# Patient Record
Sex: FEMALE | Race: WHITE | NOT HISPANIC OR LATINO | Employment: OTHER | URBAN - NONMETROPOLITAN AREA
[De-identification: names, ages, dates, MRNs, and addresses within clinical notes are randomized per-mention and may not be internally consistent; named-entity substitution may affect disease eponyms.]

---

## 2020-04-30 ENCOUNTER — OFFICE VISIT CONVERTED (OUTPATIENT)
Dept: FAMILY MEDICINE CLINIC | Age: 75
End: 2020-04-30
Attending: FAMILY MEDICINE

## 2020-06-22 ENCOUNTER — HOSPITAL ENCOUNTER (OUTPATIENT)
Dept: OTHER | Facility: HOSPITAL | Age: 75
Discharge: HOME | End: 2020-06-22
Attending: FAMILY MEDICINE

## 2020-08-27 ENCOUNTER — OFFICE VISIT CONVERTED (OUTPATIENT)
Dept: FAMILY MEDICINE CLINIC | Age: 75
End: 2020-08-27
Attending: FAMILY MEDICINE

## 2020-10-23 ENCOUNTER — HOSPITAL ENCOUNTER (OUTPATIENT)
Dept: OTHER | Facility: HOSPITAL | Age: 75
Discharge: HOME | End: 2020-10-23
Attending: FAMILY MEDICINE

## 2020-10-23 LAB
ALBUMIN SERPL-MCNC: 4.4 G/DL (ref 3.5–5)
ALBUMIN/GLOB SERPL: 1.5 {RATIO} (ref 1.4–2.6)
ALP SERPL-CCNC: 72 U/L (ref 43–160)
ALT SERPL-CCNC: 13 U/L (ref 10–40)
ANION GAP SERPL CALC-SCNC: 17 MMOL/L (ref 8–19)
AST SERPL-CCNC: 20 U/L (ref 15–50)
BASOPHILS # BLD AUTO: 0.05 10*3/UL (ref 0–0.2)
BASOPHILS NFR BLD AUTO: 0.7 % (ref 0–3)
BILIRUB SERPL-MCNC: 0.48 MG/DL (ref 0.2–1.3)
BUN SERPL-MCNC: 20 MG/DL (ref 5–25)
BUN/CREAT SERPL: 18 {RATIO} (ref 6–20)
CALCIUM SERPL-MCNC: 10.4 MG/DL (ref 8.7–10.4)
CHLORIDE SERPL-SCNC: 101 MMOL/L (ref 99–111)
CHOLEST SERPL-MCNC: 122 MG/DL (ref 107–200)
CHOLEST/HDLC SERPL: 2.6 {RATIO} (ref 3–6)
CONV ABS IMM GRAN: 0.05 10*3/UL (ref 0–0.2)
CONV CO2: 27 MMOL/L (ref 22–32)
CONV IMMATURE GRAN: 0.7 % (ref 0–1.8)
CONV TOTAL PROTEIN: 7.3 G/DL (ref 6.3–8.2)
CREAT UR-MCNC: 1.09 MG/DL (ref 0.5–0.9)
DEPRECATED RDW RBC AUTO: 42.9 FL (ref 36.4–46.3)
EOSINOPHIL # BLD AUTO: 0.13 10*3/UL (ref 0–0.7)
EOSINOPHIL # BLD AUTO: 1.7 % (ref 0–7)
ERYTHROCYTE [DISTWIDTH] IN BLOOD BY AUTOMATED COUNT: 12.9 % (ref 11.7–14.4)
EST. AVERAGE GLUCOSE BLD GHB EST-MCNC: 134 MG/DL
GFR SERPLBLD BASED ON 1.73 SQ M-ARVRAT: 50 ML/MIN/{1.73_M2}
GLOBULIN UR ELPH-MCNC: 2.9 G/DL (ref 2–3.5)
GLUCOSE SERPL-MCNC: 118 MG/DL (ref 65–99)
HBA1C MFR BLD: 6.3 % (ref 3.5–5.7)
HCT VFR BLD AUTO: 44 % (ref 37–47)
HDLC SERPL-MCNC: 47 MG/DL (ref 40–60)
HGB BLD-MCNC: 13.5 G/DL (ref 12–16)
LDLC SERPL CALC-MCNC: 42 MG/DL (ref 70–100)
LYMPHOCYTES # BLD AUTO: 1.63 10*3/UL (ref 1–5)
LYMPHOCYTES NFR BLD AUTO: 21.3 % (ref 20–45)
MCH RBC QN AUTO: 28 PG (ref 27–31)
MCHC RBC AUTO-ENTMCNC: 30.7 G/DL (ref 33–37)
MCV RBC AUTO: 91.1 FL (ref 81–99)
MONOCYTES # BLD AUTO: 0.63 10*3/UL (ref 0.2–1.2)
MONOCYTES NFR BLD AUTO: 8.2 % (ref 3–10)
NEUTROPHILS # BLD AUTO: 5.18 10*3/UL (ref 2–8)
NEUTROPHILS NFR BLD AUTO: 67.4 % (ref 30–85)
NRBC CBCN: 0 % (ref 0–0.7)
OSMOLALITY SERPL CALC.SUM OF ELEC: 294 MOSM/KG (ref 273–304)
PLATELET # BLD AUTO: 286 10*3/UL (ref 130–400)
PMV BLD AUTO: 10.1 FL (ref 9.4–12.3)
POTASSIUM SERPL-SCNC: 4.6 MMOL/L (ref 3.5–5.3)
RBC # BLD AUTO: 4.83 10*6/UL (ref 4.2–5.4)
SODIUM SERPL-SCNC: 140 MMOL/L (ref 135–147)
TRIGL SERPL-MCNC: 164 MG/DL (ref 40–150)
TSH SERPL-ACNC: 3.4 M[IU]/L (ref 0.27–4.2)
VLDLC SERPL-MCNC: 33 MG/DL (ref 5–37)
WBC # BLD AUTO: 7.67 10*3/UL (ref 4.8–10.8)

## 2021-01-27 ENCOUNTER — HOSPITAL ENCOUNTER (OUTPATIENT)
Dept: OTHER | Facility: HOSPITAL | Age: 76
Discharge: HOME | End: 2021-01-27
Attending: FAMILY MEDICINE

## 2021-01-27 LAB
APPEARANCE UR: ABNORMAL
BACTERIA UR CULT: NORMAL
BACTERIA UR QL AUTO: ABNORMAL
BILIRUB UR QL: NEGATIVE
CASTS URNS QL MICRO: ABNORMAL /[LPF]
COLOR UR: YELLOW
CONV LEUKOCYTE ESTERASE: ABNORMAL
CONV UROBILINOGEN IN URINE BY AUTOMATED TEST STRIP: 0.2 {EHRLICHU}/DL (ref 0.1–1)
EPI CELLS #/AREA URNS HPF: ABNORMAL /[HPF]
GLUCOSE 24H UR-MCNC: NEGATIVE MG/DL
HGB UR QL STRIP: ABNORMAL
KETONES UR QL STRIP: NEGATIVE MG/DL
MUCOUS THREADS URNS QL MICRO: ABNORMAL
NITRITE UR-MCNC: POSITIVE MG/ML
PH UR STRIP.AUTO: 5.5 [PH] (ref 5–8)
PROT UR-MCNC: NEGATIVE MG/DL
RBC # BLD AUTO: ABNORMAL /[HPF]
SP GR UR STRIP: >=1.03 (ref 1–1.03)
SPECIMEN SOURCE: ABNORMAL
UNIDENT CRYS URNS QL MICRO: ABNORMAL /[HPF]
WBC #/AREA URNS HPF: ABNORMAL /[HPF]

## 2021-01-29 LAB
AMPICILLIN SUSC ISLT: >=32
AMPICILLIN+SULBAC SUSC ISLT: >=32
BACTERIA UR CULT: ABNORMAL
CEFAZOLIN SUSC ISLT: <=4
CEFEPIME SUSC ISLT: <=0.12
CEFTAZIDIME SUSC ISLT: <=1
CEFTRIAXONE SUSC ISLT: <=0.25
CIPROFLOXACIN SUSC ISLT: <=0.25
ERTAPENEM SUSC ISLT: <=0.12
GENTAMICIN SUSC ISLT: <=1
LEVOFLOXACIN SUSC ISLT: <=0.12
NITROFURANTOIN SUSC ISLT: <=16
PIP+TAZO SUSC ISLT: <=4
TMP SMX SUSC ISLT: >=320
TOBRAMYCIN SUSC ISLT: <=1

## 2021-02-02 ENCOUNTER — HOSPITAL ENCOUNTER (OUTPATIENT)
Dept: OTHER | Facility: HOSPITAL | Age: 76
Discharge: HOME | End: 2021-02-02
Attending: FAMILY MEDICINE

## 2021-02-02 LAB
APPEARANCE UR: CLEAR
BACTERIA UR QL AUTO: ABNORMAL
BILIRUB UR QL: NEGATIVE
CASTS URNS QL MICRO: ABNORMAL /[LPF]
COLOR UR: ABNORMAL
CONV LEUKOCYTE ESTERASE: ABNORMAL
CONV UROBILINOGEN IN URINE BY AUTOMATED TEST STRIP: 0.2 {EHRLICHU}/DL (ref 0.1–1)
EPI CELLS #/AREA URNS HPF: ABNORMAL /[HPF]
GLUCOSE 24H UR-MCNC: NEGATIVE MG/DL
HGB UR QL STRIP: ABNORMAL
KETONES UR QL STRIP: NEGATIVE MG/DL
MUCOUS THREADS URNS QL MICRO: ABNORMAL
NITRITE UR-MCNC: NEGATIVE MG/ML
PH UR STRIP.AUTO: 5.5 [PH] (ref 5–8)
PROT UR-MCNC: NEGATIVE MG/DL
RBC # BLD AUTO: ABNORMAL /[HPF]
SP GR UR STRIP: 1.02 (ref 1–1.03)
SPECIMEN SOURCE: ABNORMAL
UNIDENT CRYS URNS QL MICRO: ABNORMAL /[HPF]
WBC #/AREA URNS HPF: ABNORMAL /[HPF]

## 2021-02-05 LAB
BACTERIA UR CULT: ABNORMAL
CEFEPIME SUSC ISLT: 2
CEFTAZIDIME SUSC ISLT: 2
CIPROFLOXACIN SUSC ISLT: <=0.25
GENTAMICIN SUSC ISLT: <=1
LEVOFLOXACIN SUSC ISLT: 0.5
PIP+TAZO SUSC ISLT: <=4
TOBRAMYCIN SUSC ISLT: <=1

## 2021-02-25 ENCOUNTER — HOSPITAL ENCOUNTER (OUTPATIENT)
Dept: OTHER | Facility: HOSPITAL | Age: 76
Discharge: HOME | End: 2021-02-25
Attending: FAMILY MEDICINE

## 2021-02-25 LAB
APPEARANCE UR: CLEAR
BACTERIA UR QL AUTO: ABNORMAL
BILIRUB UR QL: ABNORMAL
CASTS URNS QL MICRO: ABNORMAL /[LPF]
COLOR UR: YELLOW
CONV LEUKOCYTE ESTERASE: ABNORMAL
CONV UROBILINOGEN IN URINE BY AUTOMATED TEST STRIP: 1 {EHRLICHU}/DL (ref 0.1–1)
EPI CELLS #/AREA URNS HPF: ABNORMAL /[HPF]
GLUCOSE 24H UR-MCNC: NEGATIVE MG/DL
HGB UR QL STRIP: ABNORMAL
KETONES UR QL STRIP: ABNORMAL MG/DL
MUCOUS THREADS URNS QL MICRO: ABNORMAL
NITRITE UR-MCNC: NEGATIVE MG/ML
PH UR STRIP.AUTO: 5.5 [PH] (ref 5–8)
PROT UR-MCNC: ABNORMAL MG/DL
RBC # BLD AUTO: ABNORMAL /[HPF]
SP GR UR STRIP: 1.02 (ref 1–1.03)
SPECIMEN SOURCE: ABNORMAL
UNIDENT CRYS URNS QL MICRO: ABNORMAL /[HPF]
WBC #/AREA URNS HPF: ABNORMAL /[HPF]

## 2021-04-15 ENCOUNTER — OFFICE VISIT CONVERTED (OUTPATIENT)
Dept: FAMILY MEDICINE CLINIC | Age: 76
End: 2021-04-15
Attending: FAMILY MEDICINE

## 2021-04-16 ENCOUNTER — HOSPITAL ENCOUNTER (OUTPATIENT)
Dept: OTHER | Facility: HOSPITAL | Age: 76
Discharge: HOME | End: 2021-04-16
Attending: FAMILY MEDICINE

## 2021-04-16 LAB
ALBUMIN SERPL-MCNC: 4.3 G/DL (ref 3.5–5)
ALBUMIN/GLOB SERPL: 1.5 {RATIO} (ref 1.4–2.6)
ALP SERPL-CCNC: 68 U/L (ref 43–160)
ALT SERPL-CCNC: 13 U/L (ref 10–40)
ANION GAP SERPL CALC-SCNC: 15 MMOL/L (ref 8–19)
AST SERPL-CCNC: 23 U/L (ref 15–50)
BILIRUB SERPL-MCNC: 0.37 MG/DL (ref 0.2–1.3)
BUN SERPL-MCNC: 11 MG/DL (ref 5–25)
BUN/CREAT SERPL: 12 {RATIO} (ref 6–20)
CALCIUM SERPL-MCNC: 9.4 MG/DL (ref 8.7–10.4)
CHLORIDE SERPL-SCNC: 103 MMOL/L (ref 99–111)
CHOLEST SERPL-MCNC: 94 MG/DL (ref 107–200)
CHOLEST/HDLC SERPL: 2.1 {RATIO} (ref 3–6)
CONV CO2: 27 MMOL/L (ref 22–32)
CONV TOTAL PROTEIN: 7.2 G/DL (ref 6.3–8.2)
CREAT UR-MCNC: 0.94 MG/DL (ref 0.5–0.9)
ERYTHROCYTE [DISTWIDTH] IN BLOOD BY AUTOMATED COUNT: 13.1 % (ref 11.5–14.5)
EST. AVERAGE GLUCOSE BLD GHB EST-MCNC: 126 MG/DL
GFR SERPLBLD BASED ON 1.73 SQ M-ARVRAT: 59 ML/MIN/{1.73_M2}
GLOBULIN UR ELPH-MCNC: 2.9 G/DL (ref 2–3.5)
GLUCOSE SERPL-MCNC: 108 MG/DL (ref 65–99)
HBA1C MFR BLD: 12.3 G/DL (ref 12–16)
HBA1C MFR BLD: 6 % (ref 3.5–5.7)
HCT VFR BLD AUTO: 39.2 % (ref 37–47)
HDLC SERPL-MCNC: 44 MG/DL (ref 40–60)
LDLC SERPL CALC-MCNC: 21 MG/DL (ref 70–100)
MCH RBC QN AUTO: 28.2 PG (ref 27–31)
MCHC RBC AUTO-ENTMCNC: 31.4 G/DL (ref 33–37)
MCV RBC AUTO: 89.9 FL (ref 81–99)
OSMOLALITY SERPL CALC.SUM OF ELEC: 290 MOSM/KG (ref 273–304)
PLATELET # BLD AUTO: 302 10*3/UL (ref 130–400)
PMV BLD AUTO: 10.2 FL (ref 7.4–10.4)
POTASSIUM SERPL-SCNC: 4.5 MMOL/L (ref 3.5–5.3)
RBC # BLD AUTO: 4.36 10*6/UL (ref 4.2–5.4)
SODIUM SERPL-SCNC: 140 MMOL/L (ref 135–147)
TRIGL SERPL-MCNC: 145 MG/DL (ref 40–150)
TSH SERPL-ACNC: 2.16 M[IU]/L (ref 0.27–4.2)
VLDLC SERPL-MCNC: 29 MG/DL (ref 5–37)
WBC # BLD AUTO: 6.12 10*3/UL (ref 4.8–10.8)

## 2021-05-14 ENCOUNTER — HOSPITAL ENCOUNTER (OUTPATIENT)
Dept: OTHER | Facility: HOSPITAL | Age: 76
Discharge: HOME | End: 2021-05-14
Attending: FAMILY MEDICINE

## 2021-05-14 ENCOUNTER — OFFICE VISIT CONVERTED (OUTPATIENT)
Dept: FAMILY MEDICINE CLINIC | Age: 76
End: 2021-05-14
Attending: FAMILY MEDICINE

## 2021-05-14 LAB
APPEARANCE UR: CLEAR
BACTERIA UR QL AUTO: ABNORMAL
BILIRUB UR QL: NEGATIVE
CASTS URNS QL MICRO: ABNORMAL /[LPF]
COLOR UR: YELLOW
CONV LEUKOCYTE ESTERASE: NEGATIVE
CONV UROBILINOGEN IN URINE BY AUTOMATED TEST STRIP: 0.2 {EHRLICHU}/DL (ref 0.1–1)
EPI CELLS #/AREA URNS HPF: ABNORMAL /[HPF]
FOLATE SERPL-MCNC: 15.5 NG/ML (ref 4.8–20)
GLUCOSE 24H UR-MCNC: NEGATIVE MG/DL
HGB UR QL STRIP: ABNORMAL
KETONES UR QL STRIP: NEGATIVE MG/DL
MUCOUS THREADS URNS QL MICRO: ABNORMAL
NITRITE UR-MCNC: NEGATIVE MG/ML
PH UR STRIP.AUTO: 5.5 [PH] (ref 5–8)
PROT UR-MCNC: NEGATIVE MG/DL
RBC # BLD AUTO: ABNORMAL /[HPF]
SP GR UR STRIP: 1.01 (ref 1–1.03)
SPECIMEN SOURCE: ABNORMAL
UNIDENT CRYS URNS QL MICRO: ABNORMAL /[HPF]
VIT B12 SERPL-MCNC: 495 PG/ML (ref 211–911)
WBC #/AREA URNS HPF: ABNORMAL /[HPF]

## 2021-05-15 LAB
25(OH)D3 SERPL-MCNC: 79.7 NG/ML (ref 30–100)
IRON SATN MFR SERPL: 11 % (ref 20–55)
IRON SERPL-MCNC: 59 UG/DL (ref 60–170)
TIBC SERPL-MCNC: 532 UG/DL (ref 245–450)
TRANSFERRIN SERPL-MCNC: 372 MG/DL (ref 250–380)

## 2021-05-18 NOTE — PROGRESS NOTES
Dacia Napier  1945     Office/Outpatient Visit    Visit Date: Fri, May 14, 2021 01:08 pm    Provider: Mata Love MD (Assistant: Teena Perdue MA)    Location: [facility]        Electronically signed by Mata Love MD on  05/16/2021 07:10:01 PM                             Subjective:        CC: Ms. Napier is a 75 year old female.  This is a follow-up visit.          HPI:           Patient to be evaluated for type 2 diabetes mellitus without complications.  Specifically, this is type 2, non-insulin requiring diabetes.  Compliance with treatment has been good.  Current meds include an oral hypoglycemic.  Most recent lab results include Hemoglobin A1c:  6.3 (%) (10/23/2020).            With regard to the hyperlipidemia, unspecified, current treatment includes a lipid lowering agent.  Compliance with treatment has been good.  Most recent lab tests include Total Cholesterol:  94 (mg/dL) (04/16/2021), HDL:  44 (mg/dL) (04/16/2021), Triglycerides:  145 (mg/dL) (04/16/2021), LDL:  21 (mg/dL) (04/16/2021).            Dx with gastro-esophageal reflux disease without esophagitis; the location of the discomfort is primarily epigastric.  She describes the pain as burning.  Symptoms are improved with a proton-pump inhibitor.  Past medical history is pertinent for hiatal hernia.      ROS:     CONSTITUTIONAL:  Negative for chills and fever.      E/N/T:  Negative for ear pain, nasal congestion and sore throat.      CARDIOVASCULAR:  Negative for chest pain and palpitations.      RESPIRATORY:  Negative for recent cough and dyspnea.      GASTROINTESTINAL:  Positive for abdominal pain ( suprapubic; INTERMITTENT, CRAMPY ).   Negative for nausea or vomiting.      GENITOURINARY:  Positive for dysuria.   Negative for hematuria.      MUSCULOSKELETAL:  Negative for arthralgias and myalgias.      INTEGUMENTARY/BREAST:  Negative for breast mass and breast tenderness.      NEUROLOGICAL:  Negative  for headaches and paresthesias.          Past Medical History / Family History / Social History:         Last Reviewed on 5/14/2021 01:45 PM by Mata Love    Past Medical History:             PAST MEDICAL HISTORY             CURRENT MEDICAL PROVIDERS:    Cardiologist    Gastroenterologist         PREVENTIVE HEALTH MAINTENANCE             COLORECTAL CANCER SCREENING: Up to date (colonoscopy q10y; sigmoidoscopy q5y; Cologuard q3y) was last done 2018; colonoscopy with the following abnormalities noted-- Polyp(s); REPORT NOT IN CHART     MAMMOGRAM: Done within last 2 years and results in are chart was last done 7/6/20 with normal results     PAP SMEAR: No longer indicated due to age and history     Prolia Injection : last injection 11/4/20         Surgical History:         Positive for    Cataract Removal: bilateral;,    Hernia Repair: umbilical;,    Hysterectomy: COMPLETE BUT OVARIES ARE INTACT;;; and    Tonsillectomy/Adenoidectomy;         Family History:         Positive for Coronary Artery Disease and Myocardial Infarction;     Positive for Breast Cancer;     Positive for Cerebrovascular Accident;         Social History:     Occupation:    Retired     Marital Status:      Children: 2 children         Tobacco/Alcohol/Supplements:     Last Reviewed on 5/14/2021 01:45 PM by Mata Love    Tobacco: She has never smoked.          Substance Abuse History:     Last Reviewed on 5/14/2021 01:45 PM by Mata Love    None         Mental Health History:     Last Reviewed on 8/27/2020 06:18 PM by Anthony Feng    NEGATIVE         Communicable Diseases (eg STDs):     Last Reviewed on 8/27/2020 06:18 PM by Anthony Feng        Current Problems:     Last Reviewed on 5/14/2021 01:45 PM by Mata Love    Type 2 diabetes mellitus without complications    Hyperlipidemia, unspecified    Cardiac arrhythmia, unspecified    Encounter for screening mammogram for malignant neoplasm of  breast    Age-related osteoporosis without current pathological fracture    Hiatus hernia    Renal agenesis, 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        Current Medications:     Last Reviewed on 5/14/2021 01:45 PM by Mata Love    calcium 500 mg   [bid]    aspirin 81 mg oral tablet, delayed release (enteric coated) [take 1 tablet (81 mg) by oral route once daily]    Fish Oil  [bid]    vitamin d3  [once a day]    coq10  [once daily]    vitamin b  [one daily]    multivitamin  [one daily]    omeprazole 10 mg oral capsule,delayed release (enteric coated) [take 1 daily]    ezetimibe 10 mg oral tablet [TAKE 1 TABLET(10 MG) BY MOUTH EVERY DAY]    metFORMIN 500 mg oral tablet [TAKE 1 TABLET BY MOUTH WITH MORNING MEAL AND 1 TABLET BY MOUTH WITH EVENING MEAL]    rosuvastatin 10 mg oral tablet [TAKE 1 TABLET(10 MG) BY MOUTH EVERY DAY]    Prolia 60 mg/mL subcutaneous Syringe [inject 1 milliliter (60 mg) by subcutaneous route every 6 months in the upper arm, upper thigh or abdomen]        Objective:        Vitals:         Current: 5/14/2021 1:13:33 PM    Ht:  5 ft, 2 in;  Wt: 131.6 lbs;  BMI: 24.1T: 97.3 F (temporal);  BP: 108/66 mm Hg (left arm, sitting);  P: 95 bpm (left arm (BP Cuff), sitting);  sCr: 0.94 mg/dL;  GFR: 47.82        Exams:     PHYSICAL EXAM:     GENERAL: vital signs recorded - well developed, well nourished;  no apparent distress;     NECK: thyroid is non-palpable;     RESPIRATORY: normal respiratory rate and pattern with no distress; normal breath sounds  with no rales, rhonchi, wheezes or rubs;     CARDIOVASCULAR: normal rate; rhythm is regular;  no systolic murmur; no edema;     GASTROINTESTINAL: nontender;     LYMPHATIC: no enlargement of cervical or facial nodes; no supraclavicular nodes;     MUSCULOSKELETAL: No CVA tenderness     NEUROLOGIC: GROSSLY INTACT     PSYCHIATRIC:  appropriate affect and demeanor; normal speech pattern; grossly normal memory;         Assessment:         E11.9   Type 2 diabetes mellitus without complications       E78.5   Hyperlipidemia, unspecified       K21.9   Gastro-esophageal reflux disease without esophagitis       I49.9   Cardiac arrhythmia, unspecified       R30.0   Dysuria       E55.9   Vitamin D deficiency, unspecified       E53.9   Vitamin B deficiency, unspecified           ORDERS:         Lab Orders:       31565  FOL - [facility] Folate; folic acid serum  (Send-Out)            19549  IRONP - [facility] Iron and TIBC  (Send-Out)            77156  VB12 - [facility] Vitamin B12  (Send-Out)            32394  BDUAM - [facility] Urinalysis, automated, with micro  (Send-Out)            37859  VITD - [facility] Vitamin D, 25 Hydroxy  (Send-Out)              Procedures Ordered:       REFER  Referral to Specialist or Other Facility  (Send-Out)                      Plan:         Type 2 diabetes mellitus without complications        MEDICATIONS: (no change to current medication regimen)     FOLLOW-UP: Schedule a follow-up visit in 3 months.      GOOD CONTROL ON CURRENT MEDS         Hyperlipidemia, unspecified        GOOD CONTROL AT THIS TIME         Gastro-esophageal reflux disease without esophagitis        MEDICATIONS: (no change to current medication regimen)     REFERRALS:  Referral initiated to a gastroenterologist.      GOOD CONTROL OF GERD SYMPTOMS BUT WILL REFER TO GASTRO FOR THE ABDOMINAL CRAMPING.          Cardiac arrhythmia, unspecified        REFERRALS:  Referral initiated to a cardiologist.            Orders:       REFER  Referral to Specialist or Other  Facility  (Send-Out)              Dysuria        WILL CHECK A U/A BUT MAY BE ATROPHIC VAGINITIS         Vitamin D deficiency, unspecified        LABS AS ABOVE         Vitamin B deficiency, unspecified    LABORATORY:  Labs ordered to be performed today include Anemia profile Folate Serum iron Vitamin B12, urinalysis with micro, and Vitamin D.            Orders:       86053  FOL - [facility] Folate; folic acid serum  (Send-Out)            73010  IRONP - [facility] Iron and TIBC  (Send-Out)            57346  VB12 - [facility] Vitamin B12  (Send-Out)            19977  BDUAM - [facility] Urinalysis, automated, with micro  (Send-Out)            83165  VITD - [facility] Vitamin D, 25 Hydroxy  (Send-Out)                  Patient Recommendations:        For  Type 2 diabetes mellitus without complications:    Schedule a follow-up visit in 3 months.          For  Dysuria:    I also recommend WILL CHECK A U/A BUT MAY BE ATROPHIC VAGINITIS.          For  Vitamin D deficiency, unspecified:    I also recommend LABS AS ABOVE.              Charge Capture:         Primary Diagnosis:     E11.9  Type 2 diabetes mellitus without complications           Orders:      43578  Office/outpatient visit; established patient, level 4  (In-House)              E78.5  Hyperlipidemia, unspecified     K21.9  Gastro-esophageal reflux disease without esophagitis     I49.9  Cardiac arrhythmia, unspecified     R30.0  Dysuria     E55.9  Vitamin D deficiency, unspecified     E53.9  Vitamin B deficiency, unspecified         ADDENDUMS:      ____________________________________    Addendum: 06/01/2021 03:13 PM - Two, Team         Visit Note Faxed to:        [facility]; Number [phone]

## 2021-05-18 NOTE — PROGRESS NOTES
Dacia Napier  1945     Office/Outpatient Visit    Visit Date: Thu, Aug 27, 2020 01:59 pm    Provider: Anthony Feng MD (Assistant: Veronica Julien MA)    Location: [facility]        Electronically signed by Anthony Feng MD on  08/27/2020 06:18:45 PM                             Subjective:        CC: Ms. Napier is a 74 year old female.  This is a follow-up visit.  [phone] [facility] Today's encounter is being done with a telehealth visit. She has consented verbally with two witnesses for todays treatment. Todays visit is being conducted by audio and video. Individuals present during the telemedicine consultation include patient and Dr. Feng         HPI:           Patient to be evaluated for hyperlipidemia, unspecified.  Current treatment includes Crestor.  Compliance with treatment has been good; she takes her medication as directed and follows up as directed.  She denies experiencing any hypercholesterolemia related symptoms.  She does not know results of any recent lab monitoring.        Dacia Muller has a history of intermittent episodes of tachycardia.  These episodes occur randomly and will be associated with heart rates as high as 130.  His episodes last for minutes to up to an hour and usually go away with vagal maneuvers or carotid massage.  Prior to moving here, she saw cardiologist twice a year and they have been discussing cardiac ablation but Dacia Muller was not sure about this.  She says she will eventually like to see a cardiologist here in [place] but would like to wait until the end of the pandemic.  She denies chest pain, shortness of breath or edema.      Finally, Dacia Muller is a history of prediabetes.  She takes metformin 500 mg twice daily and attempts to monitor her diet.  She denies fatigue, polyuria, polyphagia or weight loss.    ROS:     CONSTITUTIONAL:  Negative for chills, fatigue, fever, and weight change.      EYES:  Negative for blurred vision.       CARDIOVASCULAR:  Negative for chest pain, dizziness, palpitations and edema.      RESPIRATORY:  Negative for dyspnea and cough.      GASTROINTESTINAL:  Negative for abdominal pain, diarrhea, nausea and vomiting.      NEUROLOGICAL:  Negative for headaches, paresthesias and weakness.      ENDOCRINE:  Negative for hair loss, heat/cold intolerance, polydipsia, and polyphagia.      PSYCHIATRIC:  Negative for anxiety, depression, and sleep disturbances.          Past Medical History / Family History / Social History:         Last Reviewed on 8/27/2020 06:18 PM by Anthony Feng    Past Medical History:             PAST MEDICAL HISTORY         Positive for    Arrhythmia (unknown type) and    Hyperlipidemia;         PREVENTIVE HEALTH MAINTENANCE             COLORECTAL CANCER SCREENING: colonoscopy with normal results; The next colonoscopy is due  2018     MAMMOGRAM: Done within last 2 years and results in are chart was last done 7/6/20 with normal results The next one is due  7/6/21-22     PAP SMEAR: No longer indicated due to age and history         Surgical History:         Positive for    Cataract Removal,    Hernia Repair,    Hysterectomy and    Tonsillectomy/Adenoidectomy;         Family History:         Positive for Coronary Artery Disease and Myocardial Infarction;     Positive for Breast Cancer;     Positive for Cerebrovascular Accident;         Social History:     Occupation:    Retired     Marital Status:      Children: 2 children         Tobacco/Alcohol/Supplements:     Last Reviewed on 8/27/2020 06:18 PM by Anthony Feng    Tobacco: She has never smoked.          Substance Abuse History:     Last Reviewed on 8/27/2020 06:18 PM by Anthony Feng    None         Mental Health History:     Last Reviewed on 8/27/2020 06:18 PM by Anthony Fegn    NEGATIVE         Communicable Diseases (eg STDs):     Last Reviewed on 8/27/2020 06:18 PM by Anthony Feng        Current Problems:     Last Reviewed on 8/27/2020 06:18  PM by Anthony Feng    Hyperlipidemia, unspecified    Cardiac arrhythmia, unspecified    Prediabetes    Encounter for screening mammogram for malignant neoplasm of breast        Immunizations:     None        Allergies:     Last Reviewed on 8/27/2020 06:18 PM by Anthony Feng    No Known Allergies.        Current Medications:     Last Reviewed on 8/27/2020 06:18 PM by Anthony Feng    calcium 500 mg   [bid]    aspirin 81 mg oral tablet, delayed release (enteric coated) [take 1 tablet (81 mg) by oral route once daily]    Fish Oil  [bid]    vitamin d3  [once a day]    coq10  [once daily]    vitamin b  [one daily]    multivitamin  [one daily]    Crestor 5 mg oral tablet [take 1 tablet (5 mg) by oral route once daily]    metFORMIN 500 mg oral tablet [take 1 tablet (500 mg) by oral route 2 times per day with morning andevening meals]    omeprazole 10 mg oral capsule,delayed release (enteric coated) [take 1 daily]        Objective:        Exams:     PHYSICAL EXAM:     GENERAL: vital signs recorded - well developed, well nourished;  no apparent distress;     EYES: conjunctiva and cornea are normal;     RESPIRATORY: normal respiratory rate and pattern with no distress;     BREAST/INTEGUMENT: No significant rashes, lesions or suspicious moles within limits of examination/encounter;     NEUROLOGIC: Grossly intact; mental status: alert and oriented x 3;     PSYCHIATRIC: appropriate affect and demeanor; normal speech pattern; Normal behavior;         Assessment:         E78.5   Hyperlipidemia, unspecified       I49.9   Cardiac arrhythmia, unspecified       R73.03   Prediabetes           ORDERS:         Lab Orders:       [id number]  [facility] Lipid Panel  (Send-Out)            83479  BDC [facility] CBC with 3 part diff  (Send-Out)            06705  TSH - [facility] TSH  (Send-Out)            17155  COMP [facility] Comp. Metabolic Panel  (Send-Out)            52841  A1CEG - [facility] Hemoglobin A1C  (Send-Out)                      Plan:          Hyperlipidemia, unspecified- Unknown control.  Continue Crestor 5 mg daily.  Lipid panel ordered.    LABORATORY:  Labs ordered to be performed today include lipid panel.            Orders:       24475  [facility]  [facility])              Cardiac arrhythmia, unspecified- Stable.  Continue to monitor.  Will consider cardiology referral in the near future.    LABORATORY:  Labs ordered to be performed today include CBC and TSH.            Orders:       [id number]  BDCBC [facility] CBC with 3 part diff  (Send-Out)            71215  TSH - [facility] TSH  (Send-Out)              Prediabetes- Continue metformin 500 mg twice daily.  Labs ordered including CMP and A1c.    LABORATORY:  Labs ordered to be performed today include Comprehensive metabolic panel and HgbA1C.            Orders:       21213  COMP - [facility] Comp. Metabolic Panel  (Send-Out)            01293  A1CEG - [facility] Hemoglobin A1C  (Send-Out)                  Charge Capture:         Primary Diagnosis:     E78.5  Hyperlipidemia, unspecified           Orders:      03398  Office/outpatient visit; established patient, level 4  (In-House)              I49.9  Cardiac arrhythmia, unspecified     R73.03  Prediabetes

## 2021-05-18 NOTE — PROGRESS NOTES
"Dacia Napier  1945     Office/Outpatient Visit    Visit Date: Thu, Apr 15, 2021 01:02 pm    Provider: Mata Love MD (Assistant: Nohemi Mcdonald MA)    Location: [facility]        Electronically signed by Mata Love MD on  04/28/2021 09:19:48 PM                             Subjective:        CC: POLYP REF GASTRO    CRMPY LOW ABD PAIN 2-3/WK, AT HS, ->GASTRO AS WELL    ?RHYTHYM PROBLEM STICKY VALVE, GET RECORDS AND SHE WILL ARRANGE CARD     O P BP PROLIA SCHED DEX MAMM    ADD NEPHROLOGY    GASTRO    DEXA/MAMM (WAS IN JULY)    LABS    RECORDSMs. Quyen is a 75 year old female.  PT is present in the office today for a basic check up, she is complaining of stomach issues lasting 2-3 hours off and on for the past 6 months. PT had colon last check 3 years ago;         HPI:           Patient presents with prediabetes.  Specifically, this is type 2, non-insulin requiring diabetes.  Compliance with treatment has been good.  Current meds include an oral hypoglycemic.  Most recent lab results include Hemoglobin A1c:  6.3 (%) (10/23/2020).            Complaint of cardiac arrhythmia, unspecified..  The symptom began years ago.  WAS TOLD SHE HAS A \"SICKY VALVE\"  NEEDS TO SEE A CARDIOLOGIST AGAIN           Hyperlipidemia, unspecified details; current treatment includes a lipid lowering agent.  Compliance with treatment has been good.  Most recent lab tests include Total Cholesterol:  122 (mg/dL) (10/23/2020), HDL:  47 (mg/dL) (10/23/2020), Triglycerides:  164 (mg/dL) (10/23/2020), LDL:  42 (mg/dL) (10/23/2020).            Gastro-esophageal reflux disease without esophagitis details; the location of the discomfort is primarily epigastric.  She describes the pain as burning.  Symptoms are improved with a proton-pump inhibitor.  Past medical history is pertinent for hiatal hernia.      ROS:     CONSTITUTIONAL:  Negative for chills and fever.      E/N/T:  Negative for ear pain, nasal " congestion and sore throat.      CARDIOVASCULAR:  Negative for chest pain and palpitations.      RESPIRATORY:  Negative for recent cough and dyspnea.      GASTROINTESTINAL:  Positive for abdominal pain ( suprapubic; INTERMITTENT, CRAMPY ).   Negative for nausea or vomiting.      GENITOURINARY:  Negative for dysuria and hematuria.      MUSCULOSKELETAL:  Negative for arthralgias and myalgias.      INTEGUMENTARY/BREAST:  Negative for breast mass and breast tenderness.      NEUROLOGICAL:  Negative for headaches and paresthesias.          Past Medical History / Family History / Social History:         Last Reviewed on 4/15/2021 01:18 PM by Mata Love    Past Medical History:             PAST MEDICAL HISTORY             CURRENT MEDICAL PROVIDERS:    Cardiologist    Gastroenterologist         PREVENTIVE HEALTH MAINTENANCE             COLORECTAL CANCER SCREENING: Up to date (colonoscopy q10y; sigmoidoscopy q5y; Cologuard q3y) was last done 2018; colonoscopy with the following abnormalities noted-- Polyp(s); REPORT NOT IN CHART     MAMMOGRAM: Done within last 2 years and results in are chart was last done 7/6/20 with normal results     PAP SMEAR: No longer indicated due to age and history     Prolia Injection : last injection 11/4/20         Surgical History:         Positive for    Cataract Removal: bilateral;,    Hernia Repair: umbilical;,    Hysterectomy: COMPLETE BUT OVARIES ARE INTACT;;; and    Tonsillectomy/Adenoidectomy;         Family History:         Positive for Coronary Artery Disease and Myocardial Infarction;     Positive for Breast Cancer;     Positive for Cerebrovascular Accident;         Social History:     Occupation:    Retired     Marital Status:      Children: 2 children         Tobacco/Alcohol/Supplements:     Last Reviewed on 4/15/2021 01:18 PM by Mata Love    Tobacco: She has never smoked.          Substance Abuse History:     Last Reviewed on 4/15/2021 01:18 PM by  Mata Love    None         Mental Health History:     Last Reviewed on 8/27/2020 06:18 PM by Anthony Feng    NEGATIVE         Communicable Diseases (eg STDs):     Last Reviewed on 8/27/2020 06:18 PM by Anthony Feng        Current Problems:     Last Reviewed on 4/15/2021 01:17 PM by Mata Love    Hyperlipidemia, unspecified    Cardiac arrhythmia, unspecified    Prediabetes    Encounter for screening mammogram for malignant neoplasm of breast    Age-related osteoporosis without current pathological fracture    Hiatus hernia    Renal agenesis, unilateral    Personal history of colonic polyps    History of recurrent urinary tract infections    Gastro-esophageal reflux disease without esophagitis        Immunizations:     Influenza, high dose seasonal 4/15/2021    SARS-COV-2 (COVID-19) vaccine, UNSPECIFIED 3/24/2021        Allergies:     Last Reviewed on 4/15/2021 01:18 PM by Mata Love    No Known Allergies.        Current Medications:     Last Reviewed on 4/15/2021 01:18 PM by Mata Love    calcium 500 mg   [bid]    aspirin 81 mg oral tablet, delayed release (enteric coated) [take 1 tablet (81 mg) by oral route once daily]    Fish Oil  [bid]    vitamin d3  [once a day]    coq10  [once daily]    vitamin b  [one daily]    multivitamin  [one daily]    omeprazole 10 mg oral capsule,delayed release (enteric coated) [take 1 daily]    ezetimibe 10 mg oral tablet [TAKE 1 TABLET(10 MG) BY MOUTH EVERY DAY]    metFORMIN 500 mg oral tablet [TAKE 1 TABLET BY MOUTH WITH MORNING MEAL AND 1 TABLET BY MOUTH WITH EVENING MEAL]    rosuvastatin 10 mg oral tablet [TAKE 1 TABLET(10 MG) BY MOUTH EVERY DAY]    Prolia 60 mg/mL subcutaneous Syringe [inject 1 milliliter (60 mg) by subcutaneous route every 6 months in the upper arm, upper thigh or abdomen]        Objective:        Vitals:         Current: 4/15/2021 1:13:59 PM    Wt: 130.4 lbsT: 96.8 F (temporal);  BP: 112/74 mm Hg (left arm,  sitting);  P: 85 bpm (left arm (BP Cuff), sitting);  sCr: 1.09 mg/dL;  GFR: 44.59        Exams:     PHYSICAL EXAM:     GENERAL: vital signs recorded - well developed, well nourished;  no apparent distress;     NECK: thyroid is non-palpable;     RESPIRATORY: normal respiratory rate and pattern with no distress; normal breath sounds with no rales, rhonchi, wheezes or rubs;     CARDIOVASCULAR: normal rate; rhythm is regular;  no systolic murmur; no edema;     GASTROINTESTINAL: nontender;     LYMPHATIC: no enlargement of cervical or facial nodes; no supraclavicular nodes;     MUSCULOSKELETAL: No CVA tenderness     NEUROLOGIC: GROSSLY INTACT     PSYCHIATRIC:  appropriate affect and demeanor; normal speech pattern; grossly normal memory;         Assessment:         E11.9   Type 2 diabetes mellitus without complications       I49.9   Cardiac arrhythmia, unspecified       E78.5   Hyperlipidemia, unspecified       K21.9   Gastro-esophageal reflux disease without esophagitis           ORDERS:         Lab Orders:       [id number]  DIAB1 [facility] LIPID,CMP, A1C: 01976, 99987, 24597  (Send-Out)            91869  BDCB2 - [facility] CBC w/o diff  (Send-Out)            90331  TSH - [facility] TSH  (Send-Out)              Procedures Ordered:       REFER  Referral to Specialist or Other Facility  (Send-Out)            REFER  Referral to Specialist or Other Facility  (Send-Out)                      Plan:         Type 2 diabetes mellitus without complications    LABORATORY:  Labs ordered to be performed today include Diabetes Panel 1; CMP, Lipid, A1C.      MEDICATIONS: (no change to current medication regimen)     FOLLOW-UP: Schedule a follow-up visit in 1 month.      GOOD CONTROL ON CURRENT MEDS           Orders:       [id number]  DIAB1 - [facility] LIPID,CMP, A1C: 54228, 01675, 35860  (Send-Out)              Cardiac arrhythmia, unspecified    LABORATORY:  Labs ordered to be performed today include CBC W/O DIFF and TSH.      REFERRALS:  Referral initiated to a  cardiologist.  WILL AT LEAST NEED AN ECHO           Orders:       [id number]  BDCB2 - [facility] CBC w/o diff  (Send-Out)            82565  TSH - [facility] TSH  (Send-Out)            REFER  Referral to Specialist or Other Facility  (Send-Out)              Hyperlipidemia, unspecified        LABS AS ABOVE         Gastro-esophageal reflux disease without esophagitis        REFERRALS:  Referral initiated to a gastroenterologist ( ALSO FOR HISTORY OF POLYPS AND CRAMPY PAINS ).            Orders:       REFER  Referral to Specialist or Other Facility  (Send-Out)                  Patient Recommendations:        For  Type 2 diabetes mellitus without complications:    Schedule a follow-up visit in 1 month.              Charge Capture:         Primary Diagnosis:     E11.9  Type 2 diabetes mellitus without complications           Orders:      88511  Office/outpatient visit; established patient, level 4  (In-House)              I49.9  Cardiac arrhythmia, unspecified     E78.5  Hyperlipidemia, unspecified     K21.9  Gastro-esophageal reflux disease without esophagitis         ADDENDUMS:      ____________________________________    Addendum: 06/01/2021 03:13 PM - Two, Team         Visit Note Faxed to:        [facility]; Number [phone]

## 2021-05-18 NOTE — PROGRESS NOTES
Dacia Napier  1945     Office/Outpatient Visit    Visit Date: Thu, Apr 30, 2020 09:43 am    Provider: Anthony Feng MD (Assistant: Hong Arellano, )    Location: [facility]        Electronically signed by Anthony Feng MD on  04/30/2020 10:17:32 AM                             Subjective:        CC: Ms. Napier is a 74 year old female.  This is her first visit to the clinic.  needs mammogram; [employer] VIDEO [id number].    TELEMEDICINE VISIT:    - Patient consented to this telemedicine visit. Consent obtained by Hong Arellano and Anthony Feng MD.    - Persons present during the telemedicine consultation include:  Patient, Dr. Feng    - This visit is being conducted via "[employer]  with both audio and video.        HPI:           Patient to be evaluated for hyperlipidemia, unspecified.  Current treatment includes Crestor.  Compliance with treatment has been good; she takes her medication as directed and follows up as directed.  She denies experiencing any hypercholesterolemia related symptoms.  She does not know results of any recent lab monitoring.        Dacia is overdue for breast cancer screening and is willing to have a mammogram ordered today.      Dacia notes that she has episodes of tachycardia that occur randomly.  She says her heart rate will go as high as 130 last for up to an hour and usually goes away with vagal maneuvers or carotid massage.  She follows with a cardiologist twice a year.  She says that they have discussed doing a cardiac ablation but she is unsure about this.  She says her cardiologist also told her that without the ablations that her arrhythmia would likely not cause any long-term consequences.  She denies chest pain or shortness of breath.  No edema.      Dacia notes that on a previous blood draw her A1c was found to be elevated at a normal range.  She was diagnosed with prediabetes and takes metformin 500 mg twice daily.  She denies polyphagia, polyuria,  fatigue or weight loss.    ROS:     CONSTITUTIONAL:  Negative for chills, fatigue, fever, and weight change.      EYES:  Negative for blurred vision.      E/N/T:  Negative for ear pain and tinnitus.      CARDIOVASCULAR:  Negative for chest pain, dizziness, palpitations and edema.      RESPIRATORY:  Negative for dyspnea and cough.      GASTROINTESTINAL:  Negative for abdominal pain, diarrhea, heartburn, nausea and vomiting.      GENITOURINARY:  Negative for dysuria, hematuria and polyuria.      HEMATOLOGIC/LYMPHATIC:  Negative for easy bruising and excessive bleeding.      MUSCULOSKELETAL:  Negative for arthralgias and myalgias.      INTEGUMENTARY/BREAST:  Negative for rash, breast mass and skin changes of breast.      NEUROLOGICAL:  Negative for headaches, paresthesias and weakness.      ENDOCRINE:  Negative for hair loss, heat/cold intolerance, polydipsia, and polyphagia.      PSYCHIATRIC:  Negative for anxiety, depression, and sleep disturbances.          Past Medical History / Family History / Social History:         Last Reviewed on 4/30/2020 10:17 AM by Anthony Feng    Past Medical History:             PAST MEDICAL HISTORY         Positive for    Arrhythmia (unknown type) and    Hyperlipidemia;         PREVENTIVE HEALTH MAINTENANCE             COLORECTAL CANCER SCREENING: colonoscopy with normal results; The next colonoscopy is due  2018     MAMMOGRAM: was last done 2019 with normal results The next one is due  Now     PAP SMEAR: No longer indicated due to age and history         Surgical History:         Positive for    Cataract Removal,    Hernia Repair,    Hysterectomy and    Tonsillectomy/Adenoidectomy;         Family History:         Positive for Coronary Artery Disease and Myocardial Infarction;     Positive for Breast Cancer;     Positive for Cerebrovascular Accident;         Social History:     Occupation:    Retired     Marital Status:      Children: 2 children          Tobacco/Alcohol/Supplements:     Last Reviewed on 4/30/2020 10:17 AM by Anthony Feng    Tobacco: She has never smoked.          Substance Abuse History:     Last Reviewed on 4/30/2020 10:17 AM by Anthony Feng    None         Mental Health History:     Last Reviewed on 4/30/2020 10:17 AM by Anthony Feng    NEGATIVE         Communicable Diseases (eg STDs):     Last Reviewed on 4/30/2020 10:17 AM by Anthony Feng        Current Problems:     Last Reviewed on 4/30/2020 10:17 AM by Anthony Fegn    Hyperlipidemia, unspecified    Cardiac arrhythmia, unspecified    Prediabetes    Encounter for screening mammogram for malignant neoplasm of breast        Immunizations:     None        Allergies:     Last Reviewed on 4/30/2020 10:17 AM by Anthony Feng    No Known Allergies.        Current Medications:     Last Reviewed on 4/30/2020 10:17 AM by Anthony Feng    calcium 500 mg   [bid]    aspirin 81 mg oral tablet, delayed release (enteric coated) [take 1 tablet (81 mg) by oral route once daily]    Fish Oil  [bid]    vitamin d3  [once a day]    coq10  [once daily]    vitamin b  [one daily]    multivitamin  [one daily]    Crestor 5 mg oral tablet [take 1 tablet (5 mg) by oral route once daily]    metFORMIN 500 mg oral tablet [take 1 tablet (500 mg) by oral route 2 times per day with morning andevening meals]    omeprazole 10 mg oral capsule,delayed release (enteric coated) [take 1 daily]        Objective:        Exams:     PHYSICAL EXAM:     GENERAL: vital signs recorded - well developed, well nourished;  no apparent distress;     EYES: conjunctiva and cornea are normal;     RESPIRATORY: normal respiratory rate and pattern with no distress;     BREAST/INTEGUMENT: No significant rashes, lesions or suspicious moles within limits of examination/encounter;     NEUROLOGIC: Grossly intact; mental status: alert and oriented x 3;     PSYCHIATRIC: appropriate affect and demeanor; normal speech pattern; Normal behavior;          Assessment:         E78.5   Hyperlipidemia, unspecified       Z12.31   Encounter for screening mammogram for malignant neoplasm of breast       I49.9   Cardiac arrhythmia, unspecified       R73.03   Prediabetes           ORDERS:         Radiology/Test Orders:       [id number]  Screening digital breast tomosynthesis bi  (Send-Out)                      Plan:         Hyperlipidemia, unspecified- Continue Crestor 5 mg daily. Will order lipid panel at next in-person visit.        Encounter for screening mammogram for malignant neoplasm of breast- Mammogram ordered.        RADIOLOGY:  I have ordered Mammogram Bilateral Screening 3D to be done today.            Orders:       [id number]  Screening digital breast tomosynthesis bi  (Send-Out)              Cardiac arrhythmia, unspecified- Continue to follow with cardiology as directed.        Prediabetes- Continue metformin 500 mg twice daily.  Will order labs at next in-person visit.            Charge Capture:         Primary Diagnosis:     E78.5  Hyperlipidemia, unspecified           Orders:      [id number]  Office visit - new pt, level 3  (In-House)              Z12.31  Encounter for screening mammogram for malignant neoplasm of breast     I49.9  Cardiac arrhythmia, unspecified     R73.03  Prediabetes

## 2021-05-22 ENCOUNTER — TRANSCRIBE ORDERS (OUTPATIENT)
Dept: ADMINISTRATIVE | Facility: HOSPITAL | Age: 76
End: 2021-05-22

## 2021-05-22 DIAGNOSIS — Z78.0 POST-MENOPAUSAL: ICD-10-CM

## 2021-05-22 DIAGNOSIS — Z12.31 VISIT FOR SCREENING MAMMOGRAM: Primary | ICD-10-CM

## 2021-05-27 ENCOUNTER — TRANSCRIBE ORDERS (OUTPATIENT)
Dept: ADMINISTRATIVE | Facility: HOSPITAL | Age: 76
End: 2021-05-27

## 2021-05-27 DIAGNOSIS — N26.1 ATROPHIC KIDNEY: Primary | ICD-10-CM

## 2021-06-10 ENCOUNTER — TELEPHONE (OUTPATIENT)
Dept: FAMILY MEDICINE CLINIC | Age: 76
End: 2021-06-10

## 2021-06-10 ENCOUNTER — HOSPITAL ENCOUNTER (OUTPATIENT)
Dept: ULTRASOUND IMAGING | Facility: HOSPITAL | Age: 76
Discharge: HOME | End: 2021-06-10
Admitting: INTERNAL MEDICINE

## 2021-06-10 DIAGNOSIS — N26.1 ATROPHIC KIDNEY: ICD-10-CM

## 2021-06-10 PROCEDURE — 76775 US EXAM ABDO BACK WALL LIM: CPT

## 2021-06-11 ENCOUNTER — TRANSCRIBE ORDERS (OUTPATIENT)
Dept: LAB | Facility: HOSPITAL | Age: 76
End: 2021-06-11

## 2021-06-11 ENCOUNTER — LAB (OUTPATIENT)
Dept: LAB | Facility: HOSPITAL | Age: 76
End: 2021-06-11

## 2021-06-11 DIAGNOSIS — I13.10 MALIGNANT HYPERTENSIVE HEART AND CHRONIC KIDNEY DISEASE STAGE III (HCC): ICD-10-CM

## 2021-06-11 DIAGNOSIS — N18.30 MALIGNANT HYPERTENSIVE HEART AND CHRONIC KIDNEY DISEASE STAGE III (HCC): ICD-10-CM

## 2021-06-11 DIAGNOSIS — I12.9 MALIGNANT HYPERTENSION WITH CHRONIC KIDNEY DISEASE STAGE III (HCC): ICD-10-CM

## 2021-06-11 DIAGNOSIS — N18.30 MALIGNANT HYPERTENSION WITH CHRONIC KIDNEY DISEASE STAGE III (HCC): ICD-10-CM

## 2021-06-11 DIAGNOSIS — I10 HYPERTENSION, ESSENTIAL: ICD-10-CM

## 2021-06-11 LAB
ALBUMIN SERPL-MCNC: 4.7 G/DL (ref 3.5–5.2)
ALBUMIN UR-MCNC: <1.2 MG/DL
ALBUMIN/GLOB SERPL: 1.8 G/DL
ALP SERPL-CCNC: 70 U/L (ref 39–117)
ALT SERPL W P-5'-P-CCNC: 13 U/L (ref 1–33)
ANION GAP SERPL CALCULATED.3IONS-SCNC: 11 MMOL/L (ref 5–15)
AST SERPL-CCNC: 19 U/L (ref 1–32)
BACTERIA UR QL AUTO: ABNORMAL /HPF
BASOPHILS # BLD AUTO: 0.01 10*3/MM3 (ref 0–0.2)
BASOPHILS NFR BLD AUTO: 0.2 % (ref 0–1.5)
BILIRUB SERPL-MCNC: 0.6 MG/DL (ref 0–1.2)
BILIRUB UR QL STRIP: NEGATIVE
BUN SERPL-MCNC: 14 MG/DL (ref 8–23)
BUN/CREAT SERPL: 15.2 (ref 7–25)
CALCIUM SPEC-SCNC: 9.5 MG/DL (ref 8.6–10.5)
CHLORIDE SERPL-SCNC: 100 MMOL/L (ref 98–107)
CLARITY UR: CLEAR
CO2 SERPL-SCNC: 29 MMOL/L (ref 22–29)
COLOR UR: YELLOW
CREAT SERPL-MCNC: 0.92 MG/DL (ref 0.57–1)
CREAT UR-MCNC: 107.6 MG/DL
CREAT UR-MCNC: 97.5 MG/DL
DEPRECATED RDW RBC AUTO: 43.6 FL (ref 37–54)
EOSINOPHIL # BLD AUTO: 0.11 10*3/MM3 (ref 0–0.4)
EOSINOPHIL NFR BLD AUTO: 1.8 % (ref 0.3–6.2)
ERYTHROCYTE [DISTWIDTH] IN BLOOD BY AUTOMATED COUNT: 13.5 % (ref 12.3–15.4)
GFR SERPL CREATININE-BSD FRML MDRD: 60 ML/MIN/1.73
GFR SERPL CREATININE-BSD FRML MDRD: 72 ML/MIN/1.73
GLOBULIN UR ELPH-MCNC: 2.6 GM/DL
GLUCOSE SERPL-MCNC: 114 MG/DL (ref 65–99)
GLUCOSE UR STRIP-MCNC: NEGATIVE MG/DL
HCT VFR BLD AUTO: 41.3 % (ref 34–46.6)
HGB BLD-MCNC: 13 G/DL (ref 12–15.9)
HGB UR QL STRIP.AUTO: ABNORMAL
IMM GRANULOCYTES # BLD AUTO: 0.02 10*3/MM3 (ref 0–0.05)
IMM GRANULOCYTES NFR BLD AUTO: 0.3 % (ref 0–0.5)
KETONES UR QL STRIP: NEGATIVE
LEUKOCYTE ESTERASE UR QL STRIP.AUTO: NEGATIVE
LYMPHOCYTES # BLD AUTO: 1.33 10*3/MM3 (ref 0.7–3.1)
LYMPHOCYTES NFR BLD AUTO: 22.1 % (ref 19.6–45.3)
MCH RBC QN AUTO: 28.1 PG (ref 26.6–33)
MCHC RBC AUTO-ENTMCNC: 31.5 G/DL (ref 31.5–35.7)
MCV RBC AUTO: 89.2 FL (ref 79–97)
MICROALBUMIN/CREAT UR: NORMAL MG/G{CREAT}
MONOCYTES # BLD AUTO: 0.48 10*3/MM3 (ref 0.1–0.9)
MONOCYTES NFR BLD AUTO: 8 % (ref 5–12)
NEUTROPHILS NFR BLD AUTO: 4.06 10*3/MM3 (ref 1.7–7)
NEUTROPHILS NFR BLD AUTO: 67.6 % (ref 42.7–76)
NITRITE UR QL STRIP: NEGATIVE
PH UR STRIP.AUTO: 6.5 [PH] (ref 5–8)
PLATELET # BLD AUTO: 259 10*3/MM3 (ref 140–450)
PMV BLD AUTO: 9.8 FL (ref 6–12)
POTASSIUM SERPL-SCNC: 4.3 MMOL/L (ref 3.5–5.2)
PROT SERPL-MCNC: 7.3 G/DL (ref 6–8.5)
PROT UR QL STRIP: NEGATIVE
PROT UR-MCNC: 8.9 MG/DL
PROT/CREAT UR: 0.1 MG/G{CREAT}
RBC # BLD AUTO: 4.63 10*6/MM3 (ref 3.77–5.28)
RBC # UR: ABNORMAL /HPF
REF LAB TEST METHOD: ABNORMAL
SODIUM SERPL-SCNC: 140 MMOL/L (ref 136–145)
SP GR UR STRIP: 1.02 (ref 1–1.03)
SQUAMOUS #/AREA URNS HPF: ABNORMAL /HPF
UROBILINOGEN UR QL STRIP: ABNORMAL
WBC # BLD AUTO: 6.01 10*3/MM3 (ref 3.4–10.8)
WBC UR QL AUTO: ABNORMAL /HPF

## 2021-06-11 PROCEDURE — 85025 COMPLETE CBC W/AUTO DIFF WBC: CPT

## 2021-06-11 PROCEDURE — 82570 ASSAY OF URINE CREATININE: CPT

## 2021-06-11 PROCEDURE — 82043 UR ALBUMIN QUANTITATIVE: CPT

## 2021-06-11 PROCEDURE — 80053 COMPREHEN METABOLIC PANEL: CPT

## 2021-06-11 PROCEDURE — 81001 URINALYSIS AUTO W/SCOPE: CPT

## 2021-06-11 PROCEDURE — 36415 COLL VENOUS BLD VENIPUNCTURE: CPT

## 2021-06-11 PROCEDURE — 84156 ASSAY OF PROTEIN URINE: CPT

## 2021-06-15 ENCOUNTER — PREP FOR SURGERY (OUTPATIENT)
Dept: OTHER | Facility: HOSPITAL | Age: 76
End: 2021-06-15

## 2021-06-15 ENCOUNTER — OFFICE VISIT (OUTPATIENT)
Dept: SURGERY | Facility: CLINIC | Age: 76
End: 2021-06-15

## 2021-06-15 VITALS — BODY MASS INDEX: 23 KG/M2 | RESPIRATION RATE: 16 BRPM | HEIGHT: 62 IN | WEIGHT: 125 LBS

## 2021-06-15 DIAGNOSIS — R10.13 EPIGASTRIC PAIN: ICD-10-CM

## 2021-06-15 DIAGNOSIS — Z12.11 COLON CANCER SCREENING: Primary | ICD-10-CM

## 2021-06-15 PROCEDURE — 99203 OFFICE O/P NEW LOW 30 MIN: CPT | Performed by: SURGERY

## 2021-06-15 RX ORDER — CHOLECALCIFEROL (VITAMIN D3) 125 MCG
2000 CAPSULE ORAL DAILY
COMMUNITY

## 2021-06-15 RX ORDER — ROSUVASTATIN CALCIUM 10 MG/1
10 TABLET, COATED ORAL NIGHTLY
COMMUNITY
End: 2021-10-27 | Stop reason: SDUPTHER

## 2021-06-15 RX ORDER — CETIRIZINE HYDROCHLORIDE 10 MG/1
10 TABLET ORAL DAILY PRN
COMMUNITY

## 2021-06-15 RX ORDER — ASPIRIN 81 MG/1
TABLET ORAL DAILY
COMMUNITY

## 2021-06-15 RX ORDER — FERROUS SULFATE 325(65) MG
1 TABLET ORAL 2 TIMES DAILY
COMMUNITY
Start: 2021-05-25 | End: 2021-08-26 | Stop reason: SDUPTHER

## 2021-06-15 RX ORDER — OMEPRAZOLE 20 MG/1
20 TABLET, DELAYED RELEASE ORAL DAILY
COMMUNITY
End: 2021-06-18

## 2021-06-15 RX ORDER — EZETIMIBE 10 MG/1
10 TABLET ORAL DAILY
COMMUNITY
End: 2022-01-06 | Stop reason: SDUPTHER

## 2021-06-15 NOTE — PROGRESS NOTES
Inpatient History and Physical Surgical Orders    Preadmission Location:   Preadmission Time:  Facility:  Surgery Date:  Surgery Time:  Preadmission Test date:     Chief Complaint  Outpatient History and Physical / Surgical Orders    Primary Care Provider: Mata Love MD    Referring Provider: Mata Love*    Subjective      Patient Name: Dacia Napier : 1945    HPI  The patient is a 75-year-old female who was referred for colon screening and an EGD.  She has had adenomatous polyps removed and most recently had a polyp 3 years ago on a colonoscopy.  She is also been having some upper abdominal discomfort and also has a history in the past of gastric ulcers.    Past History:  Medical History: has a past medical history of Colon polyp, Diabetes mellitus (CMS/HCC), and Hyperlipidemia.   Surgical History: has no past surgical history on file.   Family History: family history includes Heart disease in her brother, father, mother, and sister.   Social History: reports that she has never smoked. She has never used smokeless tobacco. She reports that she does not drink alcohol.  Allergies: Patient has no known allergies.       Current Outpatient Medications:   •  aspirin (aspirin) 81 MG EC tablet, Adult Low Dose Aspirin 81 mg tablet,delayed release  Take 1 tablet every day by oral route., Disp: , Rfl:   •  Calcium Carbonate (Calcium 600) 1500 (600 Ca) MG tablet, Calcium 600, Disp: , Rfl:   •  cetirizine (zyrTEC) 10 MG tablet, cetirizine 10 mg tablet  Take 1 tablet every day by oral route as needed., Disp: , Rfl:   •  Cholecalciferol (Vitamin D3) 50 MCG (2000 UT) tablet, Vitamin D3, Disp: , Rfl:   •  ezetimibe (ZETIA) 10 MG tablet, Take 10 mg by mouth., Disp: , Rfl:   •  FeroSul 325 (65 Fe) MG tablet, Take 1 tablet by mouth 2 (Two) Times a Day., Disp: , Rfl:   •  FLUTICASONE PROPIONATE, NASAL, NA, fluticasone propionate, Disp: , Rfl:   •  metFORMIN (GLUCOPHAGE) 500 MG tablet, Every 12  "(Twelve) Hours., Disp: , Rfl:   •  omeprazole OTC (PriLOSEC OTC) 20 MG EC tablet, omeprazole 20 mg tablet,delayed release  Take 1 tablet every day by oral route., Disp: , Rfl:   •  rosuvastatin (CRESTOR) 10 MG tablet, rosuvastatin 10 mg tablet  Take 1 tablet every day by oral route., Disp: , Rfl:        Objective   Vital Signs:   Resp 16   Ht 157.5 cm (62\")   Wt 56.7 kg (125 lb)   BMI 22.86 kg/m²       Physical Exam  Vitals and nursing note reviewed.   Constitutional:       Appearance: Normal appearance. He is well-developed.   Cardiovascular:      Rate and Rhythm: Normal rate and regular rhythm.   Pulmonary:      Effort: Pulmonary effort is normal.      Breath sounds: Normal air entry.   Abdominal:      General: Bowel sounds are normal.      Palpations: Abdomen is soft.      Skin:     General: Skin is warm and dry.   Neurological:      Mental Status: He is alert and oriented to person, place, and time.      Motor: Motor function is intact.   Psychiatric:         Mood and Affect: Mood normal.       Result Review :               Assessment and Plan   Diagnoses and all orders for this visit:    1. Colon cancer screening (Primary)    2. Epigastric pain    We will set her up for an EGD and a colonoscopy.  I have described those procedures to her as well as the risk and benefits and she is agreeable to proceeding.    I  Ty Partida MD  06/15/2021    "

## 2021-06-17 ENCOUNTER — TELEPHONE (OUTPATIENT)
Dept: FAMILY MEDICINE CLINIC | Age: 76
End: 2021-06-17

## 2021-06-17 NOTE — TELEPHONE ENCOUNTER
Premarin 0.625mg/gm cream is not covered by insurance. Estradiol cream is covered. If ok, please send to [facility] in [place].

## 2021-06-17 NOTE — TELEPHONE ENCOUNTER
Caller: Dacia Napier    Relationship to patient: Self    Best call back number: [phone]    Patient is needing: PATIENT WAS RETURNING A CALL TO OUR OFFICE.    PLEASE CALL AND ADVISE.

## 2021-06-18 RX ORDER — CHLORAL HYDRATE 500 MG
1000 CAPSULE ORAL DAILY
COMMUNITY

## 2021-06-18 RX ORDER — OMEPRAZOLE 10 MG/1
10 CAPSULE, DELAYED RELEASE ORAL
COMMUNITY
End: 2021-11-05

## 2021-06-21 NOTE — TELEPHONE ENCOUNTER
[facility] pharmacy said Estradiol vaginal cream 0.01% is what the insurance will cover but its not a equalent.  The Premarin 0.625 would need a PA.

## 2021-06-23 ENCOUNTER — HOSPITAL ENCOUNTER (OUTPATIENT)
Dept: MAMMOGRAPHY | Facility: HOSPITAL | Age: 76
Discharge: HOME | End: 2021-06-23

## 2021-06-23 ENCOUNTER — HOSPITAL ENCOUNTER (OUTPATIENT)
Dept: BONE DENSITY | Facility: HOSPITAL | Age: 76
Discharge: HOME | End: 2021-06-23

## 2021-06-23 DIAGNOSIS — Z78.0 POST-MENOPAUSAL: ICD-10-CM

## 2021-06-23 DIAGNOSIS — Z12.31 VISIT FOR SCREENING MAMMOGRAM: ICD-10-CM

## 2021-06-23 PROCEDURE — 77080 DXA BONE DENSITY AXIAL: CPT

## 2021-06-23 PROCEDURE — 77067 SCR MAMMO BI INCL CAD: CPT

## 2021-06-23 PROCEDURE — 77067 SCR MAMMO BI INCL CAD: CPT | Performed by: RADIOLOGY

## 2021-06-23 PROCEDURE — 77063 BREAST TOMOSYNTHESIS BI: CPT | Performed by: RADIOLOGY

## 2021-06-23 PROCEDURE — 77063 BREAST TOMOSYNTHESIS BI: CPT

## 2021-06-25 ENCOUNTER — ANESTHESIA (OUTPATIENT)
Dept: GASTROENTEROLOGY | Facility: HOSPITAL | Age: 76
End: 2021-06-25

## 2021-06-25 ENCOUNTER — ANESTHESIA EVENT (OUTPATIENT)
Dept: GASTROENTEROLOGY | Facility: HOSPITAL | Age: 76
End: 2021-06-25

## 2021-06-25 ENCOUNTER — TRANSCRIBE ORDERS (OUTPATIENT)
Dept: ADMINISTRATIVE | Facility: HOSPITAL | Age: 76
End: 2021-06-25

## 2021-06-25 ENCOUNTER — HOSPITAL ENCOUNTER (OUTPATIENT)
Facility: HOSPITAL | Age: 76
Setting detail: HOSPITAL OUTPATIENT SURGERY
Discharge: HOME | End: 2021-06-25
Attending: SURGERY | Admitting: SURGERY

## 2021-06-25 VITALS
HEART RATE: 59 BPM | BODY MASS INDEX: 22.66 KG/M2 | WEIGHT: 123.9 LBS | DIASTOLIC BLOOD PRESSURE: 61 MMHG | SYSTOLIC BLOOD PRESSURE: 124 MMHG | RESPIRATION RATE: 14 BRPM | OXYGEN SATURATION: 95 % | TEMPERATURE: 97.6 F

## 2021-06-25 DIAGNOSIS — N13.30 HYDRONEPHROSIS, UNSPECIFIED HYDRONEPHROSIS TYPE: Primary | ICD-10-CM

## 2021-06-25 DIAGNOSIS — Z12.11 COLON CANCER SCREENING: ICD-10-CM

## 2021-06-25 PROCEDURE — 88305 TISSUE EXAM BY PATHOLOGIST: CPT | Performed by: SURGERY

## 2021-06-25 PROCEDURE — 25010000002 PROPOFOL 10 MG/ML EMULSION: Performed by: NURSE ANESTHETIST, CERTIFIED REGISTERED

## 2021-06-25 RX ORDER — SODIUM CHLORIDE 0.9 % (FLUSH) 0.9 %
3 SYRINGE (ML) INJECTION EVERY 12 HOURS SCHEDULED
Status: DISCONTINUED | OUTPATIENT
Start: 2021-06-25 | End: 2021-06-25 | Stop reason: HOSPADM

## 2021-06-25 RX ORDER — LIDOCAINE HYDROCHLORIDE 20 MG/ML
INJECTION, SOLUTION INFILTRATION; PERINEURAL AS NEEDED
Status: DISCONTINUED | OUTPATIENT
Start: 2021-06-25 | End: 2021-06-25 | Stop reason: SURG

## 2021-06-25 RX ORDER — PROPOFOL 10 MG/ML
VIAL (ML) INTRAVENOUS AS NEEDED
Status: DISCONTINUED | OUTPATIENT
Start: 2021-06-25 | End: 2021-06-25 | Stop reason: SURG

## 2021-06-25 RX ORDER — SODIUM CHLORIDE 0.9 % (FLUSH) 0.9 %
10 SYRINGE (ML) INJECTION AS NEEDED
Status: DISCONTINUED | OUTPATIENT
Start: 2021-06-25 | End: 2021-06-25 | Stop reason: HOSPADM

## 2021-06-25 RX ORDER — SODIUM CHLORIDE, SODIUM LACTATE, POTASSIUM CHLORIDE, CALCIUM CHLORIDE 600; 310; 30; 20 MG/100ML; MG/100ML; MG/100ML; MG/100ML
30 INJECTION, SOLUTION INTRAVENOUS CONTINUOUS
Status: DISCONTINUED | OUTPATIENT
Start: 2021-06-25 | End: 2021-06-25 | Stop reason: HOSPADM

## 2021-06-25 RX ORDER — ONDANSETRON 4 MG/1
4 TABLET, FILM COATED ORAL ONCE AS NEEDED
Status: DISCONTINUED | OUTPATIENT
Start: 2021-06-25 | End: 2021-06-25 | Stop reason: HOSPADM

## 2021-06-25 RX ORDER — ONDANSETRON 2 MG/ML
4 INJECTION INTRAMUSCULAR; INTRAVENOUS ONCE AS NEEDED
Status: DISCONTINUED | OUTPATIENT
Start: 2021-06-25 | End: 2021-06-25 | Stop reason: HOSPADM

## 2021-06-25 RX ADMIN — PROPOFOL 40 MG: 10 INJECTION, EMULSION INTRAVENOUS at 13:18

## 2021-06-25 RX ADMIN — PROPOFOL 30 MG: 10 INJECTION, EMULSION INTRAVENOUS at 13:45

## 2021-06-25 RX ADMIN — PROPOFOL 30 MG: 10 INJECTION, EMULSION INTRAVENOUS at 13:27

## 2021-06-25 RX ADMIN — PROPOFOL 100 MCG/KG/MIN: 10 INJECTION, EMULSION INTRAVENOUS at 13:20

## 2021-06-25 RX ADMIN — LIDOCAINE HYDROCHLORIDE 60 MG: 20 INJECTION, SOLUTION INFILTRATION; PERINEURAL at 13:16

## 2021-06-25 RX ADMIN — LIDOCAINE HYDROCHLORIDE 40 MG: 20 INJECTION, SOLUTION INFILTRATION; PERINEURAL at 13:20

## 2021-06-25 RX ADMIN — PROPOFOL 40 MG: 10 INJECTION, EMULSION INTRAVENOUS at 13:34

## 2021-06-25 RX ADMIN — PROPOFOL 30 MG: 10 INJECTION, EMULSION INTRAVENOUS at 13:24

## 2021-06-25 RX ADMIN — PROPOFOL 60 MG: 10 INJECTION, EMULSION INTRAVENOUS at 13:13

## 2021-06-25 RX ADMIN — SODIUM CHLORIDE, POTASSIUM CHLORIDE, SODIUM LACTATE AND CALCIUM CHLORIDE 30 ML/HR: 600; 310; 30; 20 INJECTION, SOLUTION INTRAVENOUS at 13:00

## 2021-06-25 NOTE — ANESTHESIA POSTPROCEDURE EVALUATION
Patient: Dacia Napier    Procedure Summary     Date: 06/25/21 Room / Location: [facility] ENDOSCOPY 5 / [facility] ENDOSCOPY    Anesthesia Start: 1313 Anesthesia Stop:     Procedures:       ESOPHAGOGASTRODUODENOSCOPY (N/A )      COLONOSCOPY (N/A ) Diagnosis:       Colon cancer screening      (Colon cancer screening [Z12.11])    Surgeons: Ty Partida MD Provider: Rahul Heart MD    Anesthesia Type: general ASA Status: 2          Anesthesia Type: general    Vitals  Vitals Value Taken Time   BP 93/56 06/25/21 1352   Temp 36.2 °C (97.2 °F) 06/25/21 1352   Pulse 61 06/25/21 1356   Resp 13 06/25/21 1352   SpO2 99 % 06/25/21 1356   Vitals shown include unvalidated device data.        Post Anesthesia Care and Evaluation    Patient location during evaluation: bedside  Patient participation: complete - patient participated  Level of consciousness: awake  Pain score: 0  Pain management: adequate  Airway patency: patent  Anesthetic complications: No anesthetic complications  PONV Status: none  Cardiovascular status: acceptable and stable  Respiratory status: acceptable and room air  Hydration status: acceptable

## 2021-06-28 DIAGNOSIS — N95.2 POSTMENOPAUSAL ATROPHIC VAGINITIS: Primary | ICD-10-CM

## 2021-06-28 RX ORDER — ESTRADIOL 0.1 MG/G
2 CREAM VAGINAL 2 TIMES WEEKLY
Qty: 42.5 G | Refills: 1 | Status: SHIPPED | OUTPATIENT
Start: 2021-06-28 | End: 2021-07-12 | Stop reason: SDUPTHER

## 2021-06-29 LAB
CYTO UR: NORMAL
LAB AP CASE REPORT: NORMAL
LAB AP CLINICAL INFORMATION: NORMAL
PATH REPORT.FINAL DX SPEC: NORMAL
PATH REPORT.GROSS SPEC: NORMAL

## 2021-06-30 ENCOUNTER — HOSPITAL ENCOUNTER (OUTPATIENT)
Dept: CT IMAGING | Facility: HOSPITAL | Age: 76
Discharge: HOME | End: 2021-06-30
Admitting: INTERNAL MEDICINE

## 2021-06-30 DIAGNOSIS — N13.30 HYDRONEPHROSIS, UNSPECIFIED HYDRONEPHROSIS TYPE: ICD-10-CM

## 2021-06-30 PROCEDURE — 74176 CT ABD & PELVIS W/O CONTRAST: CPT

## 2021-07-02 VITALS
WEIGHT: 130.4 LBS | SYSTOLIC BLOOD PRESSURE: 112 MMHG | TEMPERATURE: 96.8 F | DIASTOLIC BLOOD PRESSURE: 74 MMHG | HEART RATE: 85 BPM

## 2021-07-02 VITALS
HEART RATE: 95 BPM | HEIGHT: 62 IN | BODY MASS INDEX: 24.22 KG/M2 | TEMPERATURE: 97.3 F | DIASTOLIC BLOOD PRESSURE: 66 MMHG | SYSTOLIC BLOOD PRESSURE: 108 MMHG | WEIGHT: 131.6 LBS

## 2021-07-07 ENCOUNTER — CLINICAL SUPPORT (OUTPATIENT)
Dept: FAMILY MEDICINE CLINIC | Age: 76
End: 2021-07-07

## 2021-07-07 VITALS — TEMPERATURE: 97.2 F

## 2021-07-07 DIAGNOSIS — M81.0 AGE-RELATED OSTEOPOROSIS WITHOUT CURRENT PATHOLOGICAL FRACTURE: Primary | ICD-10-CM

## 2021-07-07 PROCEDURE — 96372 THER/PROPH/DIAG INJ SC/IM: CPT | Performed by: FAMILY MEDICINE

## 2021-07-07 RX ORDER — PHENAZOPYRIDINE HYDROCHLORIDE 100 MG/1
TABLET, FILM COATED ORAL
COMMUNITY
Start: 2021-05-04 | End: 2022-01-06

## 2021-07-12 ENCOUNTER — OFFICE VISIT (OUTPATIENT)
Dept: UROLOGY | Facility: CLINIC | Age: 76
End: 2021-07-12

## 2021-07-12 VITALS — WEIGHT: 130.6 LBS | RESPIRATION RATE: 16 BRPM | BODY MASS INDEX: 24.03 KG/M2 | HEIGHT: 62 IN

## 2021-07-12 DIAGNOSIS — N13.30 HYDRONEPHROSIS, UNSPECIFIED HYDRONEPHROSIS TYPE: ICD-10-CM

## 2021-07-12 DIAGNOSIS — N95.2 POSTMENOPAUSAL ATROPHIC VAGINITIS: ICD-10-CM

## 2021-07-12 DIAGNOSIS — R32 URINARY INCONTINENCE, UNSPECIFIED TYPE: Primary | ICD-10-CM

## 2021-07-12 LAB
BILIRUB BLD-MCNC: NEGATIVE MG/DL
CLARITY, POC: CLEAR
COLOR UR: YELLOW
GLUCOSE UR STRIP-MCNC: NEGATIVE MG/DL
KETONES UR QL: NEGATIVE
LEUKOCYTE EST, POC: ABNORMAL
NITRITE UR-MCNC: NEGATIVE MG/ML
PH UR: 7 [PH] (ref 5–8)
PROT UR STRIP-MCNC: NEGATIVE MG/DL
RBC # UR STRIP: NEGATIVE /UL
SP GR UR: 1.02 (ref 1–1.03)
UROBILINOGEN UR QL: NORMAL

## 2021-07-12 PROCEDURE — 81003 URINALYSIS AUTO W/O SCOPE: CPT | Performed by: NURSE PRACTITIONER

## 2021-07-12 PROCEDURE — 99203 OFFICE O/P NEW LOW 30 MIN: CPT | Performed by: NURSE PRACTITIONER

## 2021-07-12 RX ORDER — ESTRADIOL 0.1 MG/G
2 CREAM VAGINAL 3 TIMES WEEKLY
Qty: 30 G | Refills: 3 | Status: SHIPPED | OUTPATIENT
Start: 2021-07-12 | End: 2021-08-11

## 2021-07-12 NOTE — PROGRESS NOTES
Chief Complaint: Cystitis (having trouble with uti. have one kidney that is really small. has ct done and she has cyst. not having symptoms today.)    Subjective         History of Present Illness  Dacia Napier is a 75 y.o. female presents to [facility] to be seen for recurrent UTI.    She often has symptoms of low back pain, burning with urination and urinary urgency.    She states that she has been without a UTI the last 3 times she was checked.     She was prescribed vaginal estrace cream on 6/28/21 but has not started it due to insurance company not paying for it.      The patient states that she feels as if her urinary urgency and frequency may be related to her Prolia injection as it worsens around the time that she is having the injection done.    The patient states that she has an atrophic left kidney and has had imaging performed recently related to this.  Her renal ultrasound performed on 6/10/2021 she was was found to have right-sided moderate hydronephrosis.A CT scan of the abdomen and pelvis without IV contrast was performed on 6/30/2021 which reveals mild right-sided hydronephrosis with multiple parapelvic renal cysts making complete evaluation difficult.  There is no evidence of hydroureter.  The left kidney is atrophic.    Objective     Past Medical History:   Diagnosis Date   • Colon polyp    • Diabetes mellitus (CMS/HCC)    • GERD (gastroesophageal reflux disease)    • Hiatal hernia    • Hyperlipidemia    • Rapid or irregular heartbeat    • Seasonal allergies        Past Surgical History:   Procedure Laterality Date   • CATARACT EXTRACTION, BILATERAL     • COLONOSCOPY     • COLONOSCOPY N/A 6/25/2021    Procedure: COLONOSCOPY;  Surgeon: Ty Partida MD;  Location: [facility] ENDOSCOPY;  Service: General;  Laterality: N/A;  Colon polyp   • ENDOSCOPY N/A 6/25/2021    Procedure: ESOPHAGOGASTRODUODENOSCOPY;  Surgeon: Ty Partida MD;  Location: [facility] ENDOSCOPY;   Service: General;  Laterality: N/A;  Hiatal Hernia   • HERNIA REPAIR     • HYSTERECTOMY     • LASIK     • TONSILLECTOMY           Current Outpatient Medications:   •  denosumab (XGEVA) 120 MG/1.7ML solution injection, Inject  under the skin into the appropriate area as directed 1 (One) Time., Disp: , Rfl:   •  aspirin (aspirin) 81 MG EC tablet, , Disp: , Rfl:   •  Calcium Carbonate (Calcium 600) 1500 (600 Ca) MG tablet, 2 (Two) Times a Day With Meals., Disp: , Rfl:   •  cetirizine (zyrTEC) 10 MG tablet, Take 10 mg by mouth Daily As Needed., Disp: , Rfl:   •  Cholecalciferol (Vitamin D3) 50 MCG (2000 UT) tablet, Take 2,000 Units by mouth Daily., Disp: , Rfl:   •  estradiol (ESTRACE VAGINAL) 0.1 MG/GM vaginal cream, Insert 2 g into the vagina 3 (Three) Times a Week for 30 days., Disp: 30 g, Rfl: 3  •  ezetimibe (ZETIA) 10 MG tablet, Take 10 mg by mouth Daily., Disp: , Rfl:   •  FeroSul 325 (65 Fe) MG tablet, Take 1 tablet by mouth 2 (Two) Times a Day., Disp: , Rfl:   •  FLUTICASONE PROPIONATE, NASAL, NA, 1 spray into the nostril(s) as directed by provider Daily As Needed., Disp: , Rfl:   •  metFORMIN (GLUCOPHAGE) 500 MG tablet, Take 500 mg by mouth 2 (Two) Times a Day With Meals., Disp: , Rfl:   •  Omega-3 1000 MG capsule, Take 1,000 mg by mouth Daily., Disp: , Rfl:   •  omeprazole (prilOSEC) 10 MG capsule, 10 mg., Disp: , Rfl:   •  phenazopyridine (PYRIDIUM) 100 MG tablet, TAKE 1 TABLET BY MOUTH THREE TIMES DAILY FOR 3 DAYS AS NEEDED, Disp: , Rfl:   •  rosuvastatin (CRESTOR) 10 MG tablet, Take 10 mg by mouth Every Night., Disp: , Rfl:     No Known Allergies     Family History   Problem Relation Age of Onset   • Heart disease Mother    • Heart disease Father    • Heart disease Sister    • Heart disease Brother    • Malig Hyperthermia Neg Hx        Social History     Socioeconomic History   • Marital status:      Spouse name: Not on file   • Number of children: Not on file   • Years of education: Not on file   •  "Highest education level: Not on file   Tobacco Use   • Smoking status: Never Smoker   • Smokeless tobacco: Never Used   Vaping Use   • Vaping Use: Never used   Substance and Sexual Activity   • Alcohol use: Never   • Drug use: Never   • Sexual activity: Defer       Vital Signs:   Resp 16   Ht 157.5 cm (62\")   Wt 59.2 kg (130 lb 9.6 oz)   BMI 23.89 kg/m²      Physical Exam     Result Review :   The following data was reviewed by: JANES Zuñiga on 07/12/2021:  Results for orders placed or performed in visit on 07/12/21   POC Urinalysis Dipstick, Automated    Specimen: Urine   Result Value Ref Range    Color Yellow Yellow, Straw, Dark Yellow, Tammi    Clarity, UA Clear Clear    Specific Gravity  1.020 1.005 - 1.030    pH, Urine 7.0 5.0 - 8.0    Leukocytes Trace (A) Negative    Nitrite, UA Negative Negative    Protein, POC Negative Negative mg/dL    Glucose, UA Negative Negative, 1000 mg/dL (3+) mg/dL    Ketones, UA Negative Negative    Urobilinogen, UA Normal Normal    Bilirubin Negative Negative    Blood, UA Negative Negative            Procedures        Assessment and Plan    Diagnoses and all orders for this visit:    1. Urinary incontinence, unspecified type (Primary)  -     POC Urinalysis Dipstick, Automated    2. Hydronephrosis, unspecified hydronephrosis type  -     US Retroperitnl Abd, Leftd; Future    3. Postmenopausal atrophic vaginitis  -     estradiol (ESTRACE VAGINAL) 0.1 MG/GM vaginal cream; Insert 2 g into the vagina 3 (Three) Times a Week for 30 days.  Dispense: 30 g; Refill: 3      Discussed with the patient that her urinary urgency and frequency may be related to her Prolia injection as previously thought.  Did discuss with patient that her dysuria may be related to atrophic vaginitis which is a common condition affecting postmenopausal women over the age of 65.  We will send in a prescription for compounded vaginal Estrace cream as this medication is not covered by her insurance.  We will " follow-up with her in 3 months with a repeat renal ultrasound to evaluate her hydronephrosis.  To discuss at that point in time if patient's hydronephrosis has not resolved she may require further diagnostic work-up with a cystoscopy and unilateral retrograde pyelogram.    I spent 35 minutes caring for Dacia on this date of service. This time includes time spent by me in the following activities:reviewing tests, obtaining and/or reviewing a separately obtained history, performing a medically appropriate examination and/or evaluation , counseling and educating the patient/family/caregiver, ordering medications, tests, or procedures, and documenting information in the medical record  Follow Up   Return in about 3 months (around 10/12/2021) for f/u renal u/s.  Patient was given instructions and counseling regarding her condition or for health maintenance advice. Please see specific information pulled into the AVS if appropriate.

## 2021-07-13 ENCOUNTER — OFFICE VISIT (OUTPATIENT)
Dept: SURGERY | Facility: CLINIC | Age: 76
End: 2021-07-13

## 2021-07-13 VITALS — WEIGHT: 131 LBS | BODY MASS INDEX: 24.11 KG/M2 | RESPIRATION RATE: 16 BRPM | HEIGHT: 62 IN

## 2021-07-13 DIAGNOSIS — Z12.11 COLON CANCER SCREENING: Primary | ICD-10-CM

## 2021-07-13 DIAGNOSIS — R10.13 EPIGASTRIC PAIN: ICD-10-CM

## 2021-07-13 PROCEDURE — 99212 OFFICE O/P EST SF 10 MIN: CPT | Performed by: SURGERY

## 2021-07-13 NOTE — PROGRESS NOTES
Chief Complaint  Post-op Follow-up    Subjective          Dacia Napier presents to [facility]  History of Present Illness    Dacia Napier is a 75 y.o. female  who presents today for a postoperative visit.     Patient is here for a follow-up after an EGD and colonoscopy.  Her EGD was notable for the presence of a moderate size hiatal hernia but was otherwise unremarkable.  Her biopsies came back fine.  She did have a small polyp removed from her transverse colon and this came back consistent with a tubular adenoma without dysplasia.  She is mostly complaining of constipation today.    Past History:  Medical History: has a past medical history of Colon polyp, Diabetes mellitus (CMS/HCC), GERD (gastroesophageal reflux disease), Hiatal hernia, Hyperlipidemia, Rapid or irregular heartbeat, and Seasonal allergies.   Surgical History: has a past surgical history that includes Tonsillectomy; Hernia repair; Hysterectomy; LASIK; Cataract extraction, bilateral; Colonoscopy; Esophagogastroduodenoscopy (N/A, 6/25/2021); and Colonoscopy (N/A, 6/25/2021).   Family History: family history includes Heart disease in her brother, father, mother, and sister.   Social History: reports that she has never smoked. She has never used smokeless tobacco. She reports that she does not drink alcohol and does not use drugs.  Allergies: Patient has no known allergies.       Current Outpatient Medications:   •  aspirin (aspirin) 81 MG EC tablet, , Disp: , Rfl:   •  Calcium Carbonate (Calcium 600) 1500 (600 Ca) MG tablet, 2 (Two) Times a Day With Meals., Disp: , Rfl:   •  cetirizine (zyrTEC) 10 MG tablet, Take 10 mg by mouth Daily As Needed., Disp: , Rfl:   •  Cholecalciferol (Vitamin D3) 50 MCG (2000 UT) tablet, Take 2,000 Units by mouth Daily., Disp: , Rfl:   •  denosumab (XGEVA) 120 MG/1.7ML solution injection, Inject  under the skin into the appropriate area as directed 1 (One) Time., Disp: , Rfl:   •   "estradiol (ESTRACE VAGINAL) 0.1 MG/GM vaginal cream, Insert 2 g into the vagina 3 (Three) Times a Week for 30 days., Disp: 30 g, Rfl: 3  •  ezetimibe (ZETIA) 10 MG tablet, Take 10 mg by mouth Daily., Disp: , Rfl:   •  FeroSul 325 (65 Fe) MG tablet, Take 1 tablet by mouth 2 (Two) Times a Day., Disp: , Rfl:   •  FLUTICASONE PROPIONATE, NASAL, NA, 1 spray into the nostril(s) as directed by provider Daily As Needed., Disp: , Rfl:   •  metFORMIN (GLUCOPHAGE) 500 MG tablet, Take 500 mg by mouth 2 (Two) Times a Day With Meals., Disp: , Rfl:   •  Omega-3 1000 MG capsule, Take 1,000 mg by mouth Daily., Disp: , Rfl:   •  omeprazole (prilOSEC) 10 MG capsule, 10 mg., Disp: , Rfl:   •  phenazopyridine (PYRIDIUM) 100 MG tablet, TAKE 1 TABLET BY MOUTH THREE TIMES DAILY FOR 3 DAYS AS NEEDED, Disp: , Rfl:   •  rosuvastatin (CRESTOR) 10 MG tablet, Take 10 mg by mouth Every Night., Disp: , Rfl:        Physical Exam  Appears well today and her abdomen is soft  Objective     Vital Signs:   Resp 16   Ht 157.5 cm (62\")   Wt 59.4 kg (131 lb)   BMI 23.96 kg/m²              Assessment and Plan    Diagnoses and all orders for this visit:    1. Colon cancer screening (Primary)    2. Epigastric pain    We have recommended trying some mineral oil or prune juice to see if that would help her bowel movements.  Otherwise I will see her back on an as-needed basis.      "

## 2021-07-19 NOTE — TELEPHONE ENCOUNTER
Caller: Dacia Napier    Relationship: Self    Best call back number: [phone]    Medication needed:   LANCETS  TEST STRIPS  FOR 1 TOUCH ULTRA MINI    When do you need the refill by: 07/19/21    What additional details did the patient provide when requesting the medication:     Does the patient have less than a 3 day supply:  [x] Yes  [] No    What is the patient's preferred pharmacy: [facility] #[id number] - [address] AT [address] [phone] [facility] [phone]

## 2021-07-20 RX ORDER — LANCETS
EACH MISCELLANEOUS
Qty: 100 EACH | Refills: 12 | Status: SHIPPED | OUTPATIENT
Start: 2021-07-20 | End: 2021-07-23 | Stop reason: SDUPTHER

## 2021-07-22 ENCOUNTER — TELEPHONE (OUTPATIENT)
Dept: FAMILY MEDICINE CLINIC | Age: 76
End: 2021-07-22

## 2021-07-22 DIAGNOSIS — E11.9 TYPE 2 DIABETES MELLITUS WITHOUT COMPLICATION, WITHOUT LONG-TERM CURRENT USE OF INSULIN (HCC): Primary | ICD-10-CM

## 2021-07-22 NOTE — TELEPHONE ENCOUNTER
Caller: [facility] #[id number] - [place], [place] - [address] AT [place] [address] [address] - [phone] [facility] [phone] FX    Relationship to patient: Pharmacy    Best call back number: [phone]    Patient is needing: PHARMACY IS NEEDING A NEW SCRIPT SENT OVER WITH DIRECTIONS FOR     Lancets (onetouch ultrasoft) lancets  glucose blood test strip    DIRECTIONS MUST STATE HOW MANY TIMES A DAY PATIENT IS TESTING.

## 2021-07-23 DIAGNOSIS — E11.9 DIABETES MELLITUS WITHOUT COMPLICATION (HCC): Primary | ICD-10-CM

## 2021-07-24 RX ORDER — LANCETS
EACH MISCELLANEOUS
Qty: 100 EACH | Refills: 12 | Status: SHIPPED | OUTPATIENT
Start: 2021-07-24 | End: 2021-07-30

## 2021-07-29 NOTE — TELEPHONE ENCOUNTER
PATIENT CALLED AND STATED THAT ORDER HAS TO BE SPECIFIC ON HOW MANY TIMES A DAY TESTS STRIPS AND LANCETS ARE TO BE USED. IT MUST BE A SINGLE NUMBER FOR MEDICARE TO APPROVE. PLEASE CALL PATIENT TO UPDATE ON ORDER.

## 2021-07-30 ENCOUNTER — HOSPITAL ENCOUNTER (OUTPATIENT)
Dept: ULTRASOUND IMAGING | Facility: HOSPITAL | Age: 76
Discharge: HOME | End: 2021-07-30
Admitting: NURSE PRACTITIONER

## 2021-07-30 DIAGNOSIS — N13.30 HYDRONEPHROSIS, UNSPECIFIED HYDRONEPHROSIS TYPE: ICD-10-CM

## 2021-07-30 PROCEDURE — 76775 US EXAM ABDO BACK WALL LIM: CPT

## 2021-07-30 RX ORDER — LANCETS
1 EACH MISCELLANEOUS 2 TIMES DAILY
Qty: 100 EACH | Refills: 11 | Status: SHIPPED | OUTPATIENT
Start: 2021-07-30 | End: 2022-06-03

## 2021-07-30 RX ORDER — LANCETS
1 EACH MISCELLANEOUS 2 TIMES DAILY
COMMUNITY
End: 2021-07-30

## 2021-07-30 RX ORDER — OMEPRAZOLE 20 MG/1
CAPSULE, DELAYED RELEASE ORAL
Qty: 180 CAPSULE | Refills: 3 | Status: SHIPPED | OUTPATIENT
Start: 2021-07-30 | End: 2021-09-01 | Stop reason: ALTCHOICE

## 2021-08-17 ENCOUNTER — TELEPHONE (OUTPATIENT)
Dept: FAMILY MEDICINE CLINIC | Age: 76
End: 2021-08-17

## 2021-08-17 NOTE — TELEPHONE ENCOUNTER
Pt would like a call back from nurse regarding her diabetic supplies. She states she has been trying to get them for about a week now.

## 2021-08-17 NOTE — TELEPHONE ENCOUNTER
Diabetic supply form for [facility] faxed 2 times already. Received another form today. Talked to pt and she ask we have the doctor to sign it again and send back, she needs her supplies. Form on Dr Love's desk to sign and will fax.

## 2021-08-19 ENCOUNTER — TELEPHONE (OUTPATIENT)
Dept: FAMILY MEDICINE CLINIC | Age: 76
End: 2021-08-19

## 2021-08-19 NOTE — TELEPHONE ENCOUNTER
Caller: [facility] #[id number] - [place], [place] - [address] AT [facility] [place] [address] [address] - [phone] [facility] [phone] FX    Relationship to patient: Pharmacy    Best call back number: [phone]    Patient is needing: CRISTO FROM [facility] CALLED REGARDING A FORM THAT IS NEEDED TO BE FAXED OVER BY PATIENTS PCP FOR HER DIABETIC SUPPLIES. SHE STATED THEY HAVE BEEN WAITING FOR THIS FOR FORM THE PAST 3 WEEKS AND NEVER RECEIVED ANYTHING BACK AND IS NEEDING THIS ASAP SO THE PATIENT CAN GET THEIR SUPPLIES. PLEASE ADVISE THANK YOU.         HUB UNABLE TO WARM TRANSFER

## 2021-08-20 NOTE — TELEPHONE ENCOUNTER
Refaxed diabetes supplies request to [facility] for the 3rd time. Have been faxing it to the toll free number listed on the form [phone]. Refaxed it to that number and the [facility] number [phone].  Called [facility] in [place] to tell them I refaxed and make sure they got it. Was on hold for 30 min and then got disconnected.  Called pt and told her.

## 2021-08-26 RX ORDER — FERROUS SULFATE 325(65) MG
TABLET ORAL
Qty: 60 TABLET | Refills: 3 | Status: SHIPPED | OUTPATIENT
Start: 2021-08-26

## 2021-09-01 ENCOUNTER — LAB (OUTPATIENT)
Dept: LAB | Facility: HOSPITAL | Age: 76
End: 2021-09-01

## 2021-09-01 ENCOUNTER — TRANSCRIBE ORDERS (OUTPATIENT)
Dept: ADMINISTRATIVE | Facility: HOSPITAL | Age: 76
End: 2021-09-01

## 2021-09-01 ENCOUNTER — OFFICE VISIT (OUTPATIENT)
Dept: FAMILY MEDICINE CLINIC | Age: 76
End: 2021-09-01

## 2021-09-01 VITALS
HEART RATE: 91 BPM | TEMPERATURE: 98.1 F | WEIGHT: 131.2 LBS | SYSTOLIC BLOOD PRESSURE: 120 MMHG | DIASTOLIC BLOOD PRESSURE: 77 MMHG | BODY MASS INDEX: 24.14 KG/M2 | HEIGHT: 62 IN

## 2021-09-01 DIAGNOSIS — E11.9 TYPE 2 DIABETES MELLITUS WITHOUT COMPLICATION, WITHOUT LONG-TERM CURRENT USE OF INSULIN (HCC): Primary | ICD-10-CM

## 2021-09-01 DIAGNOSIS — N13.30 HYDRONEPHROSIS, UNSPECIFIED HYDRONEPHROSIS TYPE: ICD-10-CM

## 2021-09-01 DIAGNOSIS — E78.00 HIGH CHOLESTEROL: ICD-10-CM

## 2021-09-01 DIAGNOSIS — K21.9 HIATAL HERNIA WITH GERD WITHOUT ESOPHAGITIS: ICD-10-CM

## 2021-09-01 DIAGNOSIS — I47.9 PAROXYSMAL TACHYCARDIA (HCC): ICD-10-CM

## 2021-09-01 DIAGNOSIS — K44.9 HIATAL HERNIA WITH GERD WITHOUT ESOPHAGITIS: ICD-10-CM

## 2021-09-01 DIAGNOSIS — N13.30 HYDRONEPHROSIS, UNSPECIFIED HYDRONEPHROSIS TYPE: Primary | ICD-10-CM

## 2021-09-01 DIAGNOSIS — E11.9 DIABETES MELLITUS WITHOUT COMPLICATION (HCC): ICD-10-CM

## 2021-09-01 PROBLEM — M81.0 POSTMENOPAUSAL OSTEOPOROSIS: Status: ACTIVE | Noted: 2021-09-01

## 2021-09-01 PROBLEM — R10.13 EPIGASTRIC PAIN: Status: RESOLVED | Noted: 2021-06-15 | Resolved: 2021-09-01

## 2021-09-01 PROBLEM — I47.1 SUPRAVENTRICULAR TACHYCARDIA: Status: ACTIVE | Noted: 2020-02-10

## 2021-09-01 PROBLEM — Z12.11 COLON CANCER SCREENING: Status: RESOLVED | Noted: 2021-06-15 | Resolved: 2021-09-01

## 2021-09-01 PROBLEM — Q60.0: Status: ACTIVE | Noted: 2021-06-24

## 2021-09-01 PROBLEM — N26.1 ATROPHY OF KIDNEY: Status: ACTIVE | Noted: 2021-06-24

## 2021-09-01 PROBLEM — I47.10 SUPRAVENTRICULAR TACHYCARDIA: Status: ACTIVE | Noted: 2020-02-10

## 2021-09-01 LAB
ALBUMIN SERPL-MCNC: 4.6 G/DL (ref 3.5–5.2)
ALBUMIN/GLOB SERPL: 1.6 G/DL
ALP SERPL-CCNC: 88 U/L (ref 39–117)
ALT SERPL W P-5'-P-CCNC: 11 U/L (ref 1–33)
ANION GAP SERPL CALCULATED.3IONS-SCNC: 8.5 MMOL/L (ref 5–15)
AST SERPL-CCNC: 16 U/L (ref 1–32)
BACTERIA UR QL AUTO: ABNORMAL /HPF
BILIRUB SERPL-MCNC: 0.3 MG/DL (ref 0–1.2)
BILIRUB UR QL STRIP: NEGATIVE
BUN SERPL-MCNC: 14 MG/DL (ref 8–23)
BUN/CREAT SERPL: 15.2 (ref 7–25)
CALCIUM SPEC-SCNC: 9.6 MG/DL (ref 8.6–10.5)
CHLORIDE SERPL-SCNC: 103 MMOL/L (ref 98–107)
CHOLEST SERPL-MCNC: 121 MG/DL (ref 0–200)
CLARITY UR: CLEAR
CO2 SERPL-SCNC: 27.5 MMOL/L (ref 22–29)
COLOR UR: YELLOW
CREAT SERPL-MCNC: 0.92 MG/DL (ref 0.57–1)
CREAT UR-MCNC: 95.6 MG/DL
GFR SERPL CREATININE-BSD FRML MDRD: 60 ML/MIN/1.73
GLOBULIN UR ELPH-MCNC: 2.9 GM/DL
GLUCOSE SERPL-MCNC: 130 MG/DL (ref 65–99)
GLUCOSE UR STRIP-MCNC: NEGATIVE MG/DL
HBA1C MFR BLD: 6.3 % (ref 4.8–5.6)
HDLC SERPL-MCNC: 54 MG/DL (ref 40–60)
HGB UR QL STRIP.AUTO: ABNORMAL
HYALINE CASTS UR QL AUTO: ABNORMAL /LPF
KETONES UR QL STRIP: NEGATIVE
LDLC SERPL CALC-MCNC: 42 MG/DL (ref 0–100)
LDLC/HDLC SERPL: 0.7 {RATIO}
LEUKOCYTE ESTERASE UR QL STRIP.AUTO: NEGATIVE
NITRITE UR QL STRIP: NEGATIVE
PH UR STRIP.AUTO: 5.5 [PH] (ref 5–8)
POTASSIUM SERPL-SCNC: 4.7 MMOL/L (ref 3.5–5.2)
PROT SERPL-MCNC: 7.5 G/DL (ref 6–8.5)
PROT UR QL STRIP: NEGATIVE
PROT UR-MCNC: 12 MG/DL
PROT/CREAT UR: 0.1 MG/G{CREAT}
RBC # UR: ABNORMAL /HPF
REF LAB TEST METHOD: ABNORMAL
SODIUM SERPL-SCNC: 139 MMOL/L (ref 136–145)
SP GR UR STRIP: 1.02 (ref 1–1.03)
SQUAMOUS #/AREA URNS HPF: ABNORMAL /HPF
TRIGL SERPL-MCNC: 146 MG/DL (ref 0–150)
UROBILINOGEN UR QL STRIP: ABNORMAL
VLDLC SERPL-MCNC: 25 MG/DL (ref 5–40)
WBC UR QL AUTO: ABNORMAL /HPF

## 2021-09-01 PROCEDURE — 82043 UR ALBUMIN QUANTITATIVE: CPT

## 2021-09-01 PROCEDURE — 80061 LIPID PANEL: CPT | Performed by: FAMILY MEDICINE

## 2021-09-01 PROCEDURE — 99214 OFFICE O/P EST MOD 30 MIN: CPT | Performed by: FAMILY MEDICINE

## 2021-09-01 PROCEDURE — 81001 URINALYSIS AUTO W/SCOPE: CPT

## 2021-09-01 PROCEDURE — 83036 HEMOGLOBIN GLYCOSYLATED A1C: CPT | Performed by: FAMILY MEDICINE

## 2021-09-01 PROCEDURE — 36415 COLL VENOUS BLD VENIPUNCTURE: CPT | Performed by: FAMILY MEDICINE

## 2021-09-01 PROCEDURE — 80053 COMPREHEN METABOLIC PANEL: CPT | Performed by: FAMILY MEDICINE

## 2021-09-01 PROCEDURE — 84156 ASSAY OF PROTEIN URINE: CPT

## 2021-09-01 PROCEDURE — 82570 ASSAY OF URINE CREATININE: CPT

## 2021-09-01 NOTE — ASSESSMENT & PLAN NOTE
IMPROVED CURRENTLY.  PLANS PER GENERAL SURGERY.  NO CHANGE TO CURRENT MEDS.  GENERAL SURGERY NOTES REVIEWED.

## 2021-09-01 NOTE — ASSESSMENT & PLAN NOTE
Diabetes is improving with treatment.   Continue current treatment regimen.  Diabetes will be reassessed in 6 months   PER HER REQUEST SHE WILL SEE JOE DAILEY FOR GENERAL ADVICE.

## 2021-09-01 NOTE — PROGRESS NOTES
Chief Complaint  Hyperlipidemia    Subjective          Dacia Napier presents to [facility]  --LAST HGA1C WAS NEAR 7 %.  FEELS SHE IS DOING WELL BUT PLANS TO SEE JOE DAILEY FOR MORE SPECIFIC ADVICE AND TREATMENT  --TOLERATING STATIN WELL.  LAST LIPIDS WERE OK  --S/P RECENT EGD AND COLONOSCOPY.  HIATAL HERNIA WAS VERIFIED AND BENIGN POLYPS WERE FOUND.  SYMPTOMS HAVE IMPROVED  --STILL WITH OCCASIONAL EPISODES OF TACHYCARDIA.  SAW A CARDIOLOGIST IN THE PAST BUT IS UNSURE OF THE DIAGNOSIS.  CURRENT CARDIOLOGY APPT IS PENDING.            No Known Allergies     Health Maintenance Due   Topic Date Due   • Pneumococcal Vaccine 65+ (1 of 2 - PPSV23) Never done   • TDAP/TD VACCINES (1 - Tdap) Never done   • ZOSTER VACCINE (1 of 2) Never done   • HEPATITIS C SCREENING  Never done   • ANNUAL WELLNESS VISIT  06/17/2021   • DIABETIC FOOT EXAM  Never done   • DIABETIC EYE EXAM  06/17/2021        Current Outpatient Medications on File Prior to Visit   Medication Sig   • aspirin (aspirin) 81 MG EC tablet    • Calcium Carbonate (Calcium 600) 1500 (600 Ca) MG tablet 2 (Two) Times a Day With Meals.   • cetirizine (zyrTEC) 10 MG tablet Take 10 mg by mouth Daily As Needed.   • Cholecalciferol (Vitamin D3) 50 MCG (2000 UT) tablet Take 2,000 Units by mouth Daily.   • denosumab (XGEVA) 120 MG/1.7ML solution injection Inject  under the skin into the appropriate area as directed 1 (One) Time.   • ezetimibe (ZETIA) 10 MG tablet Take 10 mg by mouth Daily.   • FeroSul 325 (65 Fe) MG tablet TAKE 1 TABLET BY MOUTH 2 TIMES DAILY (Patient taking differently: Take 325 mg by mouth Every Other Day.)   • FLUTICASONE PROPIONATE, NASAL, NA 1 spray into the nostril(s) as directed by provider Daily As Needed.   • glucose blood test strip 1 each by Other route 2 (Two) Times a Day. Check blood sugar bid   • Lancets (onetouch ultrasoft) lancets 1 each by Other route 2 (Two) Times a Day. Use as instructed   • metFORMIN  "(GLUCOPHAGE) 500 MG tablet Take 500 mg by mouth 2 (Two) Times a Day With Meals.   • Omega-3 1000 MG capsule Take 1,000 mg by mouth Daily.   • omeprazole (prilOSEC) 10 MG capsule 10 mg.   • rosuvastatin (CRESTOR) 10 MG tablet Take 10 mg by mouth Every Night.   • omeprazole (priLOSEC) 20 MG capsule TAKE 1 CAPSULE BY MOUTH TWICE DAILY. DO NOT CRUSH CAPSULE   • phenazopyridine (PYRIDIUM) 100 MG tablet TAKE 1 TABLET BY MOUTH THREE TIMES DAILY FOR 3 DAYS AS NEEDED     No current facility-administered medications on file prior to visit.         There is no immunization history on file for this patient.    Review of Systems   Constitutional: Negative for appetite change, chills, fatigue and fever.   HENT: Negative for ear pain, rhinorrhea and sore throat.    Eyes: Negative for blurred vision and discharge.   Respiratory: Negative for cough and shortness of breath.    Cardiovascular: Negative for chest pain, palpitations and leg swelling.   Gastrointestinal: Negative for abdominal pain, nausea and vomiting.   Genitourinary: Negative for dysuria and hematuria.   Musculoskeletal: Negative for arthralgias and myalgias.   Neurological: Negative for headache.        Objective     /77 (BP Location: Left arm, Patient Position: Sitting)   Pulse 91   Temp 98.1 °F (36.7 °C) (Oral)   Ht 157.5 cm (62\")   Wt 59.5 kg (131 lb 3.2 oz)   BMI 24.00 kg/m²       Physical Exam  Vitals and nursing note reviewed.   Constitutional:       General: She is not in acute distress.     Appearance: Normal appearance.   Eyes:      Conjunctiva/sclera: Conjunctivae normal.   Cardiovascular:      Rate and Rhythm: Normal rate and regular rhythm.      Heart sounds: No murmur heard.     Pulmonary:      Effort: Pulmonary effort is normal.      Breath sounds: Normal breath sounds.   Abdominal:      Palpations: Abdomen is soft.      Tenderness: There is no abdominal tenderness.   Musculoskeletal:         General: No swelling.      Cervical back: Neck " supple.   Lymphadenopathy:      Cervical: No cervical adenopathy.   Neurological:      General: No focal deficit present.      Mental Status: She is alert.      Cranial Nerves: No cranial nerve deficit.      Coordination: Coordination normal.      Gait: Gait normal.   Psychiatric:         Mood and Affect: Mood normal.         Behavior: Behavior normal.         Result Review :                             Assessment and Plan      Diagnoses and all orders for this visit:    1. Type 2 diabetes mellitus without complication, without long-term current use of insulin (CMS/HCC) (Primary)  Assessment & Plan:  Diabetes is improving with treatment.   Continue current treatment regimen.  Diabetes will be reassessed in 6 months   PER HER REQUEST SHE WILL SEE JOE DAILEY FOR GENERAL ADVICE.    Orders:  -     Comprehensive Metabolic Panel  -     Lipid Panel  -     Hemoglobin A1c    2. Paroxysmal tachycardia (CMS/HCC)  Assessment & Plan:  CURRENTLY STABLE.  SHE WILL SEE CARDIOLOGY SOON FOR A MORE SPECIFIC DIAGNOSIS.        3. High cholesterol  Assessment & Plan:  Lipid abnormalities are improving with treatment.  Nutritional counseling was provided.  Lipids will be reassessed in 6 months.      4. Hiatal hernia with GERD without esophagitis  Assessment & Plan:  IMPROVED CURRENTLY.  PLANS PER GENERAL SURGERY.  NO CHANGE TO CURRENT MEDS.  GENERAL SURGERY NOTES REVIEWED.              Follow Up     Return in about 4 weeks (around 9/29/2021).    Patient was given instructions and counseling regarding her condition or for health maintenance advice. Please see specific information pulled into the AVS if appropriate.       Answers for HPI/ROS submitted by the patient on 8/13/2021  Please describe your symptoms.: Adomonisl pain  Have you had these symptoms before?: Yes  How long have you been having these symptoms?: Greater than 2 weeks  Please list any medications you are currently taking for this condition.: N/a  Please describe any  probable cause for these symptoms. : Kiney opening to bladder is narrow alloeing back up  What is the primary reason for your visit?: Other

## 2021-09-02 LAB
ALBUMIN UR-MCNC: <1.2 MG/DL
CREAT UR-MCNC: 92.9 MG/DL
MICROALBUMIN/CREAT UR: NORMAL MG/G{CREAT}

## 2021-09-16 PROBLEM — N39.0 RECURRENT URINARY TRACT INFECTION: Status: ACTIVE | Noted: 2021-09-16

## 2021-09-16 PROBLEM — N13.30 HYDRONEPHROSIS: Status: ACTIVE | Noted: 2021-09-16

## 2021-09-16 NOTE — PROGRESS NOTES
Chief Complaint    Urologic complaint    Subjective          Dacia Napier presents to [facility] UROLOGY  History of Present Illness     75 y.o. female presents referred by nurse practitioner for follow-up on recurrent UTI moderate right-sided hydronephrosis and atrophic left kidney.    Patient has had some bilateral suprapubic/groin pain.  Is been going on about 8 months.  No flank pain.  This is intermittent in nature.  Goes away at times.  Happens mainly early in the morning.  No straining to void.    Patient was referred by her PCP secondary to urgency and frequency of negative for urinary tract infection.    9/21 0.9, GFR 60  7/30/2021 renal ultrasound - Marked right-sided hydronephrosis likely related to right UPJ, marked left renal atrophy  6/10/2021 renal ultrasound-right-sided moderate hydronephrosis  6/30/2021 CT abdomen/pelvis without - mild right-sided hydronephrosis with multiple parapelvic renal cyst no evidence of hydroureter, left kidney is atrophic    9/21 0-2 RBC  7/21 UA-trace blood, no micro  5/21 UA-micro negative for blood or infection    Placed on Estrace vaginal cream last month by nurse practitioner for dysuria/recurrent UTI    Status post Covid vaccination    no gross hematuria, No history of kidney stone.    No urologic family history,   Has never had any urologic surgery.    No cardiopulmonary history.  Patient does not smoke.  Aspirin 81 and fish oil    Urine culture    2/21 Pseudomonas  1/21 E. coli    Previous    She often has symptoms of low back pain, burning with urination and urinary urgency.     She states that she has been without a UTI the last 3 times she was checked.      She was prescribed vaginal estrace cream on 6/28/21 but has not started it due to insurance company not paying for it.       The patient states that she feels as if her urinary urgency and frequency may be related to her Prolia injection as it worsens around the time that she is having the  injection done.     Results for orders placed or performed in visit on 09/20/21   Bladder Scan   Result Value Ref Range    Volume 0             Past History:  Medical History: has a past medical history of Colon polyp, Diabetes mellitus (CMS/HCC), GERD (gastroesophageal reflux disease), Hiatal hernia, Hyperlipidemia, Rapid or irregular heartbeat, and Seasonal allergies.   Surgical History: has a past surgical history that includes Tonsillectomy; Hernia repair; Hysterectomy; LASIK; Cataract extraction, bilateral; Colonoscopy; Esophagogastroduodenoscopy (N/A, 6/25/2021); and Colonoscopy (N/A, 6/25/2021).   Family History: family history includes Heart disease in her brother, father, mother, and sister.   Social History: reports that she has never smoked. She has never used smokeless tobacco. She reports that she does not drink alcohol and does not use drugs.  Allergies: Patient has no known allergies.       Current Outpatient Medications:   •  aspirin (aspirin) 81 MG EC tablet, , Disp: , Rfl:   •  Calcium Carbonate (Calcium 600) 1500 (600 Ca) MG tablet, 2 (Two) Times a Day With Meals., Disp: , Rfl:   •  cetirizine (zyrTEC) 10 MG tablet, Take 10 mg by mouth Daily As Needed., Disp: , Rfl:   •  Cholecalciferol (Vitamin D3) 50 MCG (2000 UT) tablet, Take 2,000 Units by mouth Daily., Disp: , Rfl:   •  denosumab (XGEVA) 120 MG/1.7ML solution injection, Inject  under the skin into the appropriate area as directed 1 (One) Time., Disp: , Rfl:   •  ezetimibe (ZETIA) 10 MG tablet, Take 10 mg by mouth Daily., Disp: , Rfl:   •  FeroSul 325 (65 Fe) MG tablet, TAKE 1 TABLET BY MOUTH 2 TIMES DAILY (Patient taking differently: Take 325 mg by mouth Every Other Day.), Disp: 60 tablet, Rfl: 3  •  FLUTICASONE PROPIONATE, NASAL, NA, 1 spray into the nostril(s) as directed by provider Daily As Needed., Disp: , Rfl:   •  glucose blood test strip, 1 each by Other route 2 (Two) Times a Day. Check blood sugar bid, Disp: 100 each, Rfl: 11  •   Lancets (onetouch ultrasoft) lancets, 1 each by Other route 2 (Two) Times a Day. Use as instructed, Disp: 100 each, Rfl: 11  •  metFORMIN (GLUCOPHAGE) 500 MG tablet, Take 500 mg by mouth 2 (Two) Times a Day With Meals., Disp: , Rfl:   •  Omega-3 1000 MG capsule, Take 1,000 mg by mouth Daily., Disp: , Rfl:   •  omeprazole (prilOSEC) 10 MG capsule, 10 mg., Disp: , Rfl:   •  phenazopyridine (PYRIDIUM) 100 MG tablet, TAKE 1 TABLET BY MOUTH THREE TIMES DAILY FOR 3 DAYS AS NEEDED, Disp: , Rfl:   •  rosuvastatin (CRESTOR) 10 MG tablet, Take 10 mg by mouth Every Night., Disp: , Rfl:      Physical exam       Alert and orient x3  Well appearing, well developed, in no acute distress   Unlabored respirations    Grossly oriented to person, place and time, judgment is intact, normal mood and affect    Results for orders placed or performed in visit on 09/01/21   Microalbumin / Creatinine Urine Ratio - Urine, Clean Catch    Specimen: Urine, Clean Catch   Result Value Ref Range    Microalbumin/Creatinine Ratio      Creatinine, Urine 92.9 mg/dL    Microalbumin, Urine <1.2 mg/dL   Protein / Creatinine Ratio, Urine - Urine, Clean Catch    Specimen: Urine, Clean Catch   Result Value Ref Range    Creatinine, Urine 95.6 mg/dL    Total Protein, Urine 12.0 mg/dL    Protein/Creatinine Ratio, Urine 0.1    Urinalysis without microscopic (no culture) - Urine, Clean Catch    Specimen: Urine, Clean Catch   Result Value Ref Range    Color, UA Yellow Yellow, Straw    Appearance, UA Clear Clear    pH, UA 5.5 5.0 - 8.0    Specific Gravity, UA 1.020 1.005 - 1.030    Glucose, UA Negative Negative    Ketones, UA Negative Negative    Bilirubin, UA Negative Negative    Blood, UA Trace (A) Negative    Protein, UA Negative Negative    Leuk Esterase, UA Negative Negative    Nitrite, UA Negative Negative    Urobilinogen, UA 0.2 E.U./dL 0.2 - 1.0 E.U./dL   Urinalysis, Microscopic Only - Urine, Clean Catch    Specimen: Urine, Clean Catch   Result Value Ref  Range    RBC, UA 0-2 (A) None Seen /HPF    WBC, UA 0-2 (A) None Seen /HPF    Bacteria, UA None Seen None Seen /HPF    Squamous Epithelial Cells, UA 0-2 None Seen, 0-2 /HPF    Hyaline Casts, UA None Seen None Seen /LPF    Methodology Manual Light Microscopy         Objective     Vital Signs:   There were no vitals taken for this visit.             Assessment and Plan    Diagnoses and all orders for this visit:    1. Recurrent urinary tract infection (Primary)    2. Hydronephrosis, unspecified hydronephrosis type      Recurrent urinary tract infection    Continue vaginal Estrace cream      Right Hydronephrosis, left atrophic kidney    We will plan on renal scan with Lasix to make sure her right kidney is draining without issue.  After we see her back with his wife to decide whether or not she would like to proceed with cystoscopy with ureteroscopy to rule out underlying pathology.    Patient still having some urgency/frequency and suprapubic pain.  Not that bothersome at this time we will follow this conservatively

## 2021-09-20 ENCOUNTER — OFFICE VISIT (OUTPATIENT)
Dept: UROLOGY | Facility: CLINIC | Age: 76
End: 2021-09-20

## 2021-09-20 VITALS — BODY MASS INDEX: 24.11 KG/M2 | RESPIRATION RATE: 17 BRPM | HEIGHT: 62 IN | WEIGHT: 131 LBS

## 2021-09-20 DIAGNOSIS — N13.30 HYDRONEPHROSIS, UNSPECIFIED HYDRONEPHROSIS TYPE: ICD-10-CM

## 2021-09-20 DIAGNOSIS — N39.0 RECURRENT URINARY TRACT INFECTION: Primary | ICD-10-CM

## 2021-09-20 LAB — SPECIMEN VOL 24H UR: 0 L

## 2021-09-20 PROCEDURE — 51798 US URINE CAPACITY MEASURE: CPT | Performed by: UROLOGY

## 2021-09-20 PROCEDURE — 99213 OFFICE O/P EST LOW 20 MIN: CPT | Performed by: UROLOGY

## 2021-09-23 ENCOUNTER — TELEPHONE (OUTPATIENT)
Dept: FAMILY MEDICINE CLINIC | Age: 76
End: 2021-09-23

## 2021-09-23 DIAGNOSIS — E11.9 TYPE 2 DIABETES MELLITUS WITHOUT COMPLICATION, WITHOUT LONG-TERM CURRENT USE OF INSULIN (HCC): Primary | ICD-10-CM

## 2021-09-23 NOTE — TELEPHONE ENCOUNTER
Caller: [id number] - [facility] [address] [place] [address] [address] AT [place] [address] & [facility] - [phone] [facility] [phone] FX    Relationship: Pharmacy    Best call back number: [phone]    Equipment requested: ACCUCHECK GUIDE METER    Prescribing Provider: DR. RAMOS    Additional information or concerns: PATIENT HAS TEST STRIPS AND LANCETS BUT NO METER.

## 2021-10-11 ENCOUNTER — HOSPITAL ENCOUNTER (OUTPATIENT)
Dept: NUCLEAR MEDICINE | Facility: HOSPITAL | Age: 76
Discharge: HOME | End: 2021-10-11
Admitting: UROLOGY

## 2021-10-11 VITALS
HEART RATE: 76 BPM | SYSTOLIC BLOOD PRESSURE: 119 MMHG | DIASTOLIC BLOOD PRESSURE: 69 MMHG | RESPIRATION RATE: 20 BRPM | OXYGEN SATURATION: 93 %

## 2021-10-11 DIAGNOSIS — N13.30 HYDRONEPHROSIS, UNSPECIFIED HYDRONEPHROSIS TYPE: ICD-10-CM

## 2021-10-11 PROCEDURE — A9562 TC99M MERTIATIDE: HCPCS | Performed by: UROLOGY

## 2021-10-11 PROCEDURE — 25010000002 FUROSEMIDE PER 20 MG: Performed by: RADIOLOGY

## 2021-10-11 PROCEDURE — 0 TECHNETIUM MERTIATIDE: Performed by: UROLOGY

## 2021-10-11 PROCEDURE — 78708 K FLOW/FUNCT IMAGE W/DRUG: CPT

## 2021-10-11 RX ORDER — FUROSEMIDE 10 MG/ML
17 INJECTION INTRAMUSCULAR; INTRAVENOUS ONCE
Status: COMPLETED | OUTPATIENT
Start: 2021-10-11 | End: 2021-10-11

## 2021-10-11 RX ORDER — FUROSEMIDE 10 MG/ML
INJECTION INTRAMUSCULAR; INTRAVENOUS
Status: DISCONTINUED
Start: 2021-10-11 | End: 2021-10-12 | Stop reason: HOSPADM

## 2021-10-11 RX ADMIN — TECHNESCAN TC 99M MERTIATIDE 1 DOSE: 1 INJECTION, POWDER, LYOPHILIZED, FOR SOLUTION INTRAVENOUS at 13:08

## 2021-10-11 RX ADMIN — FUROSEMIDE 17 MG: 10 INJECTION, SOLUTION INTRAVENOUS at 13:17

## 2021-10-11 NOTE — NURSING NOTE
Outpatient here for renal scan study per Dr. Paredes for UTI, upper pelvic pain. Pt states NKA. Denies any home blood pressure meds or diuretics. Not pregnant. States weight 125lbs/56.8kg. Lasix 17mg/1.7ml given IVP per protocol. Pt tolerated well. No c/o pain.

## 2021-10-22 ENCOUNTER — OFFICE VISIT (OUTPATIENT)
Dept: DIABETES SERVICES | Facility: CLINIC | Age: 76
End: 2021-10-22

## 2021-10-22 VITALS
SYSTOLIC BLOOD PRESSURE: 143 MMHG | TEMPERATURE: 97.3 F | OXYGEN SATURATION: 100 % | WEIGHT: 131.39 LBS | DIASTOLIC BLOOD PRESSURE: 65 MMHG | HEART RATE: 64 BPM | BODY MASS INDEX: 24.18 KG/M2 | HEIGHT: 62 IN

## 2021-10-22 DIAGNOSIS — R73.03 PREDIABETES: Primary | ICD-10-CM

## 2021-10-22 PROCEDURE — 99213 OFFICE O/P EST LOW 20 MIN: CPT | Performed by: NURSE PRACTITIONER

## 2021-10-22 NOTE — PROGRESS NOTES
Chief Complaint  Diabetes (est care, has been on metformin for a while and is have stomach problems )    Referred By: Mata Love,*    Subjective          Dacia Napier presents to [facility] for diabetes medication management    History of Present Illness    Visit type:  an initial evaluation  Diabetes type:  Prediabetes  Age at time of diagnosis/Number of years:  15 years  Current diabetes status/concerns/issues: The patient presents to the office today seeking to establish care.  She states she has had prediabetes for several years and has been treated with Metformin for the duration.  She was initially treated with 250 mg twice a day and it was increased 5 or 6 years ago.  She has had chronic constipation for years with lower abdominal pain and has some mild nausea as well.  She has had multiple exams which have not identified the source.  She is wondering if the Metformin may be contributing to her GI distress.  She would like to come off the medication and start something different if needed.    Other health concerns: Left Hydronephrosis  Diabetes symptoms:    Polyuria: No   Polydipsia: No   Polyphagia: No   Blurred vision: No   Excessive fatigue: No  Diabetes complications:  Neuropathy:No  Nephropathy:No  Retinopathy:No  Amputation/Wounds:No  Gastroparesis:No  Cardiovascular Disease:No  Erectile Dysfunction:N/A  Hospitalizations secondary to DM?  No  ER/911 Secondary to Dm?  No  Hypoglycemia:  None reported at this time  Hypoglycemia Symptoms:  No hypoglycemia at this time  Current Diabetes treatment:  Metformin 500 mg twice a day  Prior diabetes treatments:  Was lower dose of metformin 250 mg twice a day and was increased to current dose about 5 years  Blood glucose device:  Meter  Blood glucose monitoring frequency:  1  Blood glucose range/average:    Diet:  Avoids high carb/sweet foods, Diet drinks only  Activity:  walking    Past Medical History:    Diagnosis Date   • Colon polyp    • Diabetes mellitus (HCC)    • GERD (gastroesophageal reflux disease)    • Hiatal hernia    • Hyperlipidemia    • Rapid or irregular heartbeat    • Seasonal allergies    • Type 2 diabetes mellitus (HCC)      Past Surgical History:   Procedure Laterality Date   • CATARACT EXTRACTION, BILATERAL     • COLONOSCOPY     • COLONOSCOPY N/A 6/25/2021    Procedure: COLONOSCOPY;  Surgeon: Ty Partida MD;  Location: [facility] ENDOSCOPY;  Service: General;  Laterality: N/A;  Colon polyp   • ENDOSCOPY N/A 6/25/2021    Procedure: ESOPHAGOGASTRODUODENOSCOPY;  Surgeon: Ty Partida MD;  Location: [facility] ENDOSCOPY;  Service: General;  Laterality: N/A;  Hiatal Hernia   • HERNIA REPAIR     • HYSTERECTOMY     • LASIK     • TONSILLECTOMY       Family History   Problem Relation Age of Onset   • Heart disease Mother    • Heart disease Father    • Heart disease Sister    • Heart disease Brother    • Malig Hyperthermia Neg Hx      Social History     Socioeconomic History   • Marital status:    Tobacco Use   • Smoking status: Never Smoker   • Smokeless tobacco: Never Used   Vaping Use   • Vaping Use: Never used   Substance and Sexual Activity   • Alcohol use: Never   • Drug use: Never   • Sexual activity: Defer     No Known Allergies    Current Outpatient Medications:   •  aspirin (aspirin) 81 MG EC tablet, , Disp: , Rfl:   •  Blood Glucose Monitoring Suppl device, 2 each 2 (two) times a day., Disp: 1 each, Rfl: 0  •  Calcium Carbonate (Calcium 600) 1500 (600 Ca) MG tablet, 2 (Two) Times a Day With Meals., Disp: , Rfl:   •  cetirizine (zyrTEC) 10 MG tablet, Take 10 mg by mouth Daily As Needed., Disp: , Rfl:   •  Cholecalciferol (Vitamin D3) 50 MCG (2000 UT) tablet, Take 2,000 Units by mouth Daily., Disp: , Rfl:   •  denosumab (XGEVA) 120 MG/1.7ML solution injection, Inject  under the skin into the appropriate area as directed 1 (One) Time., Disp: , Rfl:   •  ezetimibe (ZETIA) 10 MG tablet,  Take 10 mg by mouth Daily., Disp: , Rfl:   •  FeroSul 325 (65 Fe) MG tablet, TAKE 1 TABLET BY MOUTH 2 TIMES DAILY (Patient taking differently: Take 325 mg by mouth Every Other Day.), Disp: 60 tablet, Rfl: 3  •  FLUTICASONE PROPIONATE, NASAL, NA, 1 spray into the nostril(s) as directed by provider Daily As Needed., Disp: , Rfl:   •  Glucose Blood (ACCU-CHEK GUIDE VI), by In Vitro route., Disp: , Rfl:   •  glucose blood test strip, 1 each by Other route 2 (Two) Times a Day. Check blood sugar bid, Disp: 100 each, Rfl: 11  •  Lancets (onetouch ultrasoft) lancets, 1 each by Other route 2 (Two) Times a Day. Use as instructed, Disp: 100 each, Rfl: 11  •  metFORMIN (GLUCOPHAGE) 500 MG tablet, Take 500 mg by mouth 2 (Two) Times a Day With Meals., Disp: , Rfl:   •  Omega-3 1000 MG capsule, Take 1,000 mg by mouth Daily., Disp: , Rfl:   •  omeprazole (prilOSEC) 10 MG capsule, 10 mg., Disp: , Rfl:   •  rosuvastatin (CRESTOR) 10 MG tablet, Take 10 mg by mouth Every Night., Disp: , Rfl:   •  phenazopyridine (PYRIDIUM) 100 MG tablet, TAKE 1 TABLET BY MOUTH THREE TIMES DAILY FOR 3 DAYS AS NEEDED, Disp: , Rfl:     Review of Systems   Constitutional: Negative for activity change, appetite change, fatigue, fever, unexpected weight gain and unexpected weight loss.   HENT: Negative for congestion, ear pain, facial swelling, hearing loss, sore throat and tinnitus.    Eyes: Negative for blurred vision, double vision, redness and visual disturbance.   Respiratory: Negative for cough, shortness of breath and wheezing.    Cardiovascular: Negative for chest pain, palpitations and leg swelling.   Gastrointestinal: Positive for abdominal pain (lower abdominal pain) and constipation. Negative for abdominal distention, diarrhea, nausea, vomiting, GERD and indigestion.   Endocrine: Negative for polydipsia, polyphagia and polyuria.   Genitourinary: Negative for difficulty urinating, frequency and urgency.   Musculoskeletal: Negative for back pain,  "gait problem and myalgias.   Skin: Negative for rash, skin lesions and bruise.   Neurological: Negative for seizures, speech difficulty, weakness, headache and confusion.   Psychiatric/Behavioral: Negative for sleep disturbance, depressed mood and stress. The patient is not nervous/anxious.         Objective     Vitals:    10/22/21 0913   BP: 143/65   BP Location: Right arm   Patient Position: Sitting   Cuff Size: Adult   Pulse: 64   Temp: 97.3 °F (36.3 °C)   SpO2: 100%   Weight: 59.6 kg (131 lb 6.3 oz)   Height: 157.5 cm (62\")   PainSc: 0-No pain     Body mass index is 24.03 kg/m².    Physical Exam  Constitutional:       Appearance: Normal appearance. She is normal weight.   HENT:      Head: Normocephalic and atraumatic.      Right Ear: External ear normal.      Left Ear: External ear normal.      Nose: Nose normal.   Eyes:      Extraocular Movements: Extraocular movements intact.      Conjunctiva/sclera: Conjunctivae normal.   Pulmonary:      Effort: Pulmonary effort is normal.   Musculoskeletal:         General: Normal range of motion.      Cervical back: Normal range of motion.   Skin:     General: Skin is warm and dry.   Neurological:      General: No focal deficit present.      Mental Status: She is alert and oriented to person, place, and time. Mental status is at baseline.   Psychiatric:         Mood and Affect: Mood normal.         Behavior: Behavior normal.         Thought Content: Thought content normal.         Judgment: Judgment normal.         Result Review :   The following data was reviewed by: JANES Betancourt on 10/22/2021:        Hemoglobin A1c collected on 9/1/2021 was 6.3% indicating prediabetes.  The patient states she has never had an A1c of 6.5 or higher and this result is the highest she has had.    Most Recent A1C    HGBA1C Most Recent 9/1/21   Hemoglobin A1C 6.30 (A)   (A) Abnormal value              A1C Last 3 Results    HGBA1C Last 3 Results 4/16/21 9/1/21   Hemoglobin A1C 6.0 " (A) 6.30 (A)   (A) Abnormal value       Comments are available for some flowsheets but are not being displayed.                   Assessment: This patient has prediabetes currently treated with Metformin and having GI distress.  The patient may do well without any oral hypoglycemic agents to control glucose levels as she follows a controlled diabetic diet and is physically active.      Diagnoses and all orders for this visit:    1. Prediabetes (Primary)        Plan: At this time we will stop the Metformin and the patient will monitor her glucose levels to see if she experiences any rising numbers.  She will also see if she has relief of her GI symptoms.  We will schedule her for follow-up in 2 months and we will recheck her glucose levels and her A1c to determine if the patient needs to be started on an alternate medication.    The patient will monitor her blood glucose levels 1-2 times each day.  If she develops hypoglycemia or experiences any increased frequency or severity of hypoglycemia, she will contact the office for further instructions.        Follow Up     Return in about 2 months (around 12/22/2021) for Medication Management.    Patient was given instructions and counseling regarding her condition or for health maintenance advice. Please see specific information pulled into the AVS if appropriate.     Annabelle Buckley, APRN  10/22/2021

## 2021-10-27 RX ORDER — ROSUVASTATIN CALCIUM 10 MG/1
10 TABLET, COATED ORAL NIGHTLY
Qty: 90 TABLET | Refills: 0 | Status: SHIPPED | OUTPATIENT
Start: 2021-10-27 | End: 2022-01-06 | Stop reason: SDUPTHER

## 2021-10-27 NOTE — TELEPHONE ENCOUNTER
Rx Refill Note  Requested Prescriptions     Pending Prescriptions Disp Refills   • rosuvastatin (CRESTOR) 10 MG tablet       Sig: Take 1 tablet by mouth Every Night.      Last office visit with prescribing clinician: 9/1/2021      Next office visit with prescribing clinician: 1/6/2022       {TIP  Please add Last Relevant Lab Date if appropriate   Lipid Panel    Lipid Panel 4/16/21 9/1/21   Total Cholesterol  121   Total Cholesterol 94 (A)    Triglycerides 145 146   HDL Cholesterol 44 54   VLDL Cholesterol 29 25   LDL Cholesterol  21 (A) 42   LDL/HDL Ratio  0.70   (A) Abnormal value       Comments are available for some flowsheets but are not being displayed.              {TIP  Is Refill Pharmacy correct?YES  Teena Perdue  10/27/21, 14:49 EDT

## 2021-11-05 ENCOUNTER — TELEMEDICINE (OUTPATIENT)
Dept: FAMILY MEDICINE CLINIC | Age: 76
End: 2021-11-05

## 2021-11-05 DIAGNOSIS — J01.00 ACUTE MAXILLARY SINUSITIS, RECURRENCE NOT SPECIFIED: Primary | ICD-10-CM

## 2021-11-05 PROCEDURE — 99213 OFFICE O/P EST LOW 20 MIN: CPT | Performed by: NURSE PRACTITIONER

## 2021-11-05 RX ORDER — BLOOD-GLUCOSE METER
EACH MISCELLANEOUS
COMMUNITY
Start: 2021-09-23 | End: 2022-03-11

## 2021-11-05 RX ORDER — PREDNISOLONE ACETATE 10 MG/ML
SUSPENSION/ DROPS OPHTHALMIC
COMMUNITY
Start: 2021-10-29 | End: 2022-01-20

## 2021-11-05 RX ORDER — AMOXICILLIN 500 MG/1
500 TABLET, FILM COATED ORAL 3 TIMES DAILY
Qty: 30 TABLET | Refills: 0 | Status: SHIPPED | OUTPATIENT
Start: 2021-11-05 | End: 2021-11-15

## 2021-11-05 RX ORDER — ESTRADIOL 0.1 MG/G
CREAM VAGINAL
COMMUNITY
Start: 2021-09-23 | End: 2021-12-22

## 2021-11-05 RX ORDER — OMEPRAZOLE 20 MG/1
CAPSULE, DELAYED RELEASE ORAL
COMMUNITY
Start: 2021-10-27 | End: 2022-01-06 | Stop reason: DRUGHIGH

## 2021-11-05 RX ORDER — FLUCONAZOLE 150 MG/1
150 TABLET ORAL ONCE
Qty: 1 TABLET | Refills: 0 | Status: SHIPPED | OUTPATIENT
Start: 2021-11-05 | End: 2021-11-05

## 2021-11-05 NOTE — PROGRESS NOTES
Mode of Visit: Video  You have chosen to receive care through a telehealth visit.  Do you consent to use a video/audio connection for your medical care today? Yes    The visit included audio and video interaction. No technical issues occurred during this visit.    Subjective       Chief Complaint   Patient presents with   • Sinus Problem     Usually gets sinus infection in the fall time, congestion, cough, headache, and sore throat. Denies Shortness of breath, nausea, vomiting and dfiarrhea. No exposure to knowledge. Reports fully vaccinated   • [employer] Video     [phone]         HPI:        Dacia Napier is a 76 y.o. female who presents to  [facility]  [facility] today     Sinus infection -     Telehealth apt for sinus pressure , headache, deep cough since last Sunday, due to weather change. No fever. Taking Mucinex DM and Nyquil at night , which has helped some but not going away. Has been vaccinated for Covid, has not been out of the house and no exposure to Covid.         PE:   Objective   There were no vitals taken for this visit.    There is no height or weight on file to calculate BMI.    Physical Exam   Constitutional: She appears well-developed and well-nourished.   Eyes: Pupils are equal, round, and reactive to light.   Pulmonary/Chest: Effort normal.   Musculoskeletal: Normal range of motion.   Neurological: She is alert.   Psychiatric: She has a normal mood and affect.                              A/P:     Assessment/Plan   Diagnoses and all orders for this visit:    1. Acute maxillary sinusitis, recurrence not specified (Primary)  -     amoxicillin (AMOXIL) 500 MG tablet; Take 1 tablet by mouth 3 (Three) Times a Day for 10 days.  Dispense: 30 tablet; Refill: 0    Other orders  -     fluconazole (Diflucan) 150 MG tablet; Take 1 tablet by mouth 1 (One) Time for 1 dose.  Dispense: 1 tablet; Refill: 0             Follow up:    Return if symptoms worsen  or fail to improve.

## 2021-11-18 NOTE — PROGRESS NOTES
Chief Complaint    Urologic complaint    Subjective          Dacia Napier presents to [facility] UROLOGY  History of Present Illness     75 y.o. female f/u on recurrent UTI/ moderate right-sided hydronephrosis and atrophic left kidney.    Patient is no longer having pain.  At the stop Metformin and she thought it was temporally related to this.    On Premarin vaginal cream last month by nurse practitioner for dysuria/recurrent UTI    Voiding okay, no gross hematuria dysuria or UTI since her last visit.  Nocturia is also improved since been on Premarin.    10/21 creatinine 0.9, GFR 55    10/21  Renal Scan -no obstruction or hydronephrosis of the right kidney.  Appearance on CT and ultrasound likely represent parapelvic renal cyst.  Atrophic nonfunctioning left kidney    6/30/2021 CT abdomen/pelvis without - mild right-sided hydronephrosis with multiple parapelvic renal cyst no evidence of hydroureter, left kidney is atrophic. LEft kidney  only about 10% the size of normal kidney.  Has a small 4 mm stone looks to be in the parenchyma  Images reviewed today    Previous    9/21 0.9, GFR 60  7/30/2021 renal ultrasound - Marked right-sided hydronephrosis likely related to right UPJ, marked left renal atrophy  6/10/2021 renal ultrasound-right-sided moderate hydronephrosis  6/30/2021 CT abdomen/pelvis without - mild right-sided hydronephrosis with multiple parapelvic renal cyst no evidence of hydroureter, left kidney is atrophic. LEft kidney  only about 10% the size of normal kidney.  Has a small 4 mm stone looks to be in the parenchyma  Images reviewed today    9/21 0-2 RBC  7/21 UA-trace blood, no micro  5/21 UA-micro negative for blood or infection    Status post Covid vaccination    no gross hematuria, No history of kidney stone.    No urologic family history,   Has never had any urologic surgery.    No cardiopulmonary history.  Patient does not smoke.  Aspirin 81 and fish oil    Urine culture    2/21  Pseudomonas  1/21 E. coli    She often has symptoms of low back pain, burning with urination and urinary urgency.     She states that she has been without a UTI the last 3 times she was checked.      She was prescribed vaginal estrace cream on 6/28/21 but has not started it due to insurance company not paying for it.       The patient states that she feels as if her urinary urgency and frequency may be related to her Prolia injection as it worsens around the time that she is having the injection done.     Results for orders placed or performed in visit on 09/20/21   Bladder Scan   Result Value Ref Range    Volume 0             Past History:  Medical History: has a past medical history of Colon polyp, Diabetes mellitus (HCC), GERD (gastroesophageal reflux disease), Hiatal hernia, Hyperlipidemia, Rapid or irregular heartbeat, Seasonal allergies, and Type 2 diabetes mellitus (HCC).   Surgical History: has a past surgical history that includes Tonsillectomy; Hernia repair; Hysterectomy; LASIK; Cataract extraction, bilateral; Colonoscopy; Esophagogastroduodenoscopy (N/A, 6/25/2021); and Colonoscopy (N/A, 6/25/2021).   Family History: family history includes Heart disease in her brother, father, mother, and sister.   Social History: reports that she has never smoked. She has never used smokeless tobacco. She reports that she does not drink alcohol and does not use drugs.  Allergies: Patient has no known allergies.       Current Outpatient Medications:   •  aspirin (aspirin) 81 MG EC tablet, , Disp: , Rfl:   •  Blood Glucose Monitoring Suppl (Accu-Chek Guide) w/Device kit, USE TO TEST BLOOD SUGAR, Disp: , Rfl:   •  Blood Glucose Monitoring Suppl device, 2 each 2 (two) times a day., Disp: 1 each, Rfl: 0  •  Calcium Carbonate (Calcium 600) 1500 (600 Ca) MG tablet, 2 (Two) Times a Day With Meals., Disp: , Rfl:   •  cetirizine (zyrTEC) 10 MG tablet, Take 10 mg by mouth Daily As Needed., Disp: , Rfl:   •  Cholecalciferol  (Vitamin D3) 50 MCG (2000 UT) tablet, Take 2,000 Units by mouth Daily., Disp: , Rfl:   •  denosumab (XGEVA) 120 MG/1.7ML solution injection, Inject  under the skin into the appropriate area as directed 1 (One) Time., Disp: , Rfl:   •  estradiol (ESTRACE) 0.1 MG/GM vaginal cream, INSERT 2 GRAMS VAGINALLY 2 TIMES A WEEK, Disp: , Rfl:   •  ezetimibe (ZETIA) 10 MG tablet, Take 10 mg by mouth Daily., Disp: , Rfl:   •  FeroSul 325 (65 Fe) MG tablet, TAKE 1 TABLET BY MOUTH 2 TIMES DAILY (Patient taking differently: Take 325 mg by mouth Every Other Day.), Disp: 60 tablet, Rfl: 3  •  FLUTICASONE PROPIONATE, NASAL, NA, 1 spray into the nostril(s) as directed by provider Daily As Needed., Disp: , Rfl:   •  Glucose Blood (ACCU-CHEK GUIDE VI), by In Vitro route., Disp: , Rfl:   •  glucose blood test strip, 1 each by Other route 2 (Two) Times a Day. Check blood sugar bid, Disp: 100 each, Rfl: 11  •  Lancets (onetouch ultrasoft) lancets, 1 each by Other route 2 (Two) Times a Day. Use as instructed, Disp: 100 each, Rfl: 11  •  Omega-3 1000 MG capsule, Take 1,000 mg by mouth Daily., Disp: , Rfl:   •  omeprazole (priLOSEC) 20 MG capsule, TAKE 1 CAPSULE BY MOUTH TWICE DAILY. DO NOT CRUSH CAPSULE, Disp: , Rfl:   •  phenazopyridine (PYRIDIUM) 100 MG tablet, TAKE 1 TABLET BY MOUTH THREE TIMES DAILY FOR 3 DAYS AS NEEDED, Disp: , Rfl:   •  prednisoLONE acetate (PRED FORTE) 1 % ophthalmic suspension, INSTILL 1 DROP INTO AFFECTED EYE FOUR TIMES DAILY FOR 4 DAYS THEN TWICE DAILY FOR 4 DAYS AS DIRECTED AFTER YAG, Disp: , Rfl:   •  rosuvastatin (CRESTOR) 10 MG tablet, Take 1 tablet by mouth Every Night., Disp: 90 tablet, Rfl: 0     Physical exam       Alert and orient x3  Well appearing, well developed, in no acute distress   Unlabored respirations    Grossly oriented to person, place and time, judgment is intact, normal mood and affect    Results for orders placed or performed in visit on 09/20/21   Bladder Scan   Result Value Ref Range     Volume 0         Objective     Vital Signs:   There were no vitals taken for this visit.             Assessment and Plan    Diagnoses and all orders for this visit:    1. Recurrent urinary tract infection (Primary)    2. Hydronephrosis, unspecified hydronephrosis type      Recurrent urinary tract infection    Continue vaginal Estrace cream, doing well at this time      Right Hydronephrosis, left atrophic kidney    Left-sided atrophic kidney.  Did discuss risk of sepsis in the future from an atrophic/nonfunctioning kidney.  Because it is not hydronephrotic, she is 76 and she has had no history of pyelonephritis after discussion risk /benefits patient has decided not to consider nephrectomy.  Risk and benefits were discussed.  Patient accepts risk of not removing this kidney and at 76 we did discuss this is reasonable.  No surgery at this time.    Reviewed  renal scan with patient today.  No signs of obstruction on the right side.  She does has parapelvic cysts of the right kidney.  Patient given reassurance    No follow-up on this as needed

## 2021-11-21 ENCOUNTER — PATIENT MESSAGE (OUTPATIENT)
Dept: FAMILY MEDICINE CLINIC | Age: 76
End: 2021-11-21

## 2021-11-22 ENCOUNTER — LAB (OUTPATIENT)
Dept: LAB | Facility: HOSPITAL | Age: 76
End: 2021-11-22

## 2021-11-22 ENCOUNTER — TRANSCRIBE ORDERS (OUTPATIENT)
Dept: ADMINISTRATIVE | Facility: HOSPITAL | Age: 76
End: 2021-11-22

## 2021-11-22 ENCOUNTER — OFFICE VISIT (OUTPATIENT)
Dept: UROLOGY | Facility: CLINIC | Age: 76
End: 2021-11-22

## 2021-11-22 VITALS — HEIGHT: 62 IN | BODY MASS INDEX: 24.11 KG/M2 | WEIGHT: 131 LBS

## 2021-11-22 DIAGNOSIS — N28.1 RENAL CYST: ICD-10-CM

## 2021-11-22 DIAGNOSIS — N13.30 HYDRONEPHROSIS, UNSPECIFIED HYDRONEPHROSIS TYPE: ICD-10-CM

## 2021-11-22 DIAGNOSIS — N39.0 URINARY TRACT INFECTION WITHOUT HEMATURIA, SITE UNSPECIFIED: ICD-10-CM

## 2021-11-22 DIAGNOSIS — E11.9 DIABETES MELLITUS WITHOUT COMPLICATION (HCC): ICD-10-CM

## 2021-11-22 DIAGNOSIS — N39.0 RECURRENT URINARY TRACT INFECTION: Primary | ICD-10-CM

## 2021-11-22 DIAGNOSIS — E11.9 DIABETES MELLITUS WITHOUT COMPLICATION (HCC): Primary | ICD-10-CM

## 2021-11-22 LAB
ALBUMIN SERPL-MCNC: 4.3 G/DL (ref 3.5–5.2)
ALBUMIN UR-MCNC: 1.2 MG/DL
ALBUMIN/GLOB SERPL: 1.7 G/DL
ALP SERPL-CCNC: 82 U/L (ref 39–117)
ALT SERPL W P-5'-P-CCNC: 14 U/L (ref 1–33)
ANION GAP SERPL CALCULATED.3IONS-SCNC: 6.2 MMOL/L (ref 5–15)
AST SERPL-CCNC: 21 U/L (ref 1–32)
BACTERIA UR QL AUTO: ABNORMAL /HPF
BASOPHILS # BLD AUTO: 0.03 10*3/MM3 (ref 0–0.2)
BASOPHILS NFR BLD AUTO: 0.5 % (ref 0–1.5)
BILIRUB SERPL-MCNC: 0.4 MG/DL (ref 0–1.2)
BILIRUB UR QL STRIP: NEGATIVE
BUN SERPL-MCNC: 17 MG/DL (ref 8–23)
BUN/CREAT SERPL: 15.6 (ref 7–25)
CALCIUM SPEC-SCNC: 9.6 MG/DL (ref 8.6–10.5)
CHLORIDE SERPL-SCNC: 102 MMOL/L (ref 98–107)
CLARITY UR: CLEAR
CO2 SERPL-SCNC: 29.8 MMOL/L (ref 22–29)
COLOR UR: YELLOW
CREAT SERPL-MCNC: 1.09 MG/DL (ref 0.57–1)
CREAT UR-MCNC: 145.7 MG/DL
CREAT UR-MCNC: 166.1 MG/DL
DEPRECATED RDW RBC AUTO: 46.1 FL (ref 37–54)
EOSINOPHIL # BLD AUTO: 0.18 10*3/MM3 (ref 0–0.4)
EOSINOPHIL NFR BLD AUTO: 2.8 % (ref 0.3–6.2)
ERYTHROCYTE [DISTWIDTH] IN BLOOD BY AUTOMATED COUNT: 13.6 % (ref 12.3–15.4)
GFR SERPL CREATININE-BSD FRML MDRD: 49 ML/MIN/1.73
GLOBULIN UR ELPH-MCNC: 2.6 GM/DL
GLUCOSE SERPL-MCNC: 132 MG/DL (ref 65–99)
GLUCOSE UR STRIP-MCNC: NEGATIVE MG/DL
HBA1C MFR BLD: 6.7 % (ref 4.8–5.6)
HCT VFR BLD AUTO: 42.7 % (ref 34–46.6)
HGB BLD-MCNC: 13.3 G/DL (ref 12–15.9)
HGB UR QL STRIP.AUTO: ABNORMAL
HYALINE CASTS UR QL AUTO: ABNORMAL /LPF
IMM GRANULOCYTES # BLD AUTO: 0.03 10*3/MM3 (ref 0–0.05)
IMM GRANULOCYTES NFR BLD AUTO: 0.5 % (ref 0–0.5)
KETONES UR QL STRIP: NEGATIVE
LEUKOCYTE ESTERASE UR QL STRIP.AUTO: NEGATIVE
LYMPHOCYTES # BLD AUTO: 1.36 10*3/MM3 (ref 0.7–3.1)
LYMPHOCYTES NFR BLD AUTO: 21.1 % (ref 19.6–45.3)
MCH RBC QN AUTO: 28.3 PG (ref 26.6–33)
MCHC RBC AUTO-ENTMCNC: 31.1 G/DL (ref 31.5–35.7)
MCV RBC AUTO: 90.9 FL (ref 79–97)
MICROALBUMIN/CREAT UR: 8.2 MG/G
MONOCYTES # BLD AUTO: 0.71 10*3/MM3 (ref 0.1–0.9)
MONOCYTES NFR BLD AUTO: 11 % (ref 5–12)
NEUTROPHILS NFR BLD AUTO: 4.14 10*3/MM3 (ref 1.7–7)
NEUTROPHILS NFR BLD AUTO: 64.1 % (ref 42.7–76)
NITRITE UR QL STRIP: NEGATIVE
PH UR STRIP.AUTO: 5.5 [PH] (ref 5–8)
PLATELET # BLD AUTO: 249 10*3/MM3 (ref 140–450)
PMV BLD AUTO: 10 FL (ref 6–12)
POTASSIUM SERPL-SCNC: 4.5 MMOL/L (ref 3.5–5.2)
PROT ?TM UR-MCNC: 14.8 MG/DL
PROT SERPL-MCNC: 6.9 G/DL (ref 6–8.5)
PROT UR QL STRIP: NEGATIVE
PROT/CREAT UR: 0.1 MG/G{CREAT}
RBC # BLD AUTO: 4.7 10*6/MM3 (ref 3.77–5.28)
RBC # UR STRIP: ABNORMAL /HPF
REF LAB TEST METHOD: ABNORMAL
SODIUM SERPL-SCNC: 138 MMOL/L (ref 136–145)
SP GR UR STRIP: 1.02 (ref 1–1.03)
SQUAMOUS #/AREA URNS HPF: ABNORMAL /HPF
UROBILINOGEN UR QL STRIP: ABNORMAL
WBC # UR STRIP: ABNORMAL /HPF
WBC NRBC COR # BLD: 6.45 10*3/MM3 (ref 3.4–10.8)

## 2021-11-22 PROCEDURE — 80053 COMPREHEN METABOLIC PANEL: CPT

## 2021-11-22 PROCEDURE — 84156 ASSAY OF PROTEIN URINE: CPT

## 2021-11-22 PROCEDURE — 85025 COMPLETE CBC W/AUTO DIFF WBC: CPT

## 2021-11-22 PROCEDURE — 82043 UR ALBUMIN QUANTITATIVE: CPT

## 2021-11-22 PROCEDURE — 82570 ASSAY OF URINE CREATININE: CPT

## 2021-11-22 PROCEDURE — 81001 URINALYSIS AUTO W/SCOPE: CPT

## 2021-11-22 PROCEDURE — 83036 HEMOGLOBIN GLYCOSYLATED A1C: CPT

## 2021-11-22 PROCEDURE — 36415 COLL VENOUS BLD VENIPUNCTURE: CPT

## 2021-11-22 PROCEDURE — 99212 OFFICE O/P EST SF 10 MIN: CPT | Performed by: UROLOGY

## 2021-12-03 ENCOUNTER — OFFICE VISIT (OUTPATIENT)
Dept: DIABETES SERVICES | Facility: HOSPITAL | Age: 76
End: 2021-12-03

## 2021-12-03 VITALS
HEART RATE: 76 BPM | BODY MASS INDEX: 24.18 KG/M2 | RESPIRATION RATE: 22 BRPM | SYSTOLIC BLOOD PRESSURE: 129 MMHG | WEIGHT: 131.39 LBS | DIASTOLIC BLOOD PRESSURE: 78 MMHG | HEIGHT: 62 IN | TEMPERATURE: 98.1 F | OXYGEN SATURATION: 95 %

## 2021-12-03 DIAGNOSIS — E11.22 CONTROLLED TYPE 2 DIABETES MELLITUS WITH STAGE 3 CHRONIC KIDNEY DISEASE, WITHOUT LONG-TERM CURRENT USE OF INSULIN (HCC): Primary | ICD-10-CM

## 2021-12-03 DIAGNOSIS — N18.30 CONTROLLED TYPE 2 DIABETES MELLITUS WITH STAGE 3 CHRONIC KIDNEY DISEASE, WITHOUT LONG-TERM CURRENT USE OF INSULIN (HCC): Primary | ICD-10-CM

## 2021-12-03 PROCEDURE — G0463 HOSPITAL OUTPT CLINIC VISIT: HCPCS | Performed by: NURSE PRACTITIONER

## 2021-12-03 PROCEDURE — 99213 OFFICE O/P EST LOW 20 MIN: CPT | Performed by: NURSE PRACTITIONER

## 2021-12-03 NOTE — PROGRESS NOTES
Chief Complaint  Diabetes (blood sugars are higher because she is off metformin, has record of her blood sugars for you to look at, pain in her stomach went away after stopping her metformin )    Referred By: Mata Love,*    Subjective     {Problem List  Visit Diagnosis   Encounters  Notes  Medications  Labs  Result Review Imaging  Media :23}     Dacia Napier presents to [facility] for diabetes medication management    History of Present Illness    Visit type:  follow-up  Diabetes type:  Type 2  Current diabetes status/concerns/issues: She reports that her GI symptoms were almost immediately relieved after coming off of the Metformin that was discontinued at her last appointment  Other health concerns: None reported other than recent URI  Diabetes symptoms:    Polyuria: No   Polydipsia: No   Polyphagia: No   Blurred vision: No   Excessive fatigue: No  Diabetes complications:  Neuropathy:No  Nephropathy:Yes, stage III renal disease  Retinopathy:No  Amputation/Wounds:No  Gastroparesis:No  Cardiovascular Disease:Yes, Hypercholesterolemia  Erectile Dysfunction:N/A  Hypoglycemia:  None reported at this time  Hypoglycemia Symptoms:  No hypoglycemia at this time  Current diabetes treatment: She is currently managing her diabetes through diet and exercise alone without medication  Blood glucose device:  Meter  Blood glucose monitoring frequency:  2  Blood glucose range/average: Fasting blood glucose levels are staying below 125 on most occasions.  Random glucose levels in the evening are below 140 on most occasions with a rare outlier due to dietary intake.  Diet:  Avoids high carb/sweet foods, Diet drinks only  Activity/Exercise:  She is doing some walking although she admits over the last month or so she has been less active due to having upper respiratory infection and other issues.    Past Medical History:   Diagnosis Date   • Colon polyp    • Diabetes mellitus (HCC)     • GERD (gastroesophageal reflux disease)    • Hiatal hernia    • Hyperlipidemia    • Rapid or irregular heartbeat    • Seasonal allergies    • Type 2 diabetes mellitus (HCC)      Past Surgical History:   Procedure Laterality Date   • CATARACT EXTRACTION, BILATERAL     • COLONOSCOPY     • COLONOSCOPY N/A 6/25/2021    Procedure: COLONOSCOPY;  Surgeon: Ty Partida MD;  Location: [facility];  Service: General;  Laterality: N/A;  Colon polyp   • ENDOSCOPY N/A 6/25/2021    Procedure: ESOPHAGOGASTRODUODENOSCOPY;  Surgeon: Ty Partida MD;  Location: [facility] ENDOSCOPY;  Service: General;  Laterality: N/A;  Hiatal Hernia   • EYE CAPSULOTOMY WITH LASER     • HERNIA REPAIR     • HYSTERECTOMY     • LASIK     • TONSILLECTOMY       Family History   Problem Relation Age of Onset   • Heart disease Mother    • Heart disease Father    • Heart disease Sister    • Heart disease Brother    • Malig Hyperthermia Neg Hx      Social History     Socioeconomic History   • Marital status:    Tobacco Use   • Smoking status: Never Smoker   • Smokeless tobacco: Never Used   Vaping Use   • Vaping Use: Never used   Substance and Sexual Activity   • Alcohol use: Never   • Drug use: Never   • Sexual activity: Defer     No Known Allergies    Current Outpatient Medications:   •  aspirin (aspirin) 81 MG EC tablet, , Disp: , Rfl:   •  Blood Glucose Monitoring Suppl (Accu-Chek Guide) w/Device kit, USE TO TEST BLOOD SUGAR, Disp: , Rfl:   •  Blood Glucose Monitoring Suppl device, 2 each 2 (two) times a day., Disp: 1 each, Rfl: 0  •  Calcium Carbonate (Calcium 600) 1500 (600 Ca) MG tablet, 2 (Two) Times a Day With Meals., Disp: , Rfl:   •  cetirizine (zyrTEC) 10 MG tablet, Take 10 mg by mouth Daily As Needed., Disp: , Rfl:   •  Cholecalciferol (Vitamin D3) 50 MCG (2000 UT) tablet, Take 2,000 Units by mouth Daily., Disp: , Rfl:   •  denosumab (XGEVA) 120 MG/1.7ML solution injection, Inject  under the skin into the appropriate area as  directed 1 (One) Time., Disp: , Rfl:   •  estradiol (ESTRACE) 0.1 MG/GM vaginal cream, INSERT 2 GRAMS VAGINALLY 2 TIMES A WEEK, Disp: , Rfl:   •  ezetimibe (ZETIA) 10 MG tablet, Take 10 mg by mouth Daily., Disp: , Rfl:   •  FeroSul 325 (65 Fe) MG tablet, TAKE 1 TABLET BY MOUTH 2 TIMES DAILY (Patient taking differently: Take 325 mg by mouth Every Other Day.), Disp: 60 tablet, Rfl: 3  •  FLUTICASONE PROPIONATE, NASAL, NA, 1 spray into the nostril(s) as directed by provider Daily As Needed., Disp: , Rfl:   •  Glucose Blood (ACCU-CHEK GUIDE VI), by In Vitro route., Disp: , Rfl:   •  glucose blood test strip, 1 each by Other route 2 (Two) Times a Day. Check blood sugar bid, Disp: 100 each, Rfl: 11  •  Lancets (onetouch ultrasoft) lancets, 1 each by Other route 2 (Two) Times a Day. Use as instructed, Disp: 100 each, Rfl: 11  •  Omega-3 1000 MG capsule, Take 1,000 mg by mouth Daily., Disp: , Rfl:   •  omeprazole (priLOSEC) 20 MG capsule, TAKE 1 CAPSULE BY MOUTH TWICE DAILY. DO NOT CRUSH CAPSULE, Disp: , Rfl:   •  phenazopyridine (PYRIDIUM) 100 MG tablet, TAKE 1 TABLET BY MOUTH THREE TIMES DAILY FOR 3 DAYS AS NEEDED, Disp: , Rfl:   •  prednisoLONE acetate (PRED FORTE) 1 % ophthalmic suspension, INSTILL 1 DROP INTO AFFECTED EYE FOUR TIMES DAILY FOR 4 DAYS THEN TWICE DAILY FOR 4 DAYS AS DIRECTED AFTER YAG, Disp: , Rfl:   •  rosuvastatin (CRESTOR) 10 MG tablet, Take 1 tablet by mouth Every Night., Disp: 90 tablet, Rfl: 0    Review of Systems   Constitutional: Positive for fatigue. Negative for activity change, appetite change, fever, unexpected weight gain and unexpected weight loss.   HENT: Negative for congestion, ear pain, facial swelling, hearing loss, sore throat and tinnitus.    Eyes: Negative for blurred vision, double vision, redness and visual disturbance.   Respiratory: Negative for cough, shortness of breath and wheezing.    Cardiovascular: Negative for chest pain, palpitations and leg swelling.   Gastrointestinal:  "Negative for abdominal distention, constipation, diarrhea, nausea, vomiting, GERD and indigestion.   Endocrine: Negative for polydipsia, polyphagia and polyuria.   Genitourinary: Negative for difficulty urinating, frequency and urgency.   Musculoskeletal: Positive for arthralgias and back pain. Negative for gait problem and myalgias.   Skin: Negative for rash, skin lesions and bruise.   Neurological: Negative for seizures, speech difficulty, weakness, headache and confusion.   Psychiatric/Behavioral: Negative for sleep disturbance, depressed mood and stress. The patient is not nervous/anxious.         Objective     Vitals:    12/03/21 1105   BP: 129/78   BP Location: Left arm   Patient Position: Sitting   Cuff Size: Adult   Pulse: 76   Resp: 22   Temp: 98.1 °F (36.7 °C)   SpO2: 95%   Weight: 59.6 kg (131 lb 6.3 oz)   Height: 157.5 cm (62\")   PainSc: 0-No pain     Body mass index is 24.03 kg/m².    Physical Exam  Constitutional:       Appearance: Normal appearance. She is normal weight.   HENT:      Head: Normocephalic and atraumatic.      Right Ear: External ear normal.      Left Ear: External ear normal.      Nose: Nose normal.   Eyes:      Extraocular Movements: Extraocular movements intact.      Conjunctiva/sclera: Conjunctivae normal.   Pulmonary:      Effort: Pulmonary effort is normal.   Musculoskeletal:         General: Normal range of motion.      Cervical back: Normal range of motion.   Skin:     General: Skin is warm and dry.   Neurological:      General: No focal deficit present.      Mental Status: She is alert and oriented to person, place, and time. Mental status is at baseline.   Psychiatric:         Mood and Affect: Mood normal.         Behavior: Behavior normal.         Thought Content: Thought content normal.         Judgment: Judgment normal.         Result Review :   The following data was reviewed by: JANES Betancourt on 12/03/2021:    Her most recent A1c collected on 11/22/2021 was " 6.7% indicating controlled type 2 diabetes.  This is up minimally from the prior result of 6.3 collected in September of this year.    Most Recent A1C    HGBA1C Most Recent 11/22/21   Hemoglobin A1C 6.70 (A)   (A) Abnormal value              A1C Last 3 Results    HGBA1C Last 3 Results 4/16/21 9/1/21 11/22/21   Hemoglobin A1C 6.0 (A) 6.30 (A) 6.70 (A)   (A) Abnormal value       Comments are available for some flowsheets but are not being displayed.                 Creatinine   Date Value Ref Range Status   11/22/2021 1.09 (H) 0.57 - 1.00 mg/dL Final   09/01/2021 0.92 0.57 - 1.00 mg/dL Final       eGFR   Amer   Date Value Ref Range Status   06/11/2021 72 >60 mL/min/1.73 Final     eGFR Non  Amer   Date Value Ref Range Status   11/22/2021 49 (L) >60 mL/min/1.73 Final   09/01/2021 60 (L) >60 mL/min/1.73 Final       Microalbumin, Urine   Date Value Ref Range Status   11/22/2021 1.2 mg/dL Final   09/01/2021 <1.2 mg/dL Final       Labs collected on 11/22/2021 show increase in creatinine and decreased GFR indicating stage III renal disease.          Assessment: The patient's A1c has remained controlled with the discontinuing of the Metformin.  This is also alleviated her GI distress.      Diagnoses and all orders for this visit:    1. Controlled type 2 diabetes mellitus with stage 3 chronic kidney disease, without long-term current use of insulin (HCC) (Primary)        Plan: We will make no changes to her current treatment plan.  We will consider oral medication if the A1c goes up at the next evaluation.  We will schedule her for a follow-up in 3 months.    The patient will monitor her blood glucose levels 1-2 times a day.  If she develops problematic hyperglycemia or hypoglycemia or adverse drug reaction, she will contact the office for further instructions.        Follow Up     No follow-ups on file.    Patient was given instructions and counseling regarding her condition or for health maintenance advice.  Please see specific information pulled into the AVS if appropriate.     Annabelle Buckley, APRN  12/03/2021

## 2021-12-17 DIAGNOSIS — Z11.59 SCREENING FOR VIRAL DISEASE: Primary | ICD-10-CM

## 2021-12-22 RX ORDER — ESTRADIOL 0.1 MG/G
CREAM VAGINAL
Qty: 42.5 G | Refills: 3 | Status: SHIPPED | OUTPATIENT
Start: 2021-12-22 | End: 2022-11-01 | Stop reason: SDUPTHER

## 2021-12-23 ENCOUNTER — LAB (OUTPATIENT)
Dept: LAB | Facility: HOSPITAL | Age: 76
End: 2021-12-23

## 2021-12-23 DIAGNOSIS — Z11.59 SCREENING FOR VIRAL DISEASE: ICD-10-CM

## 2021-12-23 PROCEDURE — 36415 COLL VENOUS BLD VENIPUNCTURE: CPT

## 2021-12-23 PROCEDURE — 86769 SARS-COV-2 COVID-19 ANTIBODY: CPT

## 2021-12-28 LAB
SARS-COV-2 AB SERPL IA-ACNC: 98.6 U/ML
SARS-COV-2 AB SERPL-IMP: POSITIVE

## 2021-12-29 ENCOUNTER — DOCUMENTATION (OUTPATIENT)
Dept: FAMILY MEDICINE CLINIC | Age: 76
End: 2021-12-29

## 2021-12-29 NOTE — PROGRESS NOTES
Pt is due for Prolia injection on 1-7-2022. LOV 9-1-2021, last calcium 11- at 9.6 and last DEXA 5-, ok to proceed with ordering prolia? kh

## 2022-01-06 ENCOUNTER — TELEMEDICINE (OUTPATIENT)
Dept: FAMILY MEDICINE CLINIC | Age: 77
End: 2022-01-06

## 2022-01-06 DIAGNOSIS — K44.9 HIATAL HERNIA WITH GERD WITHOUT ESOPHAGITIS: ICD-10-CM

## 2022-01-06 DIAGNOSIS — E11.9 TYPE 2 DIABETES MELLITUS WITHOUT COMPLICATION, WITHOUT LONG-TERM CURRENT USE OF INSULIN: Primary | ICD-10-CM

## 2022-01-06 DIAGNOSIS — Q60.0 AGENESIS OF RIGHT KIDNEY: ICD-10-CM

## 2022-01-06 DIAGNOSIS — E78.00 HIGH CHOLESTEROL: ICD-10-CM

## 2022-01-06 DIAGNOSIS — K21.9 HIATAL HERNIA WITH GERD WITHOUT ESOPHAGITIS: ICD-10-CM

## 2022-01-06 PROBLEM — I47.9 PAROXYSMAL TACHYCARDIA: Status: RESOLVED | Noted: 2020-02-10 | Resolved: 2022-01-06

## 2022-01-06 PROBLEM — N39.0 RECURRENT URINARY TRACT INFECTION: Status: RESOLVED | Noted: 2021-09-16 | Resolved: 2022-01-06

## 2022-01-06 PROBLEM — N13.30 HYDRONEPHROSIS: Status: RESOLVED | Noted: 2021-09-16 | Resolved: 2022-01-06

## 2022-01-06 PROBLEM — J30.89 OTHER ALLERGIC RHINITIS: Status: ACTIVE | Noted: 2022-01-06

## 2022-01-06 PROCEDURE — 99214 OFFICE O/P EST MOD 30 MIN: CPT | Performed by: FAMILY MEDICINE

## 2022-01-06 RX ORDER — OMEPRAZOLE 10 MG/1
10 CAPSULE, DELAYED RELEASE ORAL 2 TIMES DAILY
COMMUNITY
End: 2022-01-06 | Stop reason: SDUPTHER

## 2022-01-06 RX ORDER — OMEPRAZOLE 10 MG/1
10 CAPSULE, DELAYED RELEASE ORAL 2 TIMES DAILY
Qty: 90 CAPSULE | Refills: 1 | Status: SHIPPED | OUTPATIENT
Start: 2022-01-06 | End: 2022-04-06

## 2022-01-06 RX ORDER — ROSUVASTATIN CALCIUM 10 MG/1
10 TABLET, COATED ORAL NIGHTLY
Qty: 90 TABLET | Refills: 1 | Status: SHIPPED | OUTPATIENT
Start: 2022-01-06 | End: 2022-07-18 | Stop reason: SDUPTHER

## 2022-01-06 RX ORDER — EZETIMIBE 10 MG/1
10 TABLET ORAL DAILY
Qty: 90 TABLET | Refills: 1 | Status: SHIPPED | OUTPATIENT
Start: 2022-01-06 | End: 2022-07-18 | Stop reason: SDUPTHER

## 2022-01-06 NOTE — PROGRESS NOTES
Mode of Visit: Video  You have chosen to receive care through a telehealth visit.  Do you consent to use a video/audio connection for your medical care today? Yes   The visit included audio and video interaction. No technical issues occurred during this visit.    Subjective       Chief Complaint   Patient presents with   • Diabetes     dox video [phone]         HPI:     F/U OF CHRONIC HEALTH PROBLEMS    Dacia Napier is a 76 y.o. female who presents to  [facility] today     Review of Systems   Constitutional: Negative for activity change, appetite change, chills, fatigue and fever.   HENT: Negative for congestion, ear pain, hearing loss, rhinorrhea and sore throat.    Eyes: Negative for discharge and visual disturbance.   Respiratory: Negative for cough and shortness of breath.    Cardiovascular: Negative for chest pain, palpitations and leg swelling.   Gastrointestinal: Negative for abdominal pain, diarrhea, nausea and vomiting.   Musculoskeletal: Negative for arthralgias and myalgias.   Psychiatric/Behavioral: Negative for dysphoric mood.            PE:   Objective   There were no vitals taken for this visit.    There is no height or weight on file to calculate BMI.    Physical Exam   Constitutional: She appears well-developed and well-nourished.   HENT:   Head: Normocephalic.   Pulmonary/Chest: Effort normal.   Neurological: She is alert.   Psychiatric: She has a normal mood and affect.                             A/P:     Assessment/Plan   Diagnoses and all orders for this visit:    1. Type 2 diabetes mellitus without complication, without long-term current use of insulin (HCC) (Primary)  Assessment & Plan:  Diabetes is improving with treatment.   Continue current treatment regimen.  Diabetes will be reassessed in 3 months   WILL REPEAT LABS AND ADVISE HER TO F/U WITH [facility] AS PLANNED.  .    Orders:  -     Comprehensive Metabolic Panel;  Future  -     Hemoglobin A1c; Future  -     Lipid Panel; Future    2. Hiatal hernia with GERD without esophagitis  Assessment & Plan:  IMPROVED WITH CURRENT TREATMENT, CONTINUE SAME, WILL REEVALUATE AT NEXT VISIT       3. High cholesterol  Assessment & Plan:  Lipid abnormalities are improving with treatment.  Nutritional counseling was provided.  Lipids will be reassessed in 6 months.      4. Agenesis of right kidney  Assessment & Plan:  Renal condition is unchanged.  Continue current treatment regimen.  Renal condition will be reassessed in 6 months   UROLOGY NOTES REVIEWED.      Other orders  -     rosuvastatin (CRESTOR) 10 MG tablet; Take 1 tablet by mouth Every Night.  Dispense: 90 tablet; Refill: 1  -     omeprazole (prilOSEC) 10 MG capsule; Take 1 capsule by mouth 2 (Two) Times a Day.  Dispense: 90 capsule; Refill: 1  -     ezetimibe (ZETIA) 10 MG tablet; Take 1 tablet by mouth Daily.  Dispense: 90 tablet; Refill: 1            Follow up:    Return in about 6 months (around 7/6/2022).     Answers for HPI/ROS submitted by the patient on 1/6/2022  What is the primary reason for your visit?: Physical

## 2022-01-06 NOTE — ASSESSMENT & PLAN NOTE
Diabetes is improving with treatment.   Continue current treatment regimen.  Diabetes will be reassessed in 3 months   WILL REPEAT LABS AND ADVISE HER TO F/U WITH [facility] AS PLANNED.  .

## 2022-01-06 NOTE — ASSESSMENT & PLAN NOTE
Renal condition is unchanged.  Continue current treatment regimen.  Renal condition will be reassessed in 6 months   UROLOGY NOTES REVIEWED.

## 2022-01-10 ENCOUNTER — PATIENT MESSAGE (OUTPATIENT)
Dept: UROLOGY | Facility: CLINIC | Age: 77
End: 2022-01-10

## 2022-01-10 ENCOUNTER — TELEPHONE (OUTPATIENT)
Dept: UROLOGY | Facility: CLINIC | Age: 77
End: 2022-01-10

## 2022-01-10 ENCOUNTER — LAB (OUTPATIENT)
Dept: LAB | Facility: HOSPITAL | Age: 77
End: 2022-01-10

## 2022-01-10 DIAGNOSIS — N39.0 RECURRENT URINARY TRACT INFECTION: Primary | ICD-10-CM

## 2022-01-10 PROCEDURE — 87086 URINE CULTURE/COLONY COUNT: CPT | Performed by: UROLOGY

## 2022-01-10 NOTE — TELEPHONE ENCOUNTER
Spoke to patient and let her know order was in for sample and she could take it to  in [place] and give sample for culture. She confirmed.

## 2022-01-11 DIAGNOSIS — R30.0 DYSURIA: Primary | ICD-10-CM

## 2022-01-12 LAB — BACTERIA SPEC AEROBE CULT: NO GROWTH

## 2022-01-20 ENCOUNTER — TELEMEDICINE (OUTPATIENT)
Dept: FAMILY MEDICINE CLINIC | Age: 77
End: 2022-01-20

## 2022-01-20 DIAGNOSIS — R05.9 COUGH: Primary | ICD-10-CM

## 2022-01-20 DIAGNOSIS — R79.0 ABNORMAL BLOOD LEVEL OF IRON: Primary | ICD-10-CM

## 2022-01-20 PROCEDURE — 99213 OFFICE O/P EST LOW 20 MIN: CPT | Performed by: FAMILY MEDICINE

## 2022-01-20 RX ORDER — FLUTICASONE PROPIONATE 50 MCG
2 SPRAY, SUSPENSION (ML) NASAL DAILY
Qty: 16 G | Refills: 2 | Status: SHIPPED | OUTPATIENT
Start: 2022-01-20 | End: 2022-04-19

## 2022-01-20 RX ORDER — AMOXICILLIN 500 MG/1
500 CAPSULE ORAL 3 TIMES DAILY
Qty: 30 CAPSULE | Refills: 0 | Status: SHIPPED | OUTPATIENT
Start: 2022-01-20 | End: 2022-03-11

## 2022-01-20 NOTE — ASSESSMENT & PLAN NOTE
POSSIBLE ACUTE BRONCHITIS.  WILL COVER AS NOTED.  ADVISE FLUIDS, REST, OTC MEDS PRN.  MAY NEED COVID TESTING IF SYMPTOMS PERSIST OR WORSEN.

## 2022-01-20 NOTE — PROGRESS NOTES
Mode of Visit: Video  PRESENT:  JOSE NAPIER IN HER HOME, GULSHAN RAMOS MD AT THE OFFICE   You have chosen to receive care through a telehealth visit.  Do you consent to use a video/audio connection for your medical care today? Yes   The visit included audio and video interaction. No technical issues occurred during this visit.    Subjective       Chief Complaint   Patient presents with   • [employer] Video Call     [phone]   • Cough     Pt c/o congestion, cough ongoing about 3 days.         HPI:   --RUNNY NOSE AND LOOSE COUGH FOR THREE DAYS.  FULLY VACCINATED AGAINST COVID.  NO KNOWN COVID EXPOSURE.       Jose Napier is a 76 y.o. female who presents to  [facility] today     Review of Systems   Constitutional: Negative for activity change, appetite change, chills, fatigue and fever.   HENT: Positive for congestion and rhinorrhea. Negative for ear pain and sore throat.    Eyes: Negative for discharge.   Respiratory: Negative for shortness of breath.    Gastrointestinal: Negative for abdominal pain, diarrhea, nausea and vomiting.   Musculoskeletal: Negative for arthralgias and myalgias.            PE:   Objective   There were no vitals taken for this visit.    There is no height or weight on file to calculate BMI.    Physical Exam   Constitutional: She appears well-developed and well-nourished.   HENT:   Head: Normocephalic.   Pulmonary/Chest: Effort normal.   Neurological: She is alert.   Psychiatric: She has a normal mood and affect.                             A/P:     Assessment/Plan   Diagnoses and all orders for this visit:    1. Cough (Primary)  Assessment & Plan:  POSSIBLE ACUTE BRONCHITIS.  WILL COVER AS NOTED.  ADVISE FLUIDS, REST, OTC MEDS PRN.  MAY NEED COVID TESTING IF SYMPTOMS PERSIST OR WORSEN.        Other orders  -     fluticasone (Flonase) 50 MCG/ACT nasal spray; 2 sprays into the nostril(s) as directed by provider Daily.   Dispense: 16 g; Refill: 2  -     amoxicillin (AMOXIL) 500 MG capsule; Take 1 capsule by mouth 3 (Three) Times a Day.  Dispense: 30 capsule; Refill: 0            Follow up:    Return if symptoms worsen or fail to improve.

## 2022-01-22 ENCOUNTER — PATIENT MESSAGE (OUTPATIENT)
Dept: FAMILY MEDICINE CLINIC | Age: 77
End: 2022-01-22

## 2022-01-26 ENCOUNTER — CLINICAL SUPPORT (OUTPATIENT)
Dept: FAMILY MEDICINE CLINIC | Age: 77
End: 2022-01-26

## 2022-01-26 DIAGNOSIS — M81.0 POSTMENOPAUSAL OSTEOPOROSIS: Primary | ICD-10-CM

## 2022-01-26 PROCEDURE — 96372 THER/PROPH/DIAG INJ SC/IM: CPT | Performed by: FAMILY MEDICINE

## 2022-01-27 ENCOUNTER — PATIENT MESSAGE (OUTPATIENT)
Dept: FAMILY MEDICINE CLINIC | Age: 77
End: 2022-01-27

## 2022-01-28 DIAGNOSIS — B37.31 VAGINAL YEAST INFECTION: Primary | ICD-10-CM

## 2022-01-28 DIAGNOSIS — Z12.31 ENCOUNTER FOR SCREENING MAMMOGRAM FOR MALIGNANT NEOPLASM OF BREAST: ICD-10-CM

## 2022-01-28 RX ORDER — FLUCONAZOLE 150 MG/1
150 TABLET ORAL ONCE
Qty: 1 TABLET | Refills: 1 | Status: SHIPPED | OUTPATIENT
Start: 2022-01-28 | End: 2022-01-28

## 2022-02-16 DIAGNOSIS — M81.0 POSTMENOPAUSAL OSTEOPOROSIS: Primary | ICD-10-CM

## 2022-02-21 ENCOUNTER — TRANSCRIBE ORDERS (OUTPATIENT)
Dept: ADMINISTRATIVE | Facility: HOSPITAL | Age: 77
End: 2022-02-21

## 2022-02-21 ENCOUNTER — LAB (OUTPATIENT)
Dept: LAB | Facility: HOSPITAL | Age: 77
End: 2022-02-21

## 2022-02-21 DIAGNOSIS — E55.9 VITAMIN D DEFICIENCY: ICD-10-CM

## 2022-02-21 DIAGNOSIS — I10 ESSENTIAL HYPERTENSION, MALIGNANT: ICD-10-CM

## 2022-02-21 DIAGNOSIS — N18.30 STAGE 3 CHRONIC KIDNEY DISEASE, UNSPECIFIED WHETHER STAGE 3A OR 3B CKD: Primary | ICD-10-CM

## 2022-02-21 DIAGNOSIS — R79.0 ABNORMAL BLOOD LEVEL OF IRON: ICD-10-CM

## 2022-02-21 DIAGNOSIS — E11.9 DIABETES MELLITUS WITHOUT COMPLICATION: ICD-10-CM

## 2022-02-21 DIAGNOSIS — N18.30 STAGE 3 CHRONIC KIDNEY DISEASE, UNSPECIFIED WHETHER STAGE 3A OR 3B CKD: ICD-10-CM

## 2022-02-21 DIAGNOSIS — E11.9 TYPE 2 DIABETES MELLITUS WITHOUT COMPLICATION, WITHOUT LONG-TERM CURRENT USE OF INSULIN: ICD-10-CM

## 2022-02-21 LAB
25(OH)D3 SERPL-MCNC: 42.7 NG/ML (ref 30–100)
ALBUMIN SERPL-MCNC: 4.2 G/DL (ref 3.5–5.2)
ALBUMIN/GLOB SERPL: 1.4 G/DL
ALP SERPL-CCNC: 88 U/L (ref 39–117)
ALT SERPL W P-5'-P-CCNC: 15 U/L (ref 1–33)
ANION GAP SERPL CALCULATED.3IONS-SCNC: 12.9 MMOL/L (ref 5–15)
AST SERPL-CCNC: 21 U/L (ref 1–32)
BACTERIA UR QL AUTO: ABNORMAL /HPF
BASOPHILS # BLD AUTO: 0.03 10*3/MM3 (ref 0–0.2)
BASOPHILS NFR BLD AUTO: 0.5 % (ref 0–1.5)
BILIRUB SERPL-MCNC: 0.6 MG/DL (ref 0–1.2)
BILIRUB UR QL STRIP: NEGATIVE
BUN SERPL-MCNC: 15 MG/DL (ref 8–23)
BUN/CREAT SERPL: 12.5 (ref 7–25)
CALCIUM SPEC-SCNC: 9.6 MG/DL (ref 8.6–10.5)
CHLORIDE SERPL-SCNC: 102 MMOL/L (ref 98–107)
CHOLEST SERPL-MCNC: 149 MG/DL (ref 0–200)
CLARITY UR: CLEAR
CO2 SERPL-SCNC: 25.1 MMOL/L (ref 22–29)
COLOR UR: YELLOW
CREAT SERPL-MCNC: 1.2 MG/DL (ref 0.57–1)
DEPRECATED RDW RBC AUTO: 39 FL (ref 37–54)
EOSINOPHIL # BLD AUTO: 0.21 10*3/MM3 (ref 0–0.4)
EOSINOPHIL NFR BLD AUTO: 3.5 % (ref 0.3–6.2)
ERYTHROCYTE [DISTWIDTH] IN BLOOD BY AUTOMATED COUNT: 12.3 % (ref 12.3–15.4)
GFR SERPL CREATININE-BSD FRML MDRD: 44 ML/MIN/1.73
GLOBULIN UR ELPH-MCNC: 3 GM/DL
GLUCOSE SERPL-MCNC: 139 MG/DL (ref 65–99)
GLUCOSE UR STRIP-MCNC: NEGATIVE MG/DL
HBA1C MFR BLD: 7.1 % (ref 4.8–5.6)
HCT VFR BLD AUTO: 41.8 % (ref 34–46.6)
HDLC SERPL-MCNC: 46 MG/DL (ref 40–60)
HGB BLD-MCNC: 13.7 G/DL (ref 12–15.9)
HGB UR QL STRIP.AUTO: ABNORMAL
IRON 24H UR-MRATE: 84 MCG/DL (ref 37–145)
IRON SATN MFR SERPL: 18 % (ref 20–50)
KETONES UR QL STRIP: NEGATIVE
LDLC SERPL CALC-MCNC: 69 MG/DL (ref 0–100)
LDLC/HDLC SERPL: 1.36 {RATIO}
LEUKOCYTE ESTERASE UR QL STRIP.AUTO: ABNORMAL
LYMPHOCYTES # BLD AUTO: 1.25 10*3/MM3 (ref 0.7–3.1)
LYMPHOCYTES NFR BLD AUTO: 20.8 % (ref 19.6–45.3)
MCH RBC QN AUTO: 29.2 PG (ref 26.6–33)
MCHC RBC AUTO-ENTMCNC: 32.8 G/DL (ref 31.5–35.7)
MCV RBC AUTO: 89.1 FL (ref 79–97)
MONOCYTES # BLD AUTO: 0.56 10*3/MM3 (ref 0.1–0.9)
MONOCYTES NFR BLD AUTO: 9.3 % (ref 5–12)
NEUTROPHILS NFR BLD AUTO: 3.92 10*3/MM3 (ref 1.7–7)
NEUTROPHILS NFR BLD AUTO: 65.2 % (ref 42.7–76)
NITRITE UR QL STRIP: NEGATIVE
PH UR STRIP.AUTO: 6 [PH] (ref 5–8)
PLATELET # BLD AUTO: 266 10*3/MM3 (ref 140–450)
PMV BLD AUTO: 10.4 FL (ref 6–12)
POTASSIUM SERPL-SCNC: 4.6 MMOL/L (ref 3.5–5.2)
PROT SERPL-MCNC: 7.2 G/DL (ref 6–8.5)
PROT UR QL STRIP: NEGATIVE
RBC # BLD AUTO: 4.69 10*6/MM3 (ref 3.77–5.28)
RBC # UR STRIP: ABNORMAL /HPF
REF LAB TEST METHOD: ABNORMAL
SODIUM SERPL-SCNC: 140 MMOL/L (ref 136–145)
SP GR UR STRIP: 1.02 (ref 1–1.03)
SQUAMOUS #/AREA URNS HPF: ABNORMAL /HPF
TIBC SERPL-MCNC: 459 MCG/DL (ref 298–536)
TRANSFERRIN SERPL-MCNC: 308 MG/DL (ref 200–360)
TRIGL SERPL-MCNC: 203 MG/DL (ref 0–150)
UROBILINOGEN UR QL STRIP: ABNORMAL
VLDLC SERPL-MCNC: 34 MG/DL (ref 5–40)
WBC # UR STRIP: ABNORMAL /HPF
WBC NRBC COR # BLD: 6.01 10*3/MM3 (ref 3.4–10.8)

## 2022-02-21 PROCEDURE — 84466 ASSAY OF TRANSFERRIN: CPT

## 2022-02-21 PROCEDURE — 80053 COMPREHEN METABOLIC PANEL: CPT

## 2022-02-21 PROCEDURE — 83540 ASSAY OF IRON: CPT

## 2022-02-21 PROCEDURE — 81001 URINALYSIS AUTO W/SCOPE: CPT

## 2022-02-21 PROCEDURE — 80061 LIPID PANEL: CPT

## 2022-02-21 PROCEDURE — 36415 COLL VENOUS BLD VENIPUNCTURE: CPT

## 2022-02-21 PROCEDURE — 82306 VITAMIN D 25 HYDROXY: CPT

## 2022-02-21 PROCEDURE — 85027 COMPLETE CBC AUTOMATED: CPT

## 2022-02-21 PROCEDURE — 83036 HEMOGLOBIN GLYCOSYLATED A1C: CPT

## 2022-02-22 ENCOUNTER — PATIENT MESSAGE (OUTPATIENT)
Dept: FAMILY MEDICINE CLINIC | Age: 77
End: 2022-02-22

## 2022-03-11 ENCOUNTER — OFFICE VISIT (OUTPATIENT)
Dept: DIABETES SERVICES | Facility: CLINIC | Age: 77
End: 2022-03-11

## 2022-03-11 ENCOUNTER — PATIENT MESSAGE (OUTPATIENT)
Dept: DIABETES SERVICES | Facility: CLINIC | Age: 77
End: 2022-03-11

## 2022-03-11 VITALS
TEMPERATURE: 97.8 F | HEIGHT: 62 IN | BODY MASS INDEX: 25.03 KG/M2 | SYSTOLIC BLOOD PRESSURE: 133 MMHG | HEART RATE: 80 BPM | RESPIRATION RATE: 18 BRPM | WEIGHT: 136 LBS | DIASTOLIC BLOOD PRESSURE: 71 MMHG | OXYGEN SATURATION: 96 %

## 2022-03-11 DIAGNOSIS — N18.31 TYPE 2 DIABETES MELLITUS WITH STAGE 3A CHRONIC KIDNEY DISEASE, WITH LONG-TERM CURRENT USE OF INSULIN: ICD-10-CM

## 2022-03-11 DIAGNOSIS — E11.65 UNCONTROLLED TYPE 2 DIABETES MELLITUS WITH HYPERGLYCEMIA: Primary | ICD-10-CM

## 2022-03-11 DIAGNOSIS — Z79.4 TYPE 2 DIABETES MELLITUS WITH STAGE 3A CHRONIC KIDNEY DISEASE, WITH LONG-TERM CURRENT USE OF INSULIN: ICD-10-CM

## 2022-03-11 DIAGNOSIS — E11.22 TYPE 2 DIABETES MELLITUS WITH STAGE 3A CHRONIC KIDNEY DISEASE, WITH LONG-TERM CURRENT USE OF INSULIN: ICD-10-CM

## 2022-03-11 PROCEDURE — 99214 OFFICE O/P EST MOD 30 MIN: CPT | Performed by: NURSE PRACTITIONER

## 2022-03-11 NOTE — PROGRESS NOTES
Chief Complaint  Diabetes (Blood sugars seem fine, but a1c is going up, kidney doctor wants to talk with you about meds)    Referred By: No ref. provider found    Subjective          Dacia Napier presents to [facility] for diabetes medication management    History of Present Illness    Visit type:  follow-up  Diabetes type:  Type 2  Current diabetes status/concerns/issues: She denies any specific concerns other than her her A1c going up.  She has seen her nephrologist who asked that we consider renal friendly medications.  Other health concerns: She has had difficulty after receiving her Prolia injection with lots of aches and pains  Diabetes symptoms:    Polyuria: No   Polydipsia: No   Polyphagia: No   Blurred vision: No   Excessive fatigue: No  Diabetes complications:  Neuropathy:No  Nephropathy:Yes, stage III renal disease  Retinopathy:No  Amputation/Wounds:No  Gastroparesis:No  Cardiovascular Disease:Yes, Hypercholesterolemia  Erectile Dysfunction:N/A  Hypoglycemia:  None reported at this time  Hypoglycemia Symptoms:  No hypoglycemia at this time  Current diabetes treatment: She is currently managing her diabetes through diet and exercise alone  Blood glucose device:  Meter  Blood glucose monitoring frequency:  1  Blood glucose range/average: Her meter shows a 30-day glucose average of 136  Diet:  Avoids high carb/sweet foods, Diet drinks only  Activity/Exercise:  None    Past Medical History:   Diagnosis Date   • Colon polyp    • Diabetes mellitus (HCC)    • GERD (gastroesophageal reflux disease)    • Hiatal hernia    • Hyperlipidemia    • Rapid or irregular heartbeat    • Seasonal allergies    • Type 2 diabetes mellitus (HCC)      Past Surgical History:   Procedure Laterality Date   • CATARACT EXTRACTION, BILATERAL     • COLONOSCOPY     • COLONOSCOPY N/A 6/25/2021    Procedure: COLONOSCOPY;  Surgeon: Ty Partida MD;  Location: [facility] ENDOSCOPY;  Service: General;   Laterality: N/A;  Colon polyp   • ENDOSCOPY N/A 6/25/2021    Procedure: ESOPHAGOGASTRODUODENOSCOPY;  Surgeon: Ty Partida MD;  Location: [facility] ENDOSCOPY;  Service: General;  Laterality: N/A;  Hiatal Hernia   • EYE CAPSULOTOMY WITH LASER     • HERNIA REPAIR     • HYSTERECTOMY     • LASIK     • TONSILLECTOMY       Family History   Problem Relation Age of Onset   • Heart disease Mother    • Heart disease Father    • Heart disease Sister    • Heart disease Brother    • Malig Hyperthermia Neg Hx      Social History     Socioeconomic History   • Marital status:    Tobacco Use   • Smoking status: Never Smoker   • Smokeless tobacco: Never Used   Vaping Use   • Vaping Use: Never used   Substance and Sexual Activity   • Alcohol use: Never   • Drug use: Never   • Sexual activity: Defer     No Known Allergies    Current Outpatient Medications:   •  aspirin (aspirin) 81 MG EC tablet, , Disp: , Rfl:   •  Calcium Carbonate 1500 (600 Ca) MG tablet, 2 (Two) Times a Day With Meals., Disp: , Rfl:   •  cetirizine (zyrTEC) 10 MG tablet, Take 10 mg by mouth Daily As Needed., Disp: , Rfl:   •  Cholecalciferol (Vitamin D3) 50 MCG (2000 UT) tablet, Take 2,000 Units by mouth Daily., Disp: , Rfl:   •  denosumab (XGEVA) 120 MG/1.7ML solution injection, Inject  under the skin into the appropriate area as directed 1 (One) Time., Disp: , Rfl:   •  estradiol (ESTRACE) 0.1 MG/GM vaginal cream, INSERT 2 GRAMS VAGINALLY 2 TIMES A WEEK, Disp: 42.5 g, Rfl: 3  •  ezetimibe (ZETIA) 10 MG tablet, Take 1 tablet by mouth Daily., Disp: 90 tablet, Rfl: 1  •  FeroSul 325 (65 Fe) MG tablet, TAKE 1 TABLET BY MOUTH 2 TIMES DAILY (Patient taking differently: Take 325 mg by mouth Every Other Day.), Disp: 60 tablet, Rfl: 3  •  fluticasone (Flonase) 50 MCG/ACT nasal spray, 2 sprays into the nostril(s) as directed by provider Daily., Disp: 16 g, Rfl: 2  •  Glucose Blood (ACCU-CHEK GUIDE VI), by In Vitro route., Disp: , Rfl:   •  glucose blood test strip,  1 each by Other route 2 (Two) Times a Day. Check blood sugar bid, Disp: 100 each, Rfl: 11  •  Lancets (onetouch ultrasoft) lancets, 1 each by Other route 2 (Two) Times a Day. Use as instructed, Disp: 100 each, Rfl: 11  •  Omega-3 1000 MG capsule, Take 1,000 mg by mouth Daily., Disp: , Rfl:   •  omeprazole (prilOSEC) 10 MG capsule, Take 1 capsule by mouth 2 (Two) Times a Day., Disp: 90 capsule, Rfl: 1  •  rosuvastatin (CRESTOR) 10 MG tablet, Take 1 tablet by mouth Every Night., Disp: 90 tablet, Rfl: 1  •  empagliflozin (Jardiance) 10 MG tablet tablet, Take 1 tablet by mouth Daily for 30 days., Disp: 30 tablet, Rfl: 3  •  Insulin Pen Needle 32G X 4 MM misc, , Disp: , Rfl:     Current Facility-Administered Medications:   •  denosumab (PROLIA) syringe 60 mg, 60 mg, Subcutaneous, Q6 Months, Mata Love MD, 60 mg at 01/26/22 1108    Review of Systems   Constitutional: Negative for activity change, appetite change, fatigue, fever, unexpected weight gain and unexpected weight loss.   HENT: Negative for congestion, ear pain, facial swelling, hearing loss, sore throat and tinnitus.    Eyes: Negative for blurred vision, double vision, redness and visual disturbance.   Respiratory: Negative for cough, shortness of breath and wheezing.    Cardiovascular: Negative for chest pain, palpitations and leg swelling.   Gastrointestinal: Positive for constipation and GERD. Negative for abdominal distention, diarrhea, nausea, vomiting and indigestion.   Endocrine: Negative for polydipsia, polyphagia and polyuria.   Genitourinary: Negative for difficulty urinating, frequency and urgency.   Musculoskeletal: Negative for back pain, gait problem and myalgias.   Skin: Negative for rash, skin lesions and wound.   Neurological: Negative for seizures, speech difficulty, weakness, headache and confusion.   Psychiatric/Behavioral: Negative for sleep disturbance, depressed mood and stress. The patient is not nervous/anxious.      "    Objective     Vitals:    03/11/22 1345   BP: 133/71   BP Location: Left arm   Patient Position: Sitting   Cuff Size: Adult   Pulse: 80   Resp: 18   Temp: 97.8 °F (36.6 °C)   SpO2: 96%   Weight: 61.7 kg (136 lb)   Height: 157.5 cm (62\")   PainSc: 0-No pain     Body mass index is 24.87 kg/m².    Physical Exam  Constitutional:       Appearance: Normal appearance. She is normal weight.   HENT:      Head: Normocephalic and atraumatic.      Right Ear: External ear normal.      Left Ear: External ear normal.      Nose: Nose normal.   Eyes:      Extraocular Movements: Extraocular movements intact.      Conjunctiva/sclera: Conjunctivae normal.   Pulmonary:      Effort: Pulmonary effort is normal.   Musculoskeletal:         General: Normal range of motion.      Cervical back: Normal range of motion.   Skin:     General: Skin is warm and dry.   Neurological:      General: No focal deficit present.      Mental Status: She is alert and oriented to person, place, and time. Mental status is at baseline.   Psychiatric:         Mood and Affect: Mood normal.         Behavior: Behavior normal.         Thought Content: Thought content normal.         Judgment: Judgment normal.         Result Review :   The following data was reviewed by: JANES Betancourt on 03/11/2022:    Her most recent A1c was collected on 2/21/2022 and was 7.1% indicating uncontrolled type 2 diabetes.  This is up from the prior result of 6.7 collected in November 22, 2021    Most Recent A1C    HGBA1C Most Recent 2/21/22   Hemoglobin A1C 7.10 (A)   (A) Abnormal value              A1C Last 3 Results    HGBA1C Last 3 Results 9/1/21 11/22/21 2/21/22   Hemoglobin A1C 6.30 (A) 6.70 (A) 7.10 (A)   (A) Abnormal value                Creatinine   Date Value Ref Range Status   02/21/2022 1.20 (H) 0.57 - 1.00 mg/dL Final   11/22/2021 1.09 (H) 0.57 - 1.00 mg/dL Final     eGFR Non  Amer   Date Value Ref Range Status   02/21/2022 44 (L) >60 mL/min/1.73 Final "   11/22/2021 49 (L) >60 mL/min/1.73 Final     Labs collected on 2/21/2022 show stage III renal disease.          Assessment: The patient has been trying to manage her diabetes through diet and exercise alone however her A1c is progressively increasing.  She is concerned about taking medications that will not cause harm to her kidneys.        Diagnoses and all orders for this visit:    1. Uncontrolled type 2 diabetes mellitus with hyperglycemia (HCC) (Primary)    2. Type 2 diabetes mellitus with stage 3a chronic kidney disease, with long-term current use of insulin (HCC)    Other orders  -     empagliflozin (Jardiance) 10 MG tablet tablet; Take 1 tablet by mouth Daily for 30 days.  Dispense: 30 tablet; Refill: 3        Plan: I communicated with the patient's nephrology nurse practitioner, Giulia Benavides.  I suggested starting the patient on Jardiance and she was in agreement with this plan.  Patient will be started on Jardiance 10 mg once a day.  The potential adverse reactions to this medication was described to the patient and she will contact our office should she experience any of those.      The patient will monitor her blood glucose levels 1-2 times each day.  If she develops problematic hyperglycemia or hypoglycemia or adverse drug reaction, she will contact the office for further instructions.        Follow Up     Return in about 3 months (around 6/11/2022).    Patient was given instructions and counseling regarding her condition or for health maintenance advice. Please see specific information pulled into the AVS if appropriate.     Annabelle Buckley, APRN  03/11/2022

## 2022-03-28 RX ORDER — FLUCONAZOLE 150 MG/1
150 TABLET ORAL ONCE
Qty: 2 TABLET | Refills: 0 | Status: SHIPPED | OUTPATIENT
Start: 2022-03-28 | End: 2022-03-28

## 2022-04-06 RX ORDER — OMEPRAZOLE 10 MG/1
CAPSULE, DELAYED RELEASE ORAL
Qty: 90 CAPSULE | Refills: 1 | Status: SHIPPED | OUTPATIENT
Start: 2022-04-06 | End: 2022-06-03 | Stop reason: DRUGHIGH

## 2022-04-06 NOTE — TELEPHONE ENCOUNTER
Rx Refill Note  Requested Prescriptions     Pending Prescriptions Disp Refills   • omeprazole (prilOSEC) 10 MG capsule [Pharmacy Med Name: OMEPRAZOLE 10MG CAPSULES] 90 capsule 1     Sig: TAKE 1 CAPSULE BY MOUTH TWICE DAILY      Last office visit with prescribing clinician: 9/1/2021      Next office visit with prescribing clinician: 6/3/2022  Last fill: 01/06/22, #90, 1 refill    Taylor Adams LPN  04/06/22, 14:01 EDT

## 2022-04-07 ENCOUNTER — PATIENT MESSAGE (OUTPATIENT)
Dept: DIABETES SERVICES | Facility: CLINIC | Age: 77
End: 2022-04-07

## 2022-04-07 DIAGNOSIS — N30.00 ACUTE CYSTITIS WITHOUT HEMATURIA: Primary | ICD-10-CM

## 2022-04-07 RX ORDER — FLUCONAZOLE 150 MG/1
150 TABLET ORAL ONCE
Qty: 2 TABLET | Refills: 1 | Status: SHIPPED | OUTPATIENT
Start: 2022-04-07 | End: 2022-04-07

## 2022-04-07 NOTE — TELEPHONE ENCOUNTER
From: Dacia Napier  To: JANES Betancourt  Sent: 4/7/2022 9:09 AM EDT  Subject: Yeast infection     I can’t seem to get rid of the yeast infection. I took farxiga for 8 days and got a yeast infection. I took something and continued to take 5 more days, and yeast infection came back. You gave me jardiance samples, and I started taking them. I have yeast infection again. I’m not sure if it goes away or if comes back.      I need another prescription. I need to talk to you about what I need to do going forward. I am almost out of jardiance and it is $400 for first time because of my deductible. Could you please call me?

## 2022-04-08 ENCOUNTER — LAB (OUTPATIENT)
Dept: LAB | Facility: HOSPITAL | Age: 77
End: 2022-04-08

## 2022-04-08 DIAGNOSIS — R30.0 DYSURIA: ICD-10-CM

## 2022-04-08 DIAGNOSIS — R30.0 DYSURIA: Primary | ICD-10-CM

## 2022-04-08 DIAGNOSIS — N30.00 ACUTE CYSTITIS WITHOUT HEMATURIA: ICD-10-CM

## 2022-04-08 LAB
BACTERIA UR QL AUTO: ABNORMAL /HPF
BILIRUB UR QL STRIP: NEGATIVE
CLARITY UR: CLEAR
COLOR UR: YELLOW
GLUCOSE UR STRIP-MCNC: ABNORMAL MG/DL
HGB UR QL STRIP.AUTO: ABNORMAL
HYALINE CASTS UR QL AUTO: ABNORMAL /LPF
KETONES UR QL STRIP: NEGATIVE
LEUKOCYTE ESTERASE UR QL STRIP.AUTO: ABNORMAL
NITRITE UR QL STRIP: NEGATIVE
PH UR STRIP.AUTO: 5.5 [PH] (ref 5–8)
PROT UR QL STRIP: NEGATIVE
RBC # UR STRIP: ABNORMAL /HPF
REF LAB TEST METHOD: ABNORMAL
SP GR UR STRIP: 1.01 (ref 1–1.03)
SQUAMOUS #/AREA URNS HPF: ABNORMAL /HPF
TRANS CELLS #/AREA URNS HPF: ABNORMAL /HPF
UROBILINOGEN UR QL STRIP: ABNORMAL
WBC # UR STRIP: ABNORMAL /HPF
WBC CLUMPS # UR AUTO: ABNORMAL /HPF

## 2022-04-08 PROCEDURE — 87186 SC STD MICRODIL/AGAR DIL: CPT

## 2022-04-08 PROCEDURE — 81001 URINALYSIS AUTO W/SCOPE: CPT

## 2022-04-08 PROCEDURE — 87077 CULTURE AEROBIC IDENTIFY: CPT

## 2022-04-08 PROCEDURE — 87086 URINE CULTURE/COLONY COUNT: CPT

## 2022-04-08 RX ORDER — PHENAZOPYRIDINE HYDROCHLORIDE 100 MG/1
100 TABLET, FILM COATED ORAL 3 TIMES DAILY PRN
Qty: 20 TABLET | Refills: 0 | Status: SHIPPED | OUTPATIENT
Start: 2022-04-08 | End: 2022-06-03

## 2022-04-10 LAB — BACTERIA SPEC AEROBE CULT: ABNORMAL

## 2022-04-10 RX ORDER — SULFAMETHOXAZOLE AND TRIMETHOPRIM 800; 160 MG/1; MG/1
1 TABLET ORAL 2 TIMES DAILY
Qty: 20 TABLET | Refills: 0 | Status: SHIPPED | OUTPATIENT
Start: 2022-04-10 | End: 2022-04-20

## 2022-04-13 ENCOUNTER — OFFICE VISIT (OUTPATIENT)
Dept: CARDIOLOGY | Facility: CLINIC | Age: 77
End: 2022-04-13

## 2022-04-13 VITALS
WEIGHT: 135 LBS | HEIGHT: 62 IN | BODY MASS INDEX: 24.84 KG/M2 | DIASTOLIC BLOOD PRESSURE: 63 MMHG | HEART RATE: 79 BPM | SYSTOLIC BLOOD PRESSURE: 114 MMHG

## 2022-04-13 DIAGNOSIS — R00.2 PALPITATIONS: Primary | ICD-10-CM

## 2022-04-13 DIAGNOSIS — R01.1 HEART MURMUR: ICD-10-CM

## 2022-04-13 DIAGNOSIS — E78.5 HYPERLIPIDEMIA LDL GOAL <100: ICD-10-CM

## 2022-04-13 PROCEDURE — 99204 OFFICE O/P NEW MOD 45 MIN: CPT | Performed by: INTERNAL MEDICINE

## 2022-04-13 PROCEDURE — 93000 ELECTROCARDIOGRAM COMPLETE: CPT | Performed by: INTERNAL MEDICINE

## 2022-04-13 NOTE — ASSESSMENT & PLAN NOTE
She has a soft apical systolic murmur that sounds like mitral regurgitation.  We will do a Holter monitor to elucidate the murmur

## 2022-04-13 NOTE — ASSESSMENT & PLAN NOTE
Heart palpitations lasting a few seconds occurring every 4 to 6 weeks.  The last episode of sustained palpitations was over 3 years.  I suspect she is having episodes of PSVT.  She does not want anything done by review of ablation at present since her symptoms are so infrequent.

## 2022-04-13 NOTE — PROGRESS NOTES
New Patient Office Visit    Chief Complaint  Hyperlipidemia and Establish Care    Subjective            Dacia Napier presents to [facility]  Dacia is a 76 years old female who when she was in [place] she was getting a echocardiogram and a stress test every year.  Her major complaint has been palpitations with the last episode occurred 2019.  That is when she had a prolonged spell of rapid palpitations.  She was extensively evaluated and told that she does not have a life-threatening condition and if she wanted she could get it fixed.  At that time she decided not.  She routinely approximately once every 3 to 6 weeks feels rapid beating in her neck in the moment she starts to feel that she puts her fingers on her carotids I am emphasizes it very goes away almost every time.  She rarely has to do it for more than a few seconds.  It makes her dizzy occasionally.  She denies chest pain.  The last episode of syncope was many years ago      Past Medical History:   Diagnosis Date   • Arrhythmia    • Colon polyp    • Diabetes mellitus (HCC)    • GERD (gastroesophageal reflux disease)    • Hiatal hernia    • Hyperlipidemia    • Rapid or irregular heartbeat    • Seasonal allergies    • Type 2 diabetes mellitus (HCC)        No Known Allergies     Past Surgical History:   Procedure Laterality Date   • CATARACT EXTRACTION, BILATERAL     • COLONOSCOPY     • COLONOSCOPY N/A 6/25/2021    Procedure: COLONOSCOPY;  Surgeon: Ty Partida MD;  Location: [facility] ENDOSCOPY;  Service: General;  Laterality: N/A;  Colon polyp   • ENDOSCOPY N/A 6/25/2021    Procedure: ESOPHAGOGASTRODUODENOSCOPY;  Surgeon: Ty Partida MD;  Location: [facility] ENDOSCOPY;  Service: General;  Laterality: N/A;  Hiatal Hernia   • EYE CAPSULOTOMY WITH LASER     • HERNIA REPAIR     • HYSTERECTOMY     • LASIK     • TONSILLECTOMY          Social History     Tobacco Use   • Smoking status: Never Smoker   • Smokeless tobacco: Never Used    Vaping Use   • Vaping Use: Never used   Substance Use Topics   • Alcohol use: Never   • Drug use: Never       Family History   Problem Relation Age of Onset   • Heart disease Mother    • Heart disease Father    • Heart disease Sister    • Heart disease Brother    • Malig Hyperthermia Neg Hx         Prior to Admission medications    Medication Sig Start Date End Date Taking? Authorizing Provider   aspirin (aspirin) 81 MG EC tablet    Yes Lavelle Denny MD   Calcium Carbonate 1500 (600 Ca) MG tablet 2 (Two) Times a Day With Meals.   Yes Lavelle Denny MD   cetirizine (zyrTEC) 10 MG tablet Take 10 mg by mouth Daily As Needed.   Yes Lavelle Denny MD   Cholecalciferol (Vitamin D3) 50 MCG (2000 UT) tablet Take 2,000 Units by mouth Daily.   Yes Lavelle Denny MD   empagliflozin (Jardiance) 25 MG tablet tablet Take 25 mg by mouth Daily.   Yes Lavelle Denny MD   estradiol (ESTRACE) 0.1 MG/GM vaginal cream INSERT 2 GRAMS VAGINALLY 2 TIMES A WEEK 12/22/21  Yes Mata Love MD   ezetimibe (ZETIA) 10 MG tablet Take 1 tablet by mouth Daily. 1/6/22  Yes Mata Love MD   FeroSul 325 (65 Fe) MG tablet TAKE 1 TABLET BY MOUTH 2 TIMES DAILY  Patient taking differently: Take 325 mg by mouth Every Other Day. 8/26/21  Yes Mata Love MD   fluticasone (Flonase) 50 MCG/ACT nasal spray 2 sprays into the nostril(s) as directed by provider Daily. 1/20/22  Yes Mata Love MD   Glucose Blood (ACCU-CHEK GUIDE VI) by In Vitro route.   Yes Lavelle Denny MD   glucose blood test strip 1 each by Other route 2 (Two) Times a Day. Check blood sugar bid 7/30/21  Yes Mata Love MD   Insulin Pen Needle 32G X 4 MM misc    Yes Lavelle Denny MD   Lancets (onetouch ultrasoft) lancets 1 each by Other route 2 (Two) Times a Day. Use as instructed 7/30/21  Yes Mata Love MD   Omega-3 1000 MG capsule Take 1,000 mg by mouth Daily.    "Yes ProviderLavelle MD   omeprazole (prilOSEC) 10 MG capsule TAKE 1 CAPSULE BY MOUTH TWICE DAILY 4/6/22  Yes Mata Love MD   phenazopyridine (PYRIDIUM) 100 MG tablet Take 1 tablet by mouth 3 (Three) Times a Day As Needed for Bladder Spasms. 4/8/22  Yes Cierra Trinidad APRN   rosuvastatin (CRESTOR) 10 MG tablet Take 1 tablet by mouth Every Night. 1/6/22  Yes Mata Love MD   sulfamethoxazole-trimethoprim (Bactrim DS) 800-160 MG per tablet Take 1 tablet by mouth 2 (Two) Times a Day for 10 days. 4/10/22 4/20/22 Yes Cierra Trinidad APRN   denosumab (XGEVA) 120 MG/1.7ML solution injection Inject  under the skin into the appropriate area as directed 1 (One) Time.    ProviderLavelle MD        Review of Systems   Constitutional: Positive for fatigue. Negative for unexpected weight gain and unexpected weight loss.   HENT: Positive for sinus pressure. Negative for hearing loss and swollen glands.    Eyes: Negative for blurred vision and double vision.   Respiratory: Negative for cough, shortness of breath and wheezing.    Cardiovascular: Negative for chest pain, palpitations and leg swelling.   Gastrointestinal: Positive for nausea. Negative for vomiting.   Endocrine: Negative for cold intolerance, heat intolerance, polydipsia and polyuria.   Musculoskeletal: Negative for arthralgias and back pain.   Neurological: Positive for headache. Negative for dizziness and light-headedness.   Hematological: Bruises/bleeds easily.        Objective     /63   Pulse 79   Ht 157.5 cm (62\")   Wt 61.2 kg (135 lb)   BMI 24.69 kg/m²       Physical Exam  Constitutional:       General: She is awake. She is not in acute distress.     Appearance: Normal appearance.   HENT:      Nose: No congestion.   Eyes:      Extraocular Movements: Extraocular movements intact.      Conjunctiva/sclera: Conjunctivae normal.      Pupils: Pupils are equal, round, and reactive to light.   Neck:      Thyroid: No " thyromegaly.      Vascular: No carotid bruit or JVD.   Cardiovascular:      Rate and Rhythm: Normal rate and regular rhythm.      Chest Wall: PMI is not displaced.      Pulses: Normal pulses.      Heart sounds: Normal heart sounds, S1 normal and S2 normal. No murmur heard.    No friction rub. No gallop. No S3 or S4 sounds.   Pulmonary:      Effort: Pulmonary effort is normal.      Breath sounds: Normal breath sounds and air entry. No wheezing, rhonchi or rales.   Chest:      Chest wall: No tenderness.   Abdominal:      General: Bowel sounds are normal.      Palpations: Abdomen is soft. There is no mass.      Tenderness: There is no abdominal tenderness.      Comments: Negative for hepatomegaly and splenomegaly   Musculoskeletal:      Cervical back: Neck supple. No tenderness.      Right lower leg: No edema.      Left lower leg: No edema.   Skin:     General: Skin is warm and dry.      Findings: No petechiae or rash.      Nails: There is no clubbing.   Neurological:      General: No focal deficit present.      Mental Status: She is alert and oriented to person, place, and time.   Psychiatric:         Mood and Affect: Mood normal.         Behavior: Behavior is cooperative.       No results found for: PROBNP, BNP  CMP    CMP 9/1/21 11/22/21 2/21/22   Glucose 130 (A) 132 (A) 139 (A)   BUN 14 17 15   Creatinine 0.92 1.09 (A) 1.20 (A)   eGFR Non African Am 60 (A) 49 (A) 44 (A)   Sodium 139 138 140   Potassium 4.7 4.5 4.6   Chloride 103 102 102   Calcium 9.6 9.6 9.6   Albumin 4.60 4.30 4.20   Total Bilirubin 0.3 0.4 0.6   Alkaline Phosphatase 88 82 88   AST (SGOT) 16 21 21   ALT (SGPT) 11 14 15   (A) Abnormal value            CBC w/diff    CBC w/Diff 6/11/21 11/22/21 2/21/22   WBC 6.01 6.45 6.01   RBC 4.63 4.70 4.69   Hemoglobin 13.0 13.3 13.7   Hematocrit 41.3 42.7 41.8   MCV 89.2 90.9 89.1   MCH 28.1 28.3 29.2   MCHC 31.5 31.1 (A) 32.8   RDW 13.5 13.6 12.3   Platelets 259 249 266   Neutrophil Rel % 67.6 64.1 65.2    Immature Granulocyte Rel % 0.3 0.5    Lymphocyte Rel % 22.1 21.1 20.8   Monocyte Rel % 8.0 11.0 9.3   Eosinophil Rel % 1.8 2.8 3.5   Basophil Rel % 0.2 0.5 0.5   (A) Abnormal value             Lipid Panel    Lipid Panel 9/1/21 2/21/22   Total Cholesterol 121 149   Triglycerides 146 203 (A)   HDL Cholesterol 54 46   VLDL Cholesterol 25 34   LDL Cholesterol  42 69   LDL/HDL Ratio 0.70 1.36   (A) Abnormal value             Lab Results   Component Value Date    TSH 2.160 04/16/2021    TSH 3.400 10/23/2020      No results found for: FREET4   No results found for: DDIMERQUANT  No results found for: MG   No results found for: DIGOXIN   A1C Last 3 Results    HGBA1C Last 3 Results 9/1/21 11/22/21 2/21/22   Hemoglobin A1C 6.30 (A) 6.70 (A) 7.10 (A)   (A) Abnormal value                   Result Review :                    ECG 12 Lead    Date/Time: 4/13/2022 4:28 PM  Performed by: Efrem Ventura MD  Authorized by: Efrem Ventura MD   Comments: Sinus rhythm left axis deviation nonspecific anterior T wave changes.  No previous EKG available for comparison                Assessment and Plan        Diagnoses and all orders for this visit:    1. Palpitations (Primary)  Assessment & Plan:  Heart palpitations lasting a few seconds occurring every 4 to 6 weeks.  The last episode of sustained palpitations was over 3 years.  I suspect she is having episodes of PSVT.  She does not want anything done by review of ablation at present since her symptoms are so infrequent.    Orders:  -     Adult Transthoracic Echo Complete W/ Cont if Necessary Per Protocol; Future  -     Lipid Panel; Future  -     CBC & Differential; Future  -     Comprehensive Metabolic Panel; Future  -     T4, Free; Future  -     TSH; Future  -     Magnesium; Future    2. Heart murmur  Assessment & Plan:  She has a soft apical systolic murmur that sounds like mitral regurgitation.  We will do a Holter monitor to elucidate the murmur    Orders:  -     Adult  Transthoracic Echo Complete W/ Cont if Necessary Per Protocol; Future  -     Lipid Panel; Future  -     CBC & Differential; Future  -     Comprehensive Metabolic Panel; Future  -     T4, Free; Future  -     TSH; Future  -     Magnesium; Future    3. Hyperlipidemia LDL goal <100  Assessment & Plan:  Her lipid abnormalities are regulated on Crestor and she will continue the same in the current dosage    Orders:  -     Lipid Panel; Future  -     CBC & Differential; Future  -     Comprehensive Metabolic Panel; Future  -     T4, Free; Future  -     TSH; Future  -     Magnesium; Future    Other orders  -     ECG 12 Lead          Follow Up     Return for echo.    Patient was given instructions and counseling regarding her condition or for health maintenance advice. Please see specific information pulled into the AVS if appropriate.     Efrem Ventura MD  04/13/22 13:24 EDT

## 2022-04-13 NOTE — ASSESSMENT & PLAN NOTE
Her lipid abnormalities are regulated on Crestor and she will continue the same in the current dosage

## 2022-04-19 RX ORDER — FLUTICASONE PROPIONATE 50 MCG
SPRAY, SUSPENSION (ML) NASAL
Qty: 16 G | Refills: 2 | Status: SHIPPED | OUTPATIENT
Start: 2022-04-19 | End: 2022-07-25 | Stop reason: SDUPTHER

## 2022-04-26 ENCOUNTER — LAB (OUTPATIENT)
Dept: LAB | Facility: HOSPITAL | Age: 77
End: 2022-04-26

## 2022-04-26 DIAGNOSIS — R30.0 DYSURIA: ICD-10-CM

## 2022-04-26 DIAGNOSIS — R30.0 DYSURIA: Primary | ICD-10-CM

## 2022-04-26 LAB
BACTERIA UR QL AUTO: ABNORMAL /HPF
BILIRUB UR QL STRIP: NEGATIVE
CLARITY UR: CLEAR
COLOR UR: YELLOW
GLUCOSE UR STRIP-MCNC: ABNORMAL MG/DL
HGB UR QL STRIP.AUTO: ABNORMAL
KETONES UR QL STRIP: NEGATIVE
LEUKOCYTE ESTERASE UR QL STRIP.AUTO: NEGATIVE
NITRITE UR QL STRIP: NEGATIVE
PH UR STRIP.AUTO: 6 [PH] (ref 5–8)
PROT UR QL STRIP: NEGATIVE
RBC # UR STRIP: ABNORMAL /HPF
REF LAB TEST METHOD: ABNORMAL
SP GR UR STRIP: 1.01 (ref 1–1.03)
SQUAMOUS #/AREA URNS HPF: ABNORMAL /HPF
UROBILINOGEN UR QL STRIP: ABNORMAL
WBC # UR STRIP: ABNORMAL /HPF

## 2022-04-26 PROCEDURE — 81001 URINALYSIS AUTO W/SCOPE: CPT

## 2022-04-26 PROCEDURE — 87086 URINE CULTURE/COLONY COUNT: CPT

## 2022-04-27 LAB — BACTERIA SPEC AEROBE CULT: NO GROWTH

## 2022-06-03 ENCOUNTER — TELEMEDICINE (OUTPATIENT)
Dept: FAMILY MEDICINE CLINIC | Age: 77
End: 2022-06-03

## 2022-06-03 DIAGNOSIS — R01.1 HEART MURMUR: ICD-10-CM

## 2022-06-03 DIAGNOSIS — R00.2 PALPITATIONS: ICD-10-CM

## 2022-06-03 DIAGNOSIS — E11.9 TYPE 2 DIABETES MELLITUS WITHOUT COMPLICATION, WITHOUT LONG-TERM CURRENT USE OF INSULIN: ICD-10-CM

## 2022-06-03 DIAGNOSIS — R05.9 COUGH: Primary | ICD-10-CM

## 2022-06-03 PROBLEM — N18.32 STAGE 3B CHRONIC KIDNEY DISEASE: Status: ACTIVE | Noted: 2022-02-28

## 2022-06-03 PROCEDURE — 99214 OFFICE O/P EST MOD 30 MIN: CPT | Performed by: FAMILY MEDICINE

## 2022-06-03 RX ORDER — GLIPIZIDE 5 MG/1
5 TABLET ORAL DAILY
COMMUNITY
Start: 2022-05-10 | End: 2022-06-25 | Stop reason: SDUPTHER

## 2022-06-03 RX ORDER — AMOXICILLIN 500 MG/1
500 CAPSULE ORAL 3 TIMES DAILY
Qty: 30 CAPSULE | Refills: 0 | Status: SHIPPED | OUTPATIENT
Start: 2022-06-03 | End: 2022-06-06

## 2022-06-03 RX ORDER — OMEPRAZOLE 20 MG/1
20 CAPSULE, DELAYED RELEASE ORAL DAILY
COMMUNITY
End: 2022-07-18

## 2022-06-03 RX ORDER — BENZONATATE 200 MG/1
200 CAPSULE ORAL 3 TIMES DAILY PRN
Qty: 30 CAPSULE | Refills: 1 | Status: SHIPPED | OUTPATIENT
Start: 2022-06-03 | End: 2022-06-13

## 2022-06-03 NOTE — ASSESSMENT & PLAN NOTE
CARDIOLOGY NOTES AND ECHO REPORT REVIEWED.  SHE WILL HAVE LABS IN THE NEAR FUTURE.  HOLTER REPORT PENDING

## 2022-06-03 NOTE — ASSESSMENT & PLAN NOTE
Diabetes is improving with treatment.   Continue current treatment regimen.  Diabetes will be reassessed in 3 months.  WILL ADD AN HGA1C TO HER UPCOMING LABS

## 2022-06-03 NOTE — PROGRESS NOTES
Mode of Visit: Video  You have chosen to receive care through a telehealth visit.  Do you consent to use a video/audio connection for your medical care today? Yes   The visit included audio and video interaction. No technical issues occurred during this visit.    Subjective       Chief Complaint   Patient presents with   • URI     Cough, sore throat, drainage; Dox video [phone]          HPI: { CC  Problem List  ROS  Visit Diagnosis  Labs  Encounters  Imaging  Media  Notes  --LOOSE COUGH FOR THREE DAYS WITH PURULENT NASAL DRAINAGE  --SAW CARDIOLOGY FOR PALPITATIONS AND A MURMUR.  ECHO WAS OK.  HOLTER IS PENDING BUT SHE STATES IT WAS OK.  LABS WERE ORDERED BUT SHE HAS NOT DRAWN THEM YET  --LAST HGA1C WAS 7.1 %, NOW SEEING JOE DAILEY   Dacia Napier is a 76 y.o. female who presents to  [facility]  [facility] today     Review of Systems   Constitutional: Negative for activity change, appetite change, chills, fatigue and fever.   HENT: Positive for congestion and rhinorrhea. Negative for ear pain and sore throat.    Eyes: Negative for discharge.   Respiratory: Negative for shortness of breath.    Cardiovascular: Negative for chest pain and leg swelling.   Gastrointestinal: Negative for abdominal pain, diarrhea, nausea and vomiting.   Musculoskeletal: Negative for arthralgias and myalgias.   Psychiatric/Behavioral: Negative for dysphoric mood.            PE:   Objective   There were no vitals taken for this visit.    There is no height or weight on file to calculate BMI.    Physical Exam   Constitutional: She appears well-developed and well-nourished.   HENT:   Head: Normocephalic.   Pulmonary/Chest: Effort normal.   Neurological: She is alert.   Psychiatric: She has a normal mood and affect.        Lab Results - Last 18 Months   Lab Units 02/21/22  1023 11/22/21  0939 09/01/21  1132 06/11/21  1009 05/14/21  1453 04/16/21  1150 04/16/21  1150   BUN mg/dL  15 17 14 14  --    < > 11   CREATININE mg/dL 1.20* 1.09* 0.92 0.92  --    < > 0.94*   EGFR IF NONAFRICN AM mL/min/1.73 44* 49* 60* 60*  --    < >  --    EGFR IF AFRICN AM mL/min/1.73  --   --   --  72  --   --   --    SODIUM mmol/L 140 138 139 140  --    < > 140   POTASSIUM mmol/L 4.6 4.5 4.7 4.3  --    < > 4.5   CHLORIDE mmol/L 102 102 103 100  --    < > 103   CALCIUM mg/dL 9.6 9.6 9.6 9.5  --    < > 9.4   ALBUMIN g/dL 4.20 4.30 4.60 4.70  --    < > 4.3   BILIRUBIN mg/dL 0.6 0.4 0.3 0.6  --    < > 0.37   ALK PHOS U/L 88 82 88 70  --    < > 68   AST (SGOT) U/L 21 21 16 19  --    < > 23   ALT (SGPT) U/L 15 14 11 13  --    < > 13   CHOLESTEROL mg/dL  --   --   --   --   --   --  94*   TRIGLYCERIDES mg/dL 203*  --  146  --   --   --  145   HDL CHOL mg/dL 46  --  54  --   --   --  44   VLDL CHOL mg/dL 34  --  25  --   --   --  29   LDL CHOL mg/dL 69  --  42  --   --   --  21*   LDL/HDL RATIO  1.36  --  0.70  --   --   --   --    HEMOGLOBIN A1C % 7.10* 6.70* 6.30*  --   --   --  6.0*   MICROALB UR mg/dL  --  1.2 <1.2 <1.2  --   --   --    WBC 10*3/mm3 6.01 6.45  --  6.01  --   --  6.12   RBC 10*6/mm3 4.69 4.70  --  4.63 NONE SEEN  --  4.36   HEMATOCRIT % 41.8 42.7  --  41.3  --   --  39.2   MCV fL 89.1 90.9  --  89.2  --   --  89.9   MCH pg 29.2 28.3  --  28.1  --   --  28.2   TSH m[iU]/L  --   --   --   --   --   --  2.160   VIT D 25 HYDROXY ng/ml 42.7  --   --   --  79.7  --   --     < > = values in this interval not displayed.        Data Reviewed:                A/P:     Assessment & Plan   Diagnoses and all orders for this visit:    1. Cough (Primary)  Comments:  POSSIBLE ACUTE BRONCHITIS, WILL COVER AS NOTED, ADVISE FLLUIDS, REST AND OTC MEDS PRN.  CONSIDER FURTHER WORKKUP IF SYMPTOMS PERSIST OR WORSEN   Orders:  -     amoxicillin (AMOXIL) 500 MG capsule; Take 1 capsule by mouth 3 (Three) Times a Day.  Dispense: 30 capsule; Refill: 0  -     benzonatate (TESSALON) 200 MG capsule; Take 1 capsule by mouth 3 (Three)  Times a Day As Needed for Cough for up to 10 days.  Dispense: 30 capsule; Refill: 1    2. Palpitations  Assessment & Plan:  CARDIOLOGY NOTES AND ECHO REPORT REVIEWED.  SHE WILL HAVE LABS IN THE NEAR FUTURE.  HOLTER REPORT PENDING      3. Heart murmur (neg ECHO in 2021)   Assessment & Plan:  OBSERVE FOR NOW, CARDIOLOGY NOTES AND ECHO REPORT REVIEWED               Follow up:    No follow-ups on file.

## 2022-06-06 ENCOUNTER — OFFICE VISIT (OUTPATIENT)
Dept: FAMILY MEDICINE CLINIC | Age: 77
End: 2022-06-06

## 2022-06-06 VITALS
DIASTOLIC BLOOD PRESSURE: 75 MMHG | BODY MASS INDEX: 24.55 KG/M2 | HEART RATE: 101 BPM | OXYGEN SATURATION: 95 % | WEIGHT: 133.4 LBS | HEIGHT: 62 IN | SYSTOLIC BLOOD PRESSURE: 135 MMHG | TEMPERATURE: 98.6 F

## 2022-06-06 DIAGNOSIS — R05.9 COUGH: ICD-10-CM

## 2022-06-06 DIAGNOSIS — J02.9 SORE THROAT: ICD-10-CM

## 2022-06-06 DIAGNOSIS — U07.1 COVID-19: Primary | ICD-10-CM

## 2022-06-06 DIAGNOSIS — R43.0 LOSS OF SMELL: ICD-10-CM

## 2022-06-06 LAB
EXPIRATION DATE: ABNORMAL
INTERNAL CONTROL: ABNORMAL
Lab: ABNORMAL
SARS-COV-2 AG UPPER RESP QL IA.RAPID: DETECTED

## 2022-06-06 PROCEDURE — 99213 OFFICE O/P EST LOW 20 MIN: CPT | Performed by: PHYSICIAN ASSISTANT

## 2022-06-06 PROCEDURE — 87426 SARSCOV CORONAVIRUS AG IA: CPT | Performed by: PHYSICIAN ASSISTANT

## 2022-06-06 RX ORDER — DEXTROMETHORPHAN HYDROBROMIDE AND PROMETHAZINE HYDROCHLORIDE 15; 6.25 MG/5ML; MG/5ML
5 SYRUP ORAL 4 TIMES DAILY PRN
Qty: 118 ML | Refills: 0 | Status: SHIPPED | OUTPATIENT
Start: 2022-06-06 | End: 2022-07-14

## 2022-06-06 NOTE — PROGRESS NOTES
Subjective     CHIEF COMPLAINT    Chief Complaint   Patient presents with   • Headache   • Sore Throat     Pt declined flu test.    • Nasal Congestion   • Generalized Body Aches     Pt took an at home covid test this am and it was +.             This is a 76-year-old female presents the clinic complaining of cough, body aches, congestion, headaches, and sore throat since 6/1/2022.  She did a telemedicine visit with her PCP on 6/3/2022 and was prescribed amoxicillin and cough medication.  She reports these have been helping her symptoms.  This morning she found out that her family member had tested positive for COVID-19 and that she has spent some time with him a couple of days last week.  This prompted her to take an at-home COVID-19 test which was positive.  She additionally reports that she has had decreased sense of smell today.                Review of Systems   Constitutional: Negative for chills, fatigue and fever.   HENT: Positive for congestion, rhinorrhea and sinus pain. Negative for sore throat.    Respiratory: Positive for cough. Negative for shortness of breath and wheezing.    Cardiovascular: Negative for chest pain.   Gastrointestinal: Negative for abdominal pain, diarrhea, nausea and vomiting.   Musculoskeletal: Negative for myalgias.   Skin: Negative for rash.   Neurological: Positive for headaches.            Past Medical History:   Diagnosis Date   • Arrhythmia    • Colon polyp    • Diabetes mellitus (HCC)    • GERD (gastroesophageal reflux disease)    • Hiatal hernia    • Hyperlipidemia    • Rapid or irregular heartbeat    • Seasonal allergies    • Type 2 diabetes mellitus (HCC)             Past Surgical History:   Procedure Laterality Date   • CATARACT EXTRACTION, BILATERAL     • COLONOSCOPY     • COLONOSCOPY N/A 6/25/2021    Procedure: COLONOSCOPY;  Surgeon: Ty Partida MD;  Location: [facility] ENDOSCOPY;  Service: General;  Laterality: N/A;  Colon polyp   • ENDOSCOPY N/A 6/25/2021     Procedure: ESOPHAGOGASTRODUODENOSCOPY;  Surgeon: Ty Partida MD;  Location: [facility] ENDOSCOPY;  Service: General;  Laterality: N/A;  Hiatal Hernia   • EYE CAPSULOTOMY WITH LASER     • HERNIA REPAIR     • HYSTERECTOMY     • LASIK     • TONSILLECTOMY              Family History   Problem Relation Age of Onset   • Heart disease Mother    • Heart disease Father    • Heart disease Sister    • Heart disease Brother    • Malig Hyperthermia Neg Hx             Social History     Socioeconomic History   • Marital status:    Tobacco Use   • Smoking status: Never Smoker   • Smokeless tobacco: Never Used   Vaping Use   • Vaping Use: Never used   Substance and Sexual Activity   • Alcohol use: Never   • Drug use: Never   • Sexual activity: Defer            No Known Allergies         Current Outpatient Medications on File Prior to Visit   Medication Sig Dispense Refill   • aspirin (aspirin) 81 MG EC tablet      • benzonatate (TESSALON) 200 MG capsule Take 1 capsule by mouth 3 (Three) Times a Day As Needed for Cough for up to 10 days. 30 capsule 1   • Calcium Carbonate 1500 (600 Ca) MG tablet 2 (Two) Times a Day With Meals.     • cetirizine (zyrTEC) 10 MG tablet Take 10 mg by mouth Daily As Needed.     • Cholecalciferol (Vitamin D3) 50 MCG (2000 UT) tablet Take 2,000 Units by mouth Daily.     • denosumab (XGEVA) 120 MG/1.7ML solution injection Inject  under the skin into the appropriate area as directed 1 (One) Time.     • estradiol (ESTRACE) 0.1 MG/GM vaginal cream INSERT 2 GRAMS VAGINALLY 2 TIMES A WEEK 42.5 g 3   • ezetimibe (ZETIA) 10 MG tablet Take 1 tablet by mouth Daily. (Patient taking differently: Take 20 mg by mouth Daily.) 90 tablet 1   • FeroSul 325 (65 Fe) MG tablet TAKE 1 TABLET BY MOUTH 2 TIMES DAILY (Patient taking differently: Take 325 mg by mouth Every Other Day.) 60 tablet 3   • fluticasone (FLONASE) 50 MCG/ACT nasal spray INSTILL 2 SPRAYS INTO EACH NOSTRIL EVERY DAY AS DIRECTED 16 g 2   • glipizide  "(GLUCOTROL) 5 MG tablet Take 5 mg by mouth Daily.     • Glucose Blood (ACCU-CHEK GUIDE VI) by In Vitro route.     • glucose blood test strip 1 each by Other route 2 (Two) Times a Day. Check blood sugar bid 100 each 11   • Omega-3 1000 MG capsule Take 1,000 mg by mouth Daily.     • omeprazole (priLOSEC) 20 MG capsule Take 20 mg by mouth Daily.     • rosuvastatin (CRESTOR) 10 MG tablet Take 1 tablet by mouth Every Night. 90 tablet 1   • [DISCONTINUED] amoxicillin (AMOXIL) 500 MG capsule Take 1 capsule by mouth 3 (Three) Times a Day. 30 capsule 0     Current Facility-Administered Medications on File Prior to Visit   Medication Dose Route Frequency Provider Last Rate Last Admin   • denosumab (PROLIA) syringe 60 mg  60 mg Subcutaneous Q6 Months Mata Love MD   60 mg at 01/26/22 1108            /75 (BP Location: Right arm, Patient Position: Sitting, Cuff Size: Adult)   Pulse 101   Temp 98.6 °F (37 °C) (Oral)   Ht 157.5 cm (62.01\")   Wt 60.5 kg (133 lb 6.4 oz)   SpO2 95% Comment: room air  BMI 24.39 kg/m²          Objective     Physical Exam  Vitals and nursing note reviewed.   Constitutional:       General: She is not in acute distress.     Appearance: Normal appearance.   HENT:      Head: Normocephalic and atraumatic.      Right Ear: Tympanic membrane, ear canal and external ear normal.      Left Ear: Tympanic membrane, ear canal and external ear normal.      Nose: Congestion present. No rhinorrhea.      Mouth/Throat:      Mouth: Mucous membranes are moist.      Pharynx: Oropharynx is clear. Posterior oropharyngeal erythema present.   Eyes:      Extraocular Movements: Extraocular movements intact.      Conjunctiva/sclera: Conjunctivae normal.      Pupils: Pupils are equal, round, and reactive to light.   Cardiovascular:      Rate and Rhythm: Normal rate and regular rhythm.      Heart sounds: Normal heart sounds.   Pulmonary:      Effort: Pulmonary effort is normal. No respiratory distress.      " Breath sounds: Normal breath sounds. No wheezing, rhonchi or rales.   Musculoskeletal:      Cervical back: Normal range of motion. No rigidity.   Lymphadenopathy:      Cervical: No cervical adenopathy.   Skin:     General: Skin is warm and dry.   Neurological:      Mental Status: She is alert and oriented to person, place, and time.   Psychiatric:         Mood and Affect: Mood normal.         Behavior: Behavior normal.              Procedures                    Lab Results (last 24 hours)     Procedure Component Value Units Date/Time    POCT SARS-CoV-2 Antigen ROBERTA [[id number]]  (Abnormal) Collected: 06/06/22 1359    Specimen: Swab Updated: 06/06/22 1400     SARS Antigen Detected     Internal Control Passed     Lot Number [id number]     Expiration Date 11/20/2022                No Radiology Exams Resulted Within Past 24 Hours                     Diagnoses and all orders for this visit:    1. COVID-19 (Primary)  Comments:  Unfortunately, Dacia has been having symptoms for greater than 5 days so antiviral therapeutics are not indicated. She will contact the clinic if worse.    2. Cough  -     POCT SARS-CoV-2 Antigen ROBERTA  -     promethazine-dextromethorphan (PROMETHAZINE-DM) 6.25-15 MG/5ML syrup; Take 5 mL by mouth 4 (Four) Times a Day As Needed for Cough.  Dispense: 118 mL; Refill: 0    3. Loss of smell    4. Sore throat                           FOR FULL DISCHARGE INSTRUCTIONS/COMMENTS/HANDOUTS please see the AVS

## 2022-06-06 NOTE — PATIENT INSTRUCTIONS
Your test in the office was positive for COVID-19.  Unfortunately, since it has been more than 5 days since the onset of your symptoms you do not qualify for the antiviral therapeutics as we discussed.  I have sent a prescription cough medicine to the pharmacy to help with your cough.  As we discussed, this can make you drowsy and I recommend using caution when you take it.  I also recommend you stop the amoxicillin as it has not been shown to be effective in treating COVID-19.  If you develop chest pain or shortness of breath please contact the office or go to the emergency department for further evaluation.    If You Test Positive for COVID-19 (Isolate)  Everyone, regardless of vaccination status.    Stay home for 5 days.  If you have no symptoms or your symptoms are resolving after 5 days, you can leave your house.  Continue to wear a mask around others for 5 additional days.  If you have a fever, continue to stay home until your fever resolves.    If You Were Exposed to Someone with COVID-19 (Quarantine)  If you:    Have been boosted  OR  Completed the primary series of Pfizer or Moderna vaccine within the last 6 months  OR  Completed the primary series of J&J vaccine within the last 2 months    Wear a mask around others for 10 days.  Test on day 5, if possible.  If you develop symptoms get a test and stay home.    If you:    Completed the primary series of Pfizer or Moderna vaccine over 6 months ago and are not boosted  OR  Completed the primary series of J&J over 2 months ago and are not boosted  OR  Are unvaccinated    Stay home for 5 days. After that continue to wear a mask around others for 5 additional days.  If you can’t quarantine you must wear a mask for 10 days.  Test on day 5 if possible.  If you develop symptoms get a test and stay home

## 2022-06-15 ENCOUNTER — TELEPHONE (OUTPATIENT)
Dept: FAMILY MEDICINE CLINIC | Age: 77
End: 2022-06-15

## 2022-06-15 NOTE — TELEPHONE ENCOUNTER
----- Message from Taylor Adams LPN sent at 6/15/2022  8:06 AM EDT -----      ----- Message -----  From: SYSTEM  Sent: 6/15/2022   1:12 AM EDT  To: [facility]

## 2022-06-17 ENCOUNTER — OFFICE VISIT (OUTPATIENT)
Dept: FAMILY MEDICINE CLINIC | Age: 77
End: 2022-06-17

## 2022-06-17 ENCOUNTER — HOSPITAL ENCOUNTER (OUTPATIENT)
Dept: GENERAL RADIOLOGY | Facility: HOSPITAL | Age: 77
Discharge: HOME | End: 2022-06-17
Admitting: NURSE PRACTITIONER

## 2022-06-17 VITALS
WEIGHT: 132.5 LBS | DIASTOLIC BLOOD PRESSURE: 70 MMHG | SYSTOLIC BLOOD PRESSURE: 134 MMHG | HEART RATE: 79 BPM | BODY MASS INDEX: 24.38 KG/M2 | OXYGEN SATURATION: 97 % | HEIGHT: 62 IN | TEMPERATURE: 99.4 F

## 2022-06-17 DIAGNOSIS — R05.9 COUGH: ICD-10-CM

## 2022-06-17 DIAGNOSIS — U07.1 COVID: Primary | ICD-10-CM

## 2022-06-17 DIAGNOSIS — U07.1 COVID: ICD-10-CM

## 2022-06-17 PROCEDURE — 99213 OFFICE O/P EST LOW 20 MIN: CPT | Performed by: NURSE PRACTITIONER

## 2022-06-17 PROCEDURE — 71046 X-RAY EXAM CHEST 2 VIEWS: CPT

## 2022-06-17 RX ORDER — METHYLPREDNISOLONE 4 MG/1
TABLET ORAL
Qty: 21 EACH | Refills: 0 | Status: SHIPPED | OUTPATIENT
Start: 2022-06-17 | End: 2022-07-14

## 2022-06-17 RX ORDER — ALBUTEROL SULFATE 90 UG/1
2 AEROSOL, METERED RESPIRATORY (INHALATION) EVERY 4 HOURS PRN
Qty: 18 G | Refills: 0 | Status: SHIPPED | OUTPATIENT
Start: 2022-06-17 | End: 2022-07-14

## 2022-06-17 NOTE — PROGRESS NOTES
"Chief Complaint  Cough (States she also has a lot mucus she states its a \"creamy\" color )    Subjective          Dacia Napier presents to [facility]  COVID + 06/06/2022. Treated w/ promethazine-DM.    Cough  This is a new problem. The current episode started 1 to 4 weeks ago. The problem has been waxing and waning. The cough is productive of sputum. Associated symptoms include postnasal drip and rhinorrhea (\"a little bit\"). Pertinent negatives include no chills, ear congestion, ear pain, fever, headaches, myalgias, nasal congestion, sore throat, shortness of breath, sweats or wheezing. Exacerbated by: Being upright. Risk factors: COVID. She has tried prescription cough suppressant (cough drops, Mucinex) for the symptoms. The treatment provided no relief. COVID       Objective   Vital Signs:   /70 (BP Location: Left arm, Patient Position: Sitting)   Pulse 79   Temp 99.4 °F (37.4 °C) (Oral)   Ht 157.5 cm (62\")   Wt 60.1 kg (132 lb 8 oz)   SpO2 97% Comment: on room air  BMI 24.23 kg/m²     Physical Exam  Constitutional:       General: She is not in acute distress.     Appearance: Normal appearance. She is normal weight.   HENT:      Head: Normocephalic.   Eyes:      Pupils: Pupils are equal, round, and reactive to light.      Visual Fields: Right eye visual fields normal and left eye visual fields normal.   Neck:      Trachea: Trachea normal.   Cardiovascular:      Rate and Rhythm: Normal rate and regular rhythm.      Heart sounds: Normal heart sounds.   Pulmonary:      Effort: Pulmonary effort is normal. No respiratory distress.      Breath sounds: Normal breath sounds and air entry. No wheezing, rhonchi or rales.   Musculoskeletal:      Right lower leg: No edema.      Left lower leg: No edema.   Skin:     General: Skin is warm and dry.   Neurological:      Mental Status: She is alert and oriented to person, place, and time.   Psychiatric:         Mood and Affect: Mood " and affect normal.         Behavior: Behavior normal.         Thought Content: Thought content normal.        Result Review :   The following data was reviewed by: JANES Goodman on 06/17/2022:                  Assessment and Plan    Diagnoses and all orders for this visit:    1. COVID (Primary)  -     XR Chest PA & Lateral; Future  -     methylPREDNISolone (MEDROL) 4 MG dose pack; Take as directed on package instructions.  Dispense: 21 each; Refill: 0    2. Cough  -     XR Chest PA & Lateral; Future  -     methylPREDNISolone (MEDROL) 4 MG dose pack; Take as directed on package instructions.  Dispense: 21 each; Refill: 0  -     albuterol sulfate  (90 Base) MCG/ACT inhaler; Inhale 2 puffs Every 4 (Four) Hours As Needed for Shortness of Air.  Dispense: 18 g; Refill: 0    We will get an x-ray of her lungs.  We will also send in her Medrol pack and albuterol inhaler in case she needs it.  We also discussed conservative measures to help improve cough such as warm liquids, honey, and increased water intake.      Follow Up   Return if symptoms worsen or fail to improve.  Patient was given instructions and counseling regarding her condition or for health maintenance advice. Please see specific information pulled into the AVS if appropriate.

## 2022-06-24 ENCOUNTER — APPOINTMENT (OUTPATIENT)
Dept: MAMMOGRAPHY | Facility: HOSPITAL | Age: 77
End: 2022-06-24

## 2022-06-24 ENCOUNTER — HOSPITAL ENCOUNTER (OUTPATIENT)
Dept: MAMMOGRAPHY | Facility: HOSPITAL | Age: 77
Discharge: HOME | End: 2022-06-24

## 2022-06-24 ENCOUNTER — APPOINTMENT (OUTPATIENT)
Dept: BONE DENSITY | Facility: HOSPITAL | Age: 77
End: 2022-06-24

## 2022-06-24 ENCOUNTER — HOSPITAL ENCOUNTER (OUTPATIENT)
Dept: BONE DENSITY | Facility: HOSPITAL | Age: 77
Discharge: HOME | End: 2022-06-24

## 2022-06-24 ENCOUNTER — LAB (OUTPATIENT)
Dept: LAB | Facility: HOSPITAL | Age: 77
End: 2022-06-24

## 2022-06-24 ENCOUNTER — PATIENT MESSAGE (OUTPATIENT)
Dept: DIABETES SERVICES | Facility: CLINIC | Age: 77
End: 2022-06-24

## 2022-06-24 DIAGNOSIS — E11.9 TYPE 2 DIABETES MELLITUS WITHOUT COMPLICATION, WITHOUT LONG-TERM CURRENT USE OF INSULIN: ICD-10-CM

## 2022-06-24 DIAGNOSIS — E78.5 HYPERLIPIDEMIA LDL GOAL <100: ICD-10-CM

## 2022-06-24 DIAGNOSIS — R00.2 PALPITATIONS: ICD-10-CM

## 2022-06-24 DIAGNOSIS — Z12.31 ENCOUNTER FOR SCREENING MAMMOGRAM FOR MALIGNANT NEOPLASM OF BREAST: ICD-10-CM

## 2022-06-24 DIAGNOSIS — E11.65 UNCONTROLLED TYPE 2 DIABETES MELLITUS WITH HYPERGLYCEMIA: Primary | ICD-10-CM

## 2022-06-24 DIAGNOSIS — R01.1 HEART MURMUR: ICD-10-CM

## 2022-06-24 LAB
ALBUMIN SERPL-MCNC: 4.1 G/DL (ref 3.5–5.2)
ALBUMIN/GLOB SERPL: 1.4 G/DL
ALP SERPL-CCNC: 72 U/L (ref 39–117)
ALT SERPL W P-5'-P-CCNC: 15 U/L (ref 1–33)
ANION GAP SERPL CALCULATED.3IONS-SCNC: 7.3 MMOL/L (ref 5–15)
AST SERPL-CCNC: 16 U/L (ref 1–32)
BASOPHILS # BLD AUTO: 0.05 10*3/MM3 (ref 0–0.2)
BASOPHILS NFR BLD AUTO: 0.5 % (ref 0–1.5)
BILIRUB SERPL-MCNC: 0.4 MG/DL (ref 0–1.2)
BUN SERPL-MCNC: 15 MG/DL (ref 8–23)
BUN/CREAT SERPL: 13 (ref 7–25)
CALCIUM SPEC-SCNC: 9.1 MG/DL (ref 8.6–10.5)
CHLORIDE SERPL-SCNC: 106 MMOL/L (ref 98–107)
CHOLEST SERPL-MCNC: 135 MG/DL (ref 0–200)
CO2 SERPL-SCNC: 27.7 MMOL/L (ref 22–29)
CREAT SERPL-MCNC: 1.15 MG/DL (ref 0.57–1)
DEPRECATED RDW RBC AUTO: 46.3 FL (ref 37–54)
EGFRCR SERPLBLD CKD-EPI 2021: 49.5 ML/MIN/1.73
EOSINOPHIL # BLD AUTO: 0.29 10*3/MM3 (ref 0–0.4)
EOSINOPHIL NFR BLD AUTO: 2.9 % (ref 0.3–6.2)
ERYTHROCYTE [DISTWIDTH] IN BLOOD BY AUTOMATED COUNT: 13.7 % (ref 12.3–15.4)
GLOBULIN UR ELPH-MCNC: 3 GM/DL
GLUCOSE SERPL-MCNC: 142 MG/DL (ref 65–99)
HBA1C MFR BLD: 6.9 % (ref 4.8–5.6)
HCT VFR BLD AUTO: 44.6 % (ref 34–46.6)
HDLC SERPL-MCNC: 51 MG/DL (ref 40–60)
HGB BLD-MCNC: 13.7 G/DL (ref 12–15.9)
IMM GRANULOCYTES # BLD AUTO: 0.11 10*3/MM3 (ref 0–0.05)
IMM GRANULOCYTES NFR BLD AUTO: 1.1 % (ref 0–0.5)
LDLC SERPL CALC-MCNC: 58 MG/DL (ref 0–100)
LDLC/HDLC SERPL: 1.05 {RATIO}
LYMPHOCYTES # BLD AUTO: 1.41 10*3/MM3 (ref 0.7–3.1)
LYMPHOCYTES NFR BLD AUTO: 14.3 % (ref 19.6–45.3)
MAGNESIUM SERPL-MCNC: 2 MG/DL (ref 1.6–2.4)
MCH RBC QN AUTO: 28.3 PG (ref 26.6–33)
MCHC RBC AUTO-ENTMCNC: 30.7 G/DL (ref 31.5–35.7)
MCV RBC AUTO: 92.1 FL (ref 79–97)
MONOCYTES # BLD AUTO: 0.74 10*3/MM3 (ref 0.1–0.9)
MONOCYTES NFR BLD AUTO: 7.5 % (ref 5–12)
NEUTROPHILS NFR BLD AUTO: 7.28 10*3/MM3 (ref 1.7–7)
NEUTROPHILS NFR BLD AUTO: 73.7 % (ref 42.7–76)
PLATELET # BLD AUTO: 316 10*3/MM3 (ref 140–450)
PMV BLD AUTO: 9.9 FL (ref 6–12)
POTASSIUM SERPL-SCNC: 4.4 MMOL/L (ref 3.5–5.2)
PROT SERPL-MCNC: 7.1 G/DL (ref 6–8.5)
RBC # BLD AUTO: 4.84 10*6/MM3 (ref 3.77–5.28)
SODIUM SERPL-SCNC: 141 MMOL/L (ref 136–145)
T4 FREE SERPL-MCNC: 1.17 NG/DL (ref 0.93–1.7)
TRIGL SERPL-MCNC: 151 MG/DL (ref 0–150)
TSH SERPL DL<=0.05 MIU/L-ACNC: 2.74 UIU/ML (ref 0.27–4.2)
VLDLC SERPL-MCNC: 26 MG/DL (ref 5–40)
WBC NRBC COR # BLD: 9.88 10*3/MM3 (ref 3.4–10.8)

## 2022-06-24 PROCEDURE — 80053 COMPREHEN METABOLIC PANEL: CPT

## 2022-06-24 PROCEDURE — 84443 ASSAY THYROID STIM HORMONE: CPT

## 2022-06-24 PROCEDURE — 84439 ASSAY OF FREE THYROXINE: CPT

## 2022-06-24 PROCEDURE — 77067 SCR MAMMO BI INCL CAD: CPT

## 2022-06-24 PROCEDURE — 83036 HEMOGLOBIN GLYCOSYLATED A1C: CPT

## 2022-06-24 PROCEDURE — 77080 DXA BONE DENSITY AXIAL: CPT

## 2022-06-24 PROCEDURE — 36415 COLL VENOUS BLD VENIPUNCTURE: CPT

## 2022-06-24 PROCEDURE — 77063 BREAST TOMOSYNTHESIS BI: CPT

## 2022-06-24 PROCEDURE — 80061 LIPID PANEL: CPT

## 2022-06-24 PROCEDURE — 83735 ASSAY OF MAGNESIUM: CPT

## 2022-06-24 PROCEDURE — 85025 COMPLETE CBC W/AUTO DIFF WBC: CPT

## 2022-06-24 NOTE — TELEPHONE ENCOUNTER
Med refill request from my chart message from pt, called pt to confirm, she would like a refill of   Farxiga 5mg- take one tablet once daily  Pt did report she is still having some unpleasant side effects/low BG's at time with this. Pt said she will talk with you more next apt 8/5/22, if she needs us before then I told her to call the office or to send a my chart message

## 2022-06-25 RX ORDER — GLIPIZIDE 5 MG/1
5 TABLET ORAL DAILY
Qty: 30 TABLET | Refills: 5 | Status: SHIPPED | OUTPATIENT
Start: 2022-06-25 | End: 2022-08-08 | Stop reason: SDUPTHER

## 2022-06-30 ENCOUNTER — PATIENT MESSAGE (OUTPATIENT)
Dept: FAMILY MEDICINE CLINIC | Age: 77
End: 2022-06-30

## 2022-07-06 DIAGNOSIS — N18.32 STAGE 3B CHRONIC KIDNEY DISEASE: Primary | ICD-10-CM

## 2022-07-11 ENCOUNTER — TELEPHONE (OUTPATIENT)
Dept: FAMILY MEDICINE CLINIC | Age: 77
End: 2022-07-11

## 2022-07-11 NOTE — TELEPHONE ENCOUNTER
Pt due for Prolia 07/26/22    Last Dexa -2.3  DEXA Bone Density Axial (06/24/2022 09:47)    Last CMP- Calcium 9.1  Comprehensive Metabolic Panel (06/24/2022 09:55)    Last office Visit   Office Visit with Soniya Gaspar APRN (06/17/2022)  Telemedicine with Mata Love MD (06/03/2022)

## 2022-07-13 ENCOUNTER — APPOINTMENT (OUTPATIENT)
Dept: MAMMOGRAPHY | Facility: HOSPITAL | Age: 77
End: 2022-07-13

## 2022-07-14 ENCOUNTER — TELEMEDICINE (OUTPATIENT)
Dept: FAMILY MEDICINE CLINIC | Age: 77
End: 2022-07-14

## 2022-07-14 DIAGNOSIS — Z00.00 MEDICARE ANNUAL WELLNESS VISIT, SUBSEQUENT: Primary | ICD-10-CM

## 2022-07-14 PROCEDURE — 1126F AMNT PAIN NOTED NONE PRSNT: CPT | Performed by: FAMILY MEDICINE

## 2022-07-14 PROCEDURE — G0439 PPPS, SUBSEQ VISIT: HCPCS | Performed by: FAMILY MEDICINE

## 2022-07-14 PROCEDURE — 1159F MED LIST DOCD IN RCRD: CPT | Performed by: FAMILY MEDICINE

## 2022-07-14 PROCEDURE — 1170F FXNL STATUS ASSESSED: CPT | Performed by: FAMILY MEDICINE

## 2022-07-14 NOTE — PROGRESS NOTES
The ABCs of the Annual Wellness Visit  Initial Medicare Wellness Visit    Chief Complaint   Patient presents with   • Medicare Wellness-subsequent     Dox video [phone]     Subjective   History of Present Illness:  Jose Morejon is a 76 y.o. female who presents for an Initial Medicare Wellness Visit.  PRESENT:  JOSE MOREJON IN HER HOME, MATA LOVE MD IN HIS HOME  THIS PATIENT GIVES HER PERMISSION TO RECEIVE HER CARE VIA A VIDEO VISIT TODAY   The following portions of the patient's history were reviewed and   updated as appropriate: .     Compared to one year ago, the patient feels her physical   health is the same.    Compared to one year ago, the patient feels her mental   health is the same.    Recent Hospitalizations:  She was not admitted to the hospital during the last year.       Current Medical Providers:  Patient Care Team:  Mata Love MD as PCP - General (Family Medicine)  Rory Paredes MD as Consulting Physician (Urology)  Annabelle Buckley APRN as Nurse Practitioner (Endocrinology)  Nohemi Mcdonald RegSched Rep as Medical Assistant  Efrem Ventura MD as Consulting Physician (Cardiology)  Tessa Rg MD as Consulting Physician (Nephrology)    Outpatient Medications Prior to Visit   Medication Sig Dispense Refill   • aspirin (aspirin) 81 MG EC tablet      • Calcium Carbonate 1500 (600 Ca) MG tablet 2 (Two) Times a Day With Meals.     • cetirizine (zyrTEC) 10 MG tablet Take 10 mg by mouth Daily As Needed.     • Cholecalciferol (Vitamin D3) 50 MCG (2000 UT) tablet Take 2,000 Units by mouth Daily.     • denosumab (XGEVA) 120 MG/1.7ML solution injection Inject  under the skin into the appropriate area as directed 1 (One) Time.     • estradiol (ESTRACE) 0.1 MG/GM vaginal cream INSERT 2 GRAMS VAGINALLY 2 TIMES A WEEK 42.5 g 3   • ezetimibe (ZETIA) 10 MG tablet Take 1 tablet by mouth Daily. 90 tablet 1   • FeroSul 325 (65 Fe) MG tablet TAKE 1 TABLET BY MOUTH 2 TIMES DAILY  (Patient taking differently: Take 325 mg by mouth Every Other Day.) 60 tablet 3   • fluticasone (FLONASE) 50 MCG/ACT nasal spray INSTILL 2 SPRAYS INTO EACH NOSTRIL EVERY DAY AS DIRECTED 16 g 2   • glipizide (GLUCOTROL) 5 MG tablet Take 1 tablet by mouth Daily. 30 tablet 5   • Glucose Blood (ACCU-CHEK GUIDE VI) by In Vitro route.     • glucose blood test strip 1 each by Other route 2 (Two) Times a Day. Check blood sugar bid 100 each 11   • Omega-3 1000 MG capsule Take 1,000 mg by mouth Daily.     • omeprazole (priLOSEC) 20 MG capsule Take 20 mg by mouth Daily.     • rosuvastatin (CRESTOR) 10 MG tablet Take 1 tablet by mouth Every Night. 90 tablet 1   • albuterol sulfate  (90 Base) MCG/ACT inhaler Inhale 2 puffs Every 4 (Four) Hours As Needed for Shortness of Air. 18 g 0   • dapagliflozin (FARXIGA) 5 MG tablet tablet Take 1 tablet by mouth Daily. Take 1 (one) 5mg tablet once daily 30 tablet 3   • methylPREDNISolone (MEDROL) 4 MG dose pack Take as directed on package instructions. 21 each 0   • promethazine-dextromethorphan (PROMETHAZINE-DM) 6.25-15 MG/5ML syrup Take 5 mL by mouth 4 (Four) Times a Day As Needed for Cough. 118 mL 0     Facility-Administered Medications Prior to Visit   Medication Dose Route Frequency Provider Last Rate Last Admin   • denosumab (PROLIA) syringe 60 mg  60 mg Subcutaneous Q6 Months Mata Love MD   60 mg at 01/26/22 1108       No opioid medication identified on active medication list. I have reviewed chart for other potential  high risk medication/s and harmful drug interactions in the elderly.          Aspirin is on active medication list. Aspirin use is indicated based on review of current medical condition/s. Pros and cons of this therapy have been discussed today. Benefits of this medication outweigh potential harm.  Patient has been encouraged to continue taking this medication.  .      Patient Active Problem List   Diagnosis   • Postmenopausal atrophic vaginitis  "  • Agenesis of right kidney   • Type 2 diabetes mellitus without complication (HCC)   • Hiatal hernia with GERD without esophagitis   • High cholesterol   • Postmenopausal osteoporosis   • Other allergic rhinitis   • Palpitations   • Heart murmur (neg ECHO in 2021)    • Stage 3b chronic kidney disease (HCC)   • Medicare annual wellness visit, subsequent     Advance Care Planning  Advance Directive is not on file.  ACP discussion was held with the patient during this visit. Patient does not have an advance directive, information provided.    Review of Systems   Constitutional: Negative for activity change, appetite change, chills, fatigue and fever.   HENT: Negative for congestion, ear pain, hearing loss, rhinorrhea and sore throat.    Eyes: Negative for discharge and visual disturbance.   Respiratory: Negative for cough and shortness of breath.    Cardiovascular: Negative for chest pain, palpitations and leg swelling.   Gastrointestinal: Negative for abdominal pain, diarrhea, nausea and vomiting.   Genitourinary: Negative for dysuria and hematuria.   Musculoskeletal: Negative for arthralgias and myalgias.   Psychiatric/Behavioral: Negative for dysphoric mood.        Objective       Vitals:    07/14/22 1247   PainSc: 0-No pain     Estimated body mass index is 24.23 kg/m² as calculated from the following:    Height as of 6/17/22: 157.5 cm (62\").    Weight as of 6/17/22: 60.1 kg (132 lb 8 oz).    BMI is within normal parameters. No other follow-up for BMI required.      Does the patient have evidence of cognitive impairment? No    Physical Exam  Vitals and nursing note reviewed.   Constitutional:       General: She is not in acute distress.     Appearance: Normal appearance.   Pulmonary:      Effort: Pulmonary effort is normal.   Musculoskeletal:      Right lower leg: No edema.      Left lower leg: No edema.   Neurological:      General: No focal deficit present.      Mental Status: She is alert.      Cranial Nerves: " No cranial nerve deficit.      Coordination: Coordination normal.      Gait: Gait normal.   Psychiatric:         Mood and Affect: Mood normal.         Behavior: Behavior normal.       Lab Results   Component Value Date    TRIG 151 (H) 06/24/2022    HDL 51 06/24/2022    LDL 58 06/24/2022    VLDL 26 06/24/2022    HGBA1C 6.90 (H) 06/24/2022          HEALTH RISK ASSESSMENT    Smoking Status:  Social History     Tobacco Use   Smoking Status Never Smoker   Smokeless Tobacco Never Used     Alcohol Consumption:  Social History     Substance and Sexual Activity   Alcohol Use Never     Fall Risk Screen:    [facility].    Depression Screen:   PHQ-2/PHQ-9 Depression Screening 7/14/2022   Retired PHQ-9 Total Score -   Retired Total Score -   Little Interest or Pleasure in Doing Things 0-->not at all   Feeling Down, Depressed or Hopeless 0-->not at all   PHQ-9: Brief Depression Severity Measure Score 0       Health Habits and Functional and Cognitive Screening:  Functional & Cognitive Status 7/14/2022   Do you have difficulty preparing food and eating? No   Do you have difficulty bathing yourself, getting dressed or grooming yourself? No   Do you have difficulty using the toilet? No   Do you have difficulty moving around from place to place? No   Do you have trouble with steps or getting out of a bed or a chair? No   Current Diet Well Balanced Diet   Dental Exam Up to date   Eye Exam Up to date   Exercise (times per week) 2 times per week   Current Exercises Include Walking;House Cleaning   Do you need help using the phone?  No   Are you deaf or do you have serious difficulty hearing?  No   Do you need help with transportation? No   Do you need help shopping? No   Do you need help preparing meals?  No   Do you need help with housework?  No   Do you need help with laundry? No   Do you need help taking your medications? No   Do you need help managing money? No   Do you ever drive or ride in a  car without wearing a seat belt? No   Have you felt unusual stress, anger or loneliness in the last month? No   Who do you live with? Spouse   If you need help, do you have trouble finding someone available to you? No   Do you have difficulty concentrating, remembering or making decisions? No       Age-appropriate Screening Schedule:  Refer to the list below for future screening recommendations based on patient's age, sex and/or medical conditions. Orders for these recommended tests are listed in the plan section. The patient has been provided with a written plan.    Health Maintenance   Topic Date Due   • ZOSTER VACCINE (1 of 2) 07/14/2022 (Originally 10/24/1995)   • TDAP/TD VACCINES (1 - Tdap) 07/14/2023 (Originally 10/24/1964)   • DIABETIC EYE EXAM  09/17/2022   • INFLUENZA VACCINE  10/01/2022   • URINE MICROALBUMIN  11/22/2022   • HEMOGLOBIN A1C  12/24/2022   • LIPID PANEL  06/24/2023   • DXA SCAN  06/24/2024            Assessment & Plan   CMS Preventative Services Quick Reference  Risk Factors Identified During Encounter  Cardiovascular Disease  Dementia/Memory   Depression/Dysphoria  Fall Risk-High or Moderate  Glaucoma or Family History of Glaucoma  Immunizations Discussed/Encouraged (specific Immunizations; Prevnar 20 (Pneumococcal 20-valent conjugate)  The above risks/problems have been discussed with the patient.  Follow up actions/plans if indicated are seen below in the Assessment/Plan Section.  Pertinent information has been shared with the patient in the After Visit Summary.    Diagnoses and all orders for this visit:    1. Medicare annual wellness visit, subsequent (Primary)  Assessment & Plan:  ADVICE GIVEN RE:  SEATBELT USE, ALCOHOL USE, HEALTHY DIET, ROUTINE EYE AND DENTAL EXAM, ROUTINE VACCINATIONS, SHE WILL STOP BY FOR A PREVNAR 20 AT HER CONVENIENCE         Follow Up:  No follow-ups on file.     An After Visit Summary and PPPS were made available to the patient.

## 2022-07-14 NOTE — ASSESSMENT & PLAN NOTE
ADVICE GIVEN RE:  SEATBELT USE, ALCOHOL USE, HEALTHY DIET, ROUTINE EYE AND DENTAL EXAM, ROUTINE VACCINATIONS, SHE WILL STOP BY FOR A PREVNAR 20 AT HER CONVENIENCE

## 2022-07-18 ENCOUNTER — LAB (OUTPATIENT)
Dept: LAB | Facility: HOSPITAL | Age: 77
End: 2022-07-18

## 2022-07-18 ENCOUNTER — TRANSCRIBE ORDERS (OUTPATIENT)
Dept: FAMILY MEDICINE CLINIC | Age: 77
End: 2022-07-18

## 2022-07-18 ENCOUNTER — HOSPITAL ENCOUNTER (OUTPATIENT)
Dept: GENERAL RADIOLOGY | Facility: HOSPITAL | Age: 77
Discharge: HOME | End: 2022-07-18

## 2022-07-18 ENCOUNTER — OFFICE VISIT (OUTPATIENT)
Dept: FAMILY MEDICINE CLINIC | Age: 77
End: 2022-07-18

## 2022-07-18 VITALS
HEIGHT: 62 IN | WEIGHT: 134.4 LBS | SYSTOLIC BLOOD PRESSURE: 125 MMHG | OXYGEN SATURATION: 98 % | HEART RATE: 101 BPM | DIASTOLIC BLOOD PRESSURE: 86 MMHG | BODY MASS INDEX: 24.73 KG/M2 | TEMPERATURE: 98.5 F

## 2022-07-18 DIAGNOSIS — E11.9 TYPE 2 DIABETES MELLITUS WITHOUT COMPLICATION, WITHOUT LONG-TERM CURRENT USE OF INSULIN: ICD-10-CM

## 2022-07-18 DIAGNOSIS — K44.9 HIATAL HERNIA WITH GERD WITHOUT ESOPHAGITIS: ICD-10-CM

## 2022-07-18 DIAGNOSIS — K21.9 HIATAL HERNIA WITH GERD WITHOUT ESOPHAGITIS: ICD-10-CM

## 2022-07-18 DIAGNOSIS — E78.00 HIGH CHOLESTEROL: Primary | ICD-10-CM

## 2022-07-18 DIAGNOSIS — U07.1 COVID-19: Primary | ICD-10-CM

## 2022-07-18 DIAGNOSIS — N39.0 URINARY TRACT INFECTION WITHOUT HEMATURIA, SITE UNSPECIFIED: ICD-10-CM

## 2022-07-18 DIAGNOSIS — Z86.16 HISTORY OF COVID-19: ICD-10-CM

## 2022-07-18 DIAGNOSIS — R05.9 COUGH: ICD-10-CM

## 2022-07-18 DIAGNOSIS — R05.9 COUGH: Primary | ICD-10-CM

## 2022-07-18 DIAGNOSIS — N39.0 URINARY TRACT INFECTION WITHOUT HEMATURIA, SITE UNSPECIFIED: Primary | ICD-10-CM

## 2022-07-18 LAB
BACTERIA UR QL AUTO: ABNORMAL /HPF
BILIRUB UR QL STRIP: NEGATIVE
CLARITY UR: CLEAR
COLOR UR: YELLOW
GLUCOSE UR STRIP-MCNC: NEGATIVE MG/DL
HGB UR QL STRIP.AUTO: ABNORMAL
KETONES UR QL STRIP: NEGATIVE
LEUKOCYTE ESTERASE UR QL STRIP.AUTO: NEGATIVE
NITRITE UR QL STRIP: NEGATIVE
PH UR STRIP.AUTO: 6 [PH] (ref 5–8)
PROT UR QL STRIP: NEGATIVE
RBC # UR STRIP: ABNORMAL /HPF
REF LAB TEST METHOD: ABNORMAL
SP GR UR STRIP: 1.01 (ref 1–1.03)
SQUAMOUS #/AREA URNS HPF: ABNORMAL /HPF
UROBILINOGEN UR QL STRIP: ABNORMAL
WBC # UR STRIP: ABNORMAL /HPF

## 2022-07-18 PROCEDURE — 81001 URINALYSIS AUTO W/SCOPE: CPT

## 2022-07-18 PROCEDURE — 99213 OFFICE O/P EST LOW 20 MIN: CPT

## 2022-07-18 PROCEDURE — 71046 X-RAY EXAM CHEST 2 VIEWS: CPT

## 2022-07-18 PROCEDURE — 87086 URINE CULTURE/COLONY COUNT: CPT | Performed by: NURSE PRACTITIONER

## 2022-07-18 RX ORDER — EZETIMIBE 10 MG/1
10 TABLET ORAL DAILY
Qty: 90 TABLET | Refills: 1 | Status: SHIPPED | OUTPATIENT
Start: 2022-07-18 | End: 2023-01-11

## 2022-07-18 RX ORDER — ROSUVASTATIN CALCIUM 10 MG/1
10 TABLET, COATED ORAL NIGHTLY
Qty: 90 TABLET | Refills: 1 | Status: SHIPPED | OUTPATIENT
Start: 2022-07-18 | End: 2023-01-11

## 2022-07-18 RX ORDER — OMEPRAZOLE 10 MG/1
10 CAPSULE, DELAYED RELEASE ORAL 2 TIMES DAILY
Qty: 90 CAPSULE | Refills: 1 | Status: SHIPPED | OUTPATIENT
Start: 2022-07-18 | End: 2022-07-25

## 2022-07-18 RX ORDER — OMEPRAZOLE 10 MG/1
10 CAPSULE, DELAYED RELEASE ORAL 2 TIMES DAILY
COMMUNITY
End: 2022-07-18 | Stop reason: SDUPTHER

## 2022-07-18 NOTE — TELEPHONE ENCOUNTER
Rx Refill Note  Requested Prescriptions     Pending Prescriptions Disp Refills   • omeprazole (prilOSEC) 10 MG capsule 90 capsule 0     Sig: Take 1 capsule by mouth 2 (Two) Times a Day.   • ezetimibe (ZETIA) 10 MG tablet 90 tablet 1     Sig: Take 1 tablet by mouth Daily.   • rosuvastatin (CRESTOR) 10 MG tablet 90 tablet 1     Sig: Take 1 tablet by mouth Every Night.      Last office visit with prescribing clinician: 07/14/22 telehealth    Next office visit with prescribing clinician: 1/13/2023  Doesn't look like the omeprazole 10mg has been filled here before.       Taylor Adams LPN  07/18/22, 12:46 EDT

## 2022-07-18 NOTE — PROGRESS NOTES
"Chief Complaint  Cough (Mucus, fatigue. (Was Covid+ in June))    Subjective        Dacia Napier presents to [facility]  History of Present Illness     Patient presents today with complaints of cough.  She was positive for COVID on 6-6-2022.  She did not receive any antiviral medication.  She returned to office on 6- due to continued cough and had a chest x-ray completed that showed no acute disease.  She was given a steroid and an albuterol inhaler.  She does report she briefly felt better following the steroids but unfortunately her cough has continued.    She is taking Mucinex DM over-the-counter and reports that it does help somewhat.  She denies shortness of breath or chest pain, she just endorses some congestion in her chest.  She denies fever or chills.  She does endorse some fatigue, states that she gets tired from coughing so much.    Patient completed ciprofloxacin this past Friday that was given per urgent care for UTI    Objective   Vital Signs:  /86 (BP Location: Right arm, Patient Position: Sitting)   Pulse 101   Temp 98.5 °F (36.9 °C) (Oral)   Ht 157.5 cm (62.01\")   Wt 61 kg (134 lb 6.4 oz)   SpO2 98% Comment: room air  BMI 24.58 kg/m²   Estimated body mass index is 24.58 kg/m² as calculated from the following:    Height as of this encounter: 157.5 cm (62.01\").    Weight as of this encounter: 61 kg (134 lb 6.4 oz).    BMI is within normal parameters. No other follow-up for BMI required.    Physical Exam  Vitals and nursing note reviewed.   Constitutional:       General: She is not in acute distress.     Appearance: Normal appearance. She is not ill-appearing.   HENT:      Head: Normocephalic.      Right Ear: Tympanic membrane, ear canal and external ear normal.      Left Ear: Tympanic membrane, ear canal and external ear normal.      Nose: Nose normal.      Mouth/Throat:      Mouth: Mucous membranes are moist.      Pharynx: No posterior " oropharyngeal erythema.   Eyes:      Pupils: Pupils are equal, round, and reactive to light.   Cardiovascular:      Rate and Rhythm: Normal rate and regular rhythm.      Pulses: Normal pulses.   Pulmonary:      Effort: Pulmonary effort is normal. No accessory muscle usage or respiratory distress.      Breath sounds: Normal breath sounds. No wheezing or rhonchi.      Comments: Dry cough noted during exam  Abdominal:      Palpations: Abdomen is soft.   Neurological:      General: No focal deficit present.      Mental Status: She is alert and oriented to person, place, and time.   Psychiatric:         Mood and Affect: Mood normal.         Behavior: Behavior normal.          Assessment and Plan   Diagnoses and all orders for this visit:    1. Cough (Primary)  -     Ambulatory Referral to Pulmonology  -     XR Chest PA & Lateral; Future  -     CT Chest With Contrast; Future    2. History of COVID-19  -     Ambulatory Referral to Pulmonology  -     XR Chest PA & Lateral; Future  -     CT Chest With Contrast; Future      Chest x-ray showed no acute disease, lung sounds are clear.  Spoke with Dr. Love, patient's PCP, regarding case.  Dr. Love requested a CT chest, PE protocol to be completed within the week, order placed.  Patient to continue with Mucinex DM as discussed, encouraged increased fluid intake.  I educated patient that the cough related to COVID can linger for months.  Referral to pulmonology and referral to pulmonary rehab placed.  Patient is to continue to monitor symptoms and for any chest pain or shortness of breath patient is to go to ER.  Return to clinic if symptoms are worsening or not improving.       Follow Up {Instructions Charge Capture  Follow-up Communications :23}  Return if symptoms worsen or fail to improve, for Next scheduled follow up.  Patient was given instructions and counseling regarding her condition or for health maintenance advice. Please see specific information pulled into the  AVS if appropriate.

## 2022-07-19 ENCOUNTER — HOSPITAL ENCOUNTER (OUTPATIENT)
Dept: CT IMAGING | Facility: HOSPITAL | Age: 77
Discharge: HOME | End: 2022-07-19

## 2022-07-19 DIAGNOSIS — Z86.16 HISTORY OF COVID-19: ICD-10-CM

## 2022-07-19 DIAGNOSIS — R05.9 COUGH: ICD-10-CM

## 2022-07-19 LAB — BACTERIA SPEC AEROBE CULT: NO GROWTH

## 2022-07-19 PROCEDURE — 71260 CT THORAX DX C+: CPT

## 2022-07-19 PROCEDURE — 0 IOPAMIDOL PER 1 ML

## 2022-07-19 RX ADMIN — IOPAMIDOL 100 ML: 755 INJECTION, SOLUTION INTRAVENOUS at 10:45

## 2022-07-25 ENCOUNTER — OFFICE VISIT (OUTPATIENT)
Dept: UROLOGY | Facility: CLINIC | Age: 77
End: 2022-07-25

## 2022-07-25 VITALS — HEIGHT: 62 IN | WEIGHT: 133.4 LBS | RESPIRATION RATE: 18 BRPM | BODY MASS INDEX: 24.55 KG/M2

## 2022-07-25 DIAGNOSIS — K21.9 HIATAL HERNIA WITH GERD WITHOUT ESOPHAGITIS: Primary | ICD-10-CM

## 2022-07-25 DIAGNOSIS — J30.89 OTHER ALLERGIC RHINITIS: Primary | ICD-10-CM

## 2022-07-25 DIAGNOSIS — N39.41 URGE INCONTINENCE: ICD-10-CM

## 2022-07-25 DIAGNOSIS — R35.0 URINARY FREQUENCY: ICD-10-CM

## 2022-07-25 DIAGNOSIS — K44.9 HIATAL HERNIA WITH GERD WITHOUT ESOPHAGITIS: Primary | ICD-10-CM

## 2022-07-25 DIAGNOSIS — N39.0 RECURRENT URINARY TRACT INFECTION: Primary | ICD-10-CM

## 2022-07-25 PROCEDURE — 99213 OFFICE O/P EST LOW 20 MIN: CPT | Performed by: NURSE PRACTITIONER

## 2022-07-25 RX ORDER — OMEPRAZOLE 20 MG/1
20 CAPSULE, DELAYED RELEASE ORAL DAILY
Qty: 90 CAPSULE | Refills: 1 | Status: SHIPPED | OUTPATIENT
Start: 2022-07-25

## 2022-07-25 RX ORDER — FLUTICASONE PROPIONATE 50 MCG
SPRAY, SUSPENSION (ML) NASAL
Qty: 16 G | Refills: 3 | Status: SHIPPED | OUTPATIENT
Start: 2022-07-25 | End: 2022-11-16

## 2022-07-25 NOTE — TELEPHONE ENCOUNTER
Pt sent a message through [employer] and said the omeprazole 10mg is incorrect. She takes 20mg daily.   Correction made in her med list. This omeprazole 10mg was requested by Annie in [place].

## 2022-07-25 NOTE — PROGRESS NOTES
Chief Complaint: recurrent urinary tract infections    Subjective         History of Present Illness  Dacia Napier is a 76 y.o. female presents to [facility] to be seen for f/u recurrent UTI.    She states she is with burning and pain with urination as well as urinary frequency and back aches and pressure in lower abdomen.     She is using vaginal estrace just started using again a week ago.    She still continues to have pain and symptoms when she is with negative CX.     She is with new onset urge incontinence and with frequency.     She voids every couple hours.     She had COVID 6/6/22     She drinks just over 48 oz of water a day.     Never been a smoker.     Urine cultures:   7/18/2022 no growth     7/7/2022 10,000-40,000 colony-forming units per milliliter of Klebsiella pneumoniae resistant to ampicillin, intermittent susceptibility to nitrofurantoin    4/26/2022 no growth      4/8/2022 Klebsiella aerogenes greater than 100,000 colony-forming units per milliliter resistant to cefazolin intermediate susceptibility to nitrofurantoin    1/10/2022 no growth    Objective     Past Medical History:   Diagnosis Date   • Arrhythmia    • Colon polyp    • Diabetes mellitus (HCC)    • GERD (gastroesophageal reflux disease)    • Hiatal hernia    • Hyperlipidemia    • Rapid or irregular heartbeat    • Seasonal allergies    • Type 2 diabetes mellitus (HCC)        Past Surgical History:   Procedure Laterality Date   • CATARACT EXTRACTION, BILATERAL     • COLONOSCOPY     • COLONOSCOPY N/A 6/25/2021    Procedure: COLONOSCOPY;  Surgeon: Ty Partida MD;  Location: [facility] ENDOSCOPY;  Service: General;  Laterality: N/A;  Colon polyp   • ENDOSCOPY N/A 6/25/2021    Procedure: ESOPHAGOGASTRODUODENOSCOPY;  Surgeon: Ty Partida MD;  Location: [facility] ENDOSCOPY;  Service: General;  Laterality: N/A;  Hiatal Hernia   • EYE CAPSULOTOMY WITH LASER     • HERNIA REPAIR     • HYSTERECTOMY     • LASIK     •  TONSILLECTOMY           Current Outpatient Medications:   •  aspirin (aspirin) 81 MG EC tablet, , Disp: , Rfl:   •  Calcium Carbonate 1500 (600 Ca) MG tablet, 2 (Two) Times a Day With Meals., Disp: , Rfl:   •  cetirizine (zyrTEC) 10 MG tablet, Take 10 mg by mouth Daily As Needed., Disp: , Rfl:   •  Cholecalciferol (Vitamin D3) 50 MCG (2000 UT) tablet, Take 2,000 Units by mouth Daily., Disp: , Rfl:   •  denosumab (XGEVA) 120 MG/1.7ML solution injection, Inject  under the skin into the appropriate area as directed 1 (One) Time., Disp: , Rfl:   •  estradiol (ESTRACE) 0.1 MG/GM vaginal cream, INSERT 2 GRAMS VAGINALLY 2 TIMES A WEEK, Disp: 42.5 g, Rfl: 3  •  ezetimibe (ZETIA) 10 MG tablet, Take 1 tablet by mouth Daily., Disp: 90 tablet, Rfl: 1  •  FeroSul 325 (65 Fe) MG tablet, TAKE 1 TABLET BY MOUTH 2 TIMES DAILY (Patient taking differently: Take 325 mg by mouth Every Other Day.), Disp: 60 tablet, Rfl: 3  •  fluticasone (FLONASE) 50 MCG/ACT nasal spray, INSTILL 2 SPRAYS INTO EACH NOSTRIL EVERY DAY AS DIRECTED, Disp: 16 g, Rfl: 2  •  glipizide (GLUCOTROL) 5 MG tablet, Take 1 tablet by mouth Daily., Disp: 30 tablet, Rfl: 5  •  Glucose Blood (ACCU-CHEK GUIDE VI), by In Vitro route., Disp: , Rfl:   •  glucose blood test strip, 1 each by Other route 2 (Two) Times a Day. Check blood sugar bid, Disp: 100 each, Rfl: 11  •  Omega-3 1000 MG capsule, Take 1,000 mg by mouth Daily., Disp: , Rfl:   •  omeprazole (priLOSEC) 20 MG capsule, Take 1 capsule by mouth Daily., Disp: 90 capsule, Rfl: 1  •  rosuvastatin (CRESTOR) 10 MG tablet, Take 1 tablet by mouth Every Night., Disp: 90 tablet, Rfl: 1  •  Vibegron 75 MG tablet, Take 1 tablet by mouth Daily., Disp: 90 tablet, Rfl: 3    Current Facility-Administered Medications:   •  denosumab (PROLIA) syringe 60 mg, 60 mg, Subcutaneous, Q6 Months, Mata Love MD, 60 mg at 01/26/22 1100    No Known Allergies     Family History   Problem Relation Age of Onset   • Heart disease  "Mother    • Heart disease Father    • Heart disease Sister    • Heart disease Brother    • Malig Hyperthermia Neg Hx        Social History     Socioeconomic History   • Marital status:    Tobacco Use   • Smoking status: Never Smoker   • Smokeless tobacco: Never Used   Vaping Use   • Vaping Use: Never used   Substance and Sexual Activity   • Alcohol use: Never   • Drug use: Never   • Sexual activity: Defer       Vital Signs:   Resp 18   Ht 157.5 cm (62\")   Wt 60.5 kg (133 lb 6.4 oz)   BMI 24.40 kg/m²      Physical Exam     Result Review :   The following data was reviewed by: JANES Zuñiga on 07/12/2021:  Results for orders placed or performed in visit on 07/18/22   Urinalysis without microscopic (no culture) - Urine, Clean Catch    Specimen: Urine, Clean Catch   Result Value Ref Range    Color, UA Yellow Yellow, Straw    Appearance, UA Clear Clear    pH, UA 6.0 5.0 - 8.0    Specific Gravity, UA 1.010 1.005 - 1.030    Glucose, UA Negative Negative    Ketones, UA Negative Negative    Bilirubin, UA Negative Negative    Blood, UA Trace (A) Negative    Protein, UA Negative Negative    Leuk Esterase, UA Negative Negative    Nitrite, UA Negative Negative    Urobilinogen, UA 0.2 E.U./dL 0.2 - 1.0 E.U./dL   Urinalysis, Microscopic Only - Urine, Clean Catch    Specimen: Urine, Clean Catch   Result Value Ref Range    RBC, UA 0-2 (A) None Seen /HPF    WBC, UA None Seen None Seen /HPF    Bacteria, UA None Seen None Seen /HPF    Squamous Epithelial Cells, UA None Seen None Seen, 0-2 /HPF    Methodology Manual Light Microscopy             Procedures        Assessment and Plan    Diagnoses and all orders for this visit:    1. Recurrent urinary tract infection (Primary)  -     Cystoscopy; Future    2. Urinary frequency  -     Vibegron 75 MG tablet; Take 1 tablet by mouth Daily.  Dispense: 90 tablet; Refill: 3  -     Cystoscopy; Future    3. Urge incontinence  -     Vibegron 75 MG tablet; Take 1 tablet by mouth " Daily.  Dispense: 90 tablet; Refill: 3      We will try patient on gemtesa for urgency and frequency with urination to see if this helps.     We will also schedule for cysto given rapid onset of symptoms.     She will continue estrace.    Call with any S/S of UTI.     I spent 20 minutes caring for Dacia on this date of service. This time includes time spent by me in the following activities:reviewing tests, obtaining and/or reviewing a separately obtained history, performing a medically appropriate examination and/or evaluation , counseling and educating the patient/family/caregiver, ordering medications, tests, or procedures, and documenting information in the medical record  Follow Up   Return for With Dr. Paredes For Cystoscopy.  Patient was given instructions and counseling regarding her condition or for health maintenance advice. Please see specific information pulled into the AVS if appropriate.

## 2022-07-25 NOTE — TELEPHONE ENCOUNTER
Rx Refill Note  Requested Prescriptions     Pending Prescriptions Disp Refills   • fluticasone (FLONASE) 50 MCG/ACT nasal spray 16 g 3     Sig: INSTILL 2 SPRAYS INTO EACH NOSTRIL EVERY DAY AS DIRECTED      Last office visit with prescribing clinician: 9/1/2021      Next office visit with prescribing clinician: 1/13/2023    Taylor Adams LPN  07/25/22, 15:57 EDT

## 2022-07-25 NOTE — TELEPHONE ENCOUNTER
Pt sent a message through [employer] and said the omeprazole 10mg is incorrect. She takes 20mg daily.   Correction made in her med list. This omeprazole 10mg was requested by Annie in [place].  Resent as 20mg.

## 2022-07-28 ENCOUNTER — OFFICE VISIT (OUTPATIENT)
Dept: PULMONOLOGY | Facility: CLINIC | Age: 77
End: 2022-07-28

## 2022-07-28 VITALS
SYSTOLIC BLOOD PRESSURE: 108 MMHG | OXYGEN SATURATION: 94 % | TEMPERATURE: 97.5 F | RESPIRATION RATE: 18 BRPM | DIASTOLIC BLOOD PRESSURE: 70 MMHG | HEIGHT: 62 IN | WEIGHT: 128 LBS | BODY MASS INDEX: 23.55 KG/M2 | HEART RATE: 96 BPM

## 2022-07-28 DIAGNOSIS — J20.9 ACUTE BRONCHITIS, UNSPECIFIED ORGANISM: Primary | ICD-10-CM

## 2022-07-28 DIAGNOSIS — R05.9 COUGH: ICD-10-CM

## 2022-07-28 DIAGNOSIS — IMO0001 LUNG NODULE < 6CM ON CT: ICD-10-CM

## 2022-07-28 DIAGNOSIS — U09.9 LONG COVID: ICD-10-CM

## 2022-07-28 PROCEDURE — 99203 OFFICE O/P NEW LOW 30 MIN: CPT | Performed by: INTERNAL MEDICINE

## 2022-07-28 RX ORDER — AZITHROMYCIN 250 MG/1
TABLET, FILM COATED ORAL
Qty: 6 TABLET | Refills: 0 | Status: SHIPPED | OUTPATIENT
Start: 2022-07-28 | End: 2022-07-31 | Stop reason: SDUPTHER

## 2022-07-28 RX ORDER — ALBUTEROL SULFATE 90 UG/1
2 AEROSOL, METERED RESPIRATORY (INHALATION) EVERY 4 HOURS PRN
Qty: 1 G | Refills: 3 | Status: SHIPPED | OUTPATIENT
Start: 2022-07-28 | End: 2022-12-06

## 2022-07-28 RX ORDER — PREDNISONE 20 MG/1
40 TABLET ORAL DAILY
Qty: 14 TABLET | Refills: 0 | Status: SHIPPED | OUTPATIENT
Start: 2022-07-28 | End: 2022-07-31 | Stop reason: SDUPTHER

## 2022-07-28 NOTE — PROGRESS NOTES
Pulmonary Consultation    Brittany Smith APRN,    Thank you for asking me to see Dacia Napier for   Chief Complaint   Patient presents with   • Establish Care     New Patient    • Cough   • Persisting COVID symptoms    • Shortness of Breath     While walking    .      History of Present Illness  Dacia Napier is a 76 y.o. female with a PMH significant for COVID-19 presents with persistent cough and shortness of breath with chest congestion over the past 3 to 4 weeks patient denies any hemoptysis leg pain or swelling       Tobacco use history:  Never smoker      Review of Systems: History obtained from chart review and the patient.  Review of Systems   Respiratory: Positive for cough and shortness of breath.    All other systems reviewed and are negative.    As described in the HPI. Otherwise, remainder of ROS (14 systems) were negative.    Patient Active Problem List   Diagnosis   • Postmenopausal atrophic vaginitis   • Agenesis of right kidney   • Type 2 diabetes mellitus without complication (HCC)   • Hiatal hernia with GERD without esophagitis   • High cholesterol   • Postmenopausal osteoporosis   • Other allergic rhinitis   • Palpitations   • Heart murmur (neg ECHO in 2021)    • Stage 3b chronic kidney disease (HCC)   • Medicare annual wellness visit, subsequent         Current Outpatient Medications:   •  aspirin (aspirin) 81 MG EC tablet, , Disp: , Rfl:   •  Calcium Carbonate 1500 (600 Ca) MG tablet, 2 (Two) Times a Day With Meals., Disp: , Rfl:   •  cetirizine (zyrTEC) 10 MG tablet, Take 10 mg by mouth Daily As Needed., Disp: , Rfl:   •  Cholecalciferol (Vitamin D3) 50 MCG (2000 UT) tablet, Take 2,000 Units by mouth Daily., Disp: , Rfl:   •  denosumab (XGEVA) 120 MG/1.7ML solution injection, Inject  under the skin into the appropriate area as directed 1 (One) Time., Disp: , Rfl:   •  estradiol (ESTRACE) 0.1 MG/GM vaginal cream, INSERT 2 GRAMS VAGINALLY 2 TIMES A WEEK, Disp: 42.5 g, Rfl: 3  •  ezetimibe  (ZETIA) 10 MG tablet, Take 1 tablet by mouth Daily., Disp: 90 tablet, Rfl: 1  •  FeroSul 325 (65 Fe) MG tablet, TAKE 1 TABLET BY MOUTH 2 TIMES DAILY (Patient taking differently: Take 325 mg by mouth Every Other Day.), Disp: 60 tablet, Rfl: 3  •  fluticasone (FLONASE) 50 MCG/ACT nasal spray, INSTILL 2 SPRAYS INTO EACH NOSTRIL EVERY DAY AS DIRECTED, Disp: 16 g, Rfl: 3  •  glipizide (GLUCOTROL) 5 MG tablet, Take 1 tablet by mouth Daily., Disp: 30 tablet, Rfl: 5  •  Glucose Blood (ACCU-CHEK GUIDE VI), by In Vitro route., Disp: , Rfl:   •  glucose blood test strip, 1 each by Other route 2 (Two) Times a Day. Check blood sugar bid, Disp: 100 each, Rfl: 11  •  Omega-3 1000 MG capsule, Take 1,000 mg by mouth Daily., Disp: , Rfl:   •  omeprazole (priLOSEC) 20 MG capsule, Take 1 capsule by mouth Daily., Disp: 90 capsule, Rfl: 1  •  rosuvastatin (CRESTOR) 10 MG tablet, Take 1 tablet by mouth Every Night., Disp: 90 tablet, Rfl: 1  •  Vibegron 75 MG tablet, Take 1 tablet by mouth Daily., Disp: 90 tablet, Rfl: 3  •  albuterol sulfate HFA (Ventolin HFA) 108 (90 Base) MCG/ACT inhaler, Inhale 2 puffs Every 4 (Four) Hours As Needed for Wheezing or Shortness of Air., Disp: 1 g, Rfl: 3  •  azithromycin (ZITHROMAX) 250 MG tablet, Take 2 by mouth today then 1 daily for 4 days, Disp: 6 tablet, Rfl: 0  •  predniSONE (DELTASONE) 20 MG tablet, Take 2 tablets by mouth Daily., Disp: 14 tablet, Rfl: 0    Current Facility-Administered Medications:   •  denosumab (PROLIA) syringe 60 mg, 60 mg, Subcutaneous, Q6 Months, Mata Love MD, 60 mg at 01/26/22 1108    No Known Allergies    Past Medical History:   Diagnosis Date   • Anemia 06/2021    Iron prescription   • Arrhythmia    • Colon polyp    • Diabetes mellitus (HCC)    • GERD (gastroesophageal reflux disease)    • Hiatal hernia    • Hyperlipidemia    • Lung nodule 7/2022    On my ct scan at [facility]   • Rapid or irregular heartbeat    • Seasonal allergies    • Type 2 diabetes  "mellitus (HCC)      Past Surgical History:   Procedure Laterality Date   • CARDIAC CATHETERIZATION     • CATARACT EXTRACTION, BILATERAL     • COLONOSCOPY     • COLONOSCOPY N/A 06/25/2021    Procedure: COLONOSCOPY;  Surgeon: Ty Partida MD;  Location: [facility] ENDOSCOPY;  Service: General;  Laterality: N/A;  Colon polyp   • ENDOSCOPY N/A 06/25/2021    Procedure: ESOPHAGOGASTRODUODENOSCOPY;  Surgeon: Ty Partida MD;  Location: [facility] ENDOSCOPY;  Service: General;  Laterality: N/A;  Hiatal Hernia   • EYE CAPSULOTOMY WITH LASER     • HERNIA REPAIR     • HYSTERECTOMY     • LASIK     • TONSILLECTOMY       Social History     Socioeconomic History   • Marital status:    Tobacco Use   • Smoking status: Never Smoker   • Smokeless tobacco: Never Used   Vaping Use   • Vaping Use: Never used   Substance and Sexual Activity   • Alcohol use: Not Currently     Comment: Maybe couple times year   • Drug use: Never   • Sexual activity: Not Currently     Partners: Male     Birth control/protection: Condom, Pill, Diaphragm, Spermicide     Family History   Problem Relation Age of Onset   • Heart disease Mother    • Heart failure Mother         Congestive heart failure   • Heart disease Father    • Heart failure Father         3 heart attacks in 6 years   • Heart disease Sister    • Cancer Sister         Breast cancer   • Heart disease Brother    • Cancer Brother         Tongue cancer   • Heart failure Brother         Pace maker defibrillator and stents at 40   • Cancer Sister         Melanoma skin cancer   • Heart failure Brother         By pass   • Heart failure Sister         5 by pass   • Malig Hyperthermia Neg Hx           Objective     Blood pressure 108/70, pulse 96, temperature 97.5 °F (36.4 °C), temperature source Temporal, resp. rate 18, height 157.5 cm (62\"), weight 58.1 kg (128 lb), SpO2 94 %.  Physical Exam  Vitals and nursing note reviewed.   Constitutional:       Appearance: Normal appearance.   HENT:      " Head: Normocephalic and atraumatic.      Nose: Nose normal.      Mouth/Throat:      Mouth: Mucous membranes are moist.      Pharynx: Oropharynx is clear.   Eyes:      Extraocular Movements: Extraocular movements intact.      Conjunctiva/sclera: Conjunctivae normal.      Pupils: Pupils are equal, round, and reactive to light.   Cardiovascular:      Rate and Rhythm: Normal rate and regular rhythm.      Pulses: Normal pulses.      Heart sounds: Normal heart sounds.   Pulmonary:      Effort: Pulmonary effort is normal.      Breath sounds: Normal breath sounds.   Abdominal:      General: Abdomen is flat. Bowel sounds are normal.      Palpations: Abdomen is soft.   Musculoskeletal:         General: Normal range of motion.      Cervical back: Normal range of motion and neck supple.   Skin:     General: Skin is warm.      Capillary Refill: Capillary refill takes 2 to 3 seconds.   Neurological:      General: No focal deficit present.      Mental Status: She is alert and oriented to person, place, and time.   Psychiatric:         Mood and Affect: Mood normal.         Behavior: Behavior normal.       Immunization History   Administered Date(s) Administered   • COVID-19 (CHARLINE) 03/24/2021   • Fluzone High Dose =>65 Years ([employer] ONLY) 10/24/2017, 10/16/2019, 10/04/2020, 10/22/2021   • Fluzone High-Dose 65+yrs 10/04/2020, 10/22/2021            Assessment & Plan     Diagnoses and all orders for this visit:    1. Acute bronchitis, unspecified organism (Primary)  -     Full Pulmonary Function Test With Bronchodilator; Future    2. Cough  -     Full Pulmonary Function Test With Bronchodilator; Future    3. Long COVID  -     Full Pulmonary Function Test With Bronchodilator; Future    Other orders  -     azithromycin (ZITHROMAX) 250 MG tablet; Take 2 by mouth today then 1 daily for 4 days  Dispense: 6 tablet; Refill: 0  -     predniSONE (DELTASONE) 20 MG tablet; Take 2 tablets by mouth Daily.  Dispense: 14 tablet; Refill: 0  -      albuterol sulfate HFA (Ventolin HFA) 108 (90 Base) MCG/ACT inhaler; Inhale 2 puffs Every 4 (Four) Hours As Needed for Wheezing or Shortness of Air.  Dispense: 1 g; Refill: 3         Discussion/ Recommendations:   CT scan chest was reviewed no evidence of PE was noted patient has a right lower lobe lung nodule about 6 mm in size no interstitial infiltrates were noted  We will order PFTs for evaluation of her cough which is likely from bronchitis persisting after COVID  We will start her on Zithromax Z-Oliver  Prednisone 20 mg for 1 week  Repeat CT scan in 1 year time for the lung nodule follow-up  Discussed vaccination and recommended    BMI is within normal parameters. No other follow-up for BMI required.           Return in about 1 month (around 8/28/2022).      Thank you for allowing me to participate in the care of Dacia Napier. Please do not hesitate to contact me with any questions.         This document has been electronically signed by Johan De Jesus MD on July 28, 2022 14:59 EDT

## 2022-08-01 RX ORDER — AZITHROMYCIN 250 MG/1
TABLET, FILM COATED ORAL
Qty: 6 TABLET | Refills: 0 | Status: SHIPPED | OUTPATIENT
Start: 2022-08-01 | End: 2022-09-26

## 2022-08-01 RX ORDER — PREDNISONE 20 MG/1
40 TABLET ORAL DAILY
Qty: 14 TABLET | Refills: 0 | Status: SHIPPED | OUTPATIENT
Start: 2022-08-01 | End: 2022-08-29

## 2022-08-08 DIAGNOSIS — R05.9 COUGH: Primary | ICD-10-CM

## 2022-08-08 DIAGNOSIS — Z86.16 HISTORY OF COVID-19: ICD-10-CM

## 2022-08-08 NOTE — PROGRESS NOTES
Mata Love MD  P [facility]  Cc: P [facility] [place] Referral Coordinator [place]  Ok to send this referral             Previous Messages       ----- Message -----   From: Anthony Maldonado MD   Sent: 8/6/2022  10:16 AM EDT   To: Mata Love MD, *   Subject: RE: Cardiopulmonary Rehab Referral               Just seeing this today, Saturday, so it might as well go back to Dr Love since it will be acted on in equal time.     mas   ----- Message -----   From: Britany Fuentes LPN   Sent: 8/5/2022  12:34 PM EDT   To: Anthony Maldonado MD   Subject: FW: Cardiopulmonary Rehab Referral               Sent to Dr Maldonado on call.   ----- Message -----   From: Pili Webb RegSched Rep   Sent: 8/5/2022  11:02 AM EDT   To: Britany Fuentes LPN   Subject: FW: Cardiopulmonary Rehab Referral                 ----- Message -----   From: Lanie Day LPN   Sent: 8/4/2022   4:00 PM EDT   To: Emmanuel Gomez   Subject: FW: Cardiopulmonary Rehab Referral               This may need to go to on call since Kate is out of office the rest of the week.   ----- Message -----   From: Britany Fuentes LPN   Sent: 8/4/2022   3:52 PM EDT   To: Denia Velasquez [facility]   Subject: FW: Cardiopulmonary Rehab Referral               Can you have Dr Love order this since he is her PCP?   ----- Message -----   From: Pili Webb RegSched Rep   Sent: 8/4/2022   2:33 PM EDT   To: Britany Aldridge LPN, *   Subject: Cardiopulmonary Rehab Referral                   I had an incoming call this afternoon from Dariela at the Cardiopulmonary Rehab clinic. She states that the referral order placed by JANES Collins will need to be re-ordered by a MD. For some reason they can only schedule from a referral placed by MD. Cardiopulmonary Phone #: [phone].     Thanks   neymar

## 2022-08-09 RX ORDER — GLIPIZIDE 5 MG/1
5 TABLET ORAL DAILY
Qty: 30 TABLET | Refills: 5 | Status: SHIPPED | OUTPATIENT
Start: 2022-08-09 | End: 2022-12-06

## 2022-08-09 RX ORDER — FLUCONAZOLE 200 MG/1
200 TABLET ORAL DAILY
Qty: 3 TABLET | Refills: 0 | Status: SHIPPED | OUTPATIENT
Start: 2022-08-09 | End: 2022-11-07

## 2022-08-10 DIAGNOSIS — R35.0 URINARY FREQUENCY: Primary | ICD-10-CM

## 2022-08-10 RX ORDER — VIBEGRON 75 MG/1
75 TABLET, FILM COATED ORAL DAILY
Qty: 14 TABLET | Refills: 0 | Status: SHIPPED | OUTPATIENT
Start: 2022-08-10 | End: 2022-10-31

## 2022-08-15 DIAGNOSIS — R35.0 URINARY FREQUENCY: Primary | ICD-10-CM

## 2022-08-24 ENCOUNTER — CLINICAL SUPPORT (OUTPATIENT)
Dept: FAMILY MEDICINE CLINIC | Age: 77
End: 2022-08-24

## 2022-08-24 ENCOUNTER — TRANSCRIBE ORDERS (OUTPATIENT)
Dept: ADMINISTRATIVE | Facility: HOSPITAL | Age: 77
End: 2022-08-24

## 2022-08-24 ENCOUNTER — LAB (OUTPATIENT)
Dept: LAB | Facility: HOSPITAL | Age: 77
End: 2022-08-24

## 2022-08-24 DIAGNOSIS — E11.9 DIABETES MELLITUS WITHOUT COMPLICATION: ICD-10-CM

## 2022-08-24 DIAGNOSIS — N18.30 STAGE 3 CHRONIC KIDNEY DISEASE, UNSPECIFIED WHETHER STAGE 3A OR 3B CKD: Primary | ICD-10-CM

## 2022-08-24 DIAGNOSIS — N18.32 STAGE 3B CHRONIC KIDNEY DISEASE: ICD-10-CM

## 2022-08-24 DIAGNOSIS — Z23 NEED FOR VACCINATION: Primary | ICD-10-CM

## 2022-08-24 DIAGNOSIS — N18.30 STAGE 3 CHRONIC KIDNEY DISEASE, UNSPECIFIED WHETHER STAGE 3A OR 3B CKD: ICD-10-CM

## 2022-08-24 DIAGNOSIS — E55.9 VITAMIN D DEFICIENCY: ICD-10-CM

## 2022-08-24 LAB
25(OH)D3 SERPL-MCNC: 56.6 NG/ML (ref 30–100)
ALBUMIN SERPL-MCNC: 4.3 G/DL (ref 3.5–5.2)
ALBUMIN UR-MCNC: <1.2 MG/DL
ALBUMIN/GLOB SERPL: 1.4 G/DL
ALP SERPL-CCNC: 89 U/L (ref 39–117)
ALT SERPL W P-5'-P-CCNC: 23 U/L (ref 1–33)
ANION GAP SERPL CALCULATED.3IONS-SCNC: 11.7 MMOL/L (ref 5–15)
AST SERPL-CCNC: 29 U/L (ref 1–32)
BASOPHILS # BLD AUTO: 0.02 10*3/MM3 (ref 0–0.2)
BASOPHILS NFR BLD AUTO: 0.4 % (ref 0–1.5)
BILIRUB SERPL-MCNC: 0.6 MG/DL (ref 0–1.2)
BUN SERPL-MCNC: 14 MG/DL (ref 8–23)
BUN/CREAT SERPL: 12.5 (ref 7–25)
CALCIUM SPEC-SCNC: 9.9 MG/DL (ref 8.6–10.5)
CHLORIDE SERPL-SCNC: 103 MMOL/L (ref 98–107)
CHOLEST SERPL-MCNC: 172 MG/DL (ref 0–200)
CO2 SERPL-SCNC: 27.3 MMOL/L (ref 22–29)
CREAT SERPL-MCNC: 1.12 MG/DL (ref 0.57–1)
CREAT UR-MCNC: 80.8 MG/DL
DEPRECATED RDW RBC AUTO: 47.8 FL (ref 37–54)
EGFRCR SERPLBLD CKD-EPI 2021: 51.1 ML/MIN/1.73
EOSINOPHIL # BLD AUTO: 0.19 10*3/MM3 (ref 0–0.4)
EOSINOPHIL NFR BLD AUTO: 4.1 % (ref 0.3–6.2)
ERYTHROCYTE [DISTWIDTH] IN BLOOD BY AUTOMATED COUNT: 14.2 % (ref 12.3–15.4)
GLOBULIN UR ELPH-MCNC: 3 GM/DL
GLUCOSE SERPL-MCNC: 156 MG/DL (ref 65–99)
HBA1C MFR BLD: 7.5 % (ref 4.8–5.6)
HCT VFR BLD AUTO: 42.5 % (ref 34–46.6)
HDLC SERPL-MCNC: 40 MG/DL (ref 40–60)
HGB BLD-MCNC: 13 G/DL (ref 12–15.9)
IMM GRANULOCYTES # BLD AUTO: 0.07 10*3/MM3 (ref 0–0.05)
IMM GRANULOCYTES NFR BLD AUTO: 1.5 % (ref 0–0.5)
LDLC SERPL CALC-MCNC: 83 MG/DL (ref 0–100)
LDLC/HDLC SERPL: 1.82 {RATIO}
LYMPHOCYTES # BLD AUTO: 1.25 10*3/MM3 (ref 0.7–3.1)
LYMPHOCYTES NFR BLD AUTO: 26.8 % (ref 19.6–45.3)
MCH RBC QN AUTO: 28.3 PG (ref 26.6–33)
MCHC RBC AUTO-ENTMCNC: 30.6 G/DL (ref 31.5–35.7)
MCV RBC AUTO: 92.6 FL (ref 79–97)
MICROALBUMIN/CREAT UR: NORMAL MG/G{CREAT}
MONOCYTES # BLD AUTO: 0.46 10*3/MM3 (ref 0.1–0.9)
MONOCYTES NFR BLD AUTO: 9.9 % (ref 5–12)
NEUTROPHILS NFR BLD AUTO: 2.67 10*3/MM3 (ref 1.7–7)
NEUTROPHILS NFR BLD AUTO: 57.3 % (ref 42.7–76)
PLATELET # BLD AUTO: 188 10*3/MM3 (ref 140–450)
PMV BLD AUTO: 9.3 FL (ref 6–12)
POTASSIUM SERPL-SCNC: 4.8 MMOL/L (ref 3.5–5.2)
PROT SERPL-MCNC: 7.3 G/DL (ref 6–8.5)
RBC # BLD AUTO: 4.59 10*6/MM3 (ref 3.77–5.28)
SODIUM SERPL-SCNC: 142 MMOL/L (ref 136–145)
TRIGL SERPL-MCNC: 296 MG/DL (ref 0–150)
VLDLC SERPL-MCNC: 49 MG/DL (ref 5–40)
WBC NRBC COR # BLD: 4.66 10*3/MM3 (ref 3.4–10.8)

## 2022-08-24 PROCEDURE — 36415 COLL VENOUS BLD VENIPUNCTURE: CPT

## 2022-08-24 PROCEDURE — 80053 COMPREHEN METABOLIC PANEL: CPT

## 2022-08-24 PROCEDURE — 85025 COMPLETE CBC W/AUTO DIFF WBC: CPT

## 2022-08-24 PROCEDURE — 90677 PCV20 VACCINE IM: CPT | Performed by: FAMILY MEDICINE

## 2022-08-24 PROCEDURE — 82570 ASSAY OF URINE CREATININE: CPT

## 2022-08-24 PROCEDURE — G0009 ADMIN PNEUMOCOCCAL VACCINE: HCPCS | Performed by: FAMILY MEDICINE

## 2022-08-24 PROCEDURE — 83036 HEMOGLOBIN GLYCOSYLATED A1C: CPT

## 2022-08-24 PROCEDURE — 80061 LIPID PANEL: CPT

## 2022-08-24 PROCEDURE — 82043 UR ALBUMIN QUANTITATIVE: CPT

## 2022-08-24 PROCEDURE — 82306 VITAMIN D 25 HYDROXY: CPT

## 2022-08-29 ENCOUNTER — TELEPHONE (OUTPATIENT)
Dept: FAMILY MEDICINE CLINIC | Age: 77
End: 2022-08-29

## 2022-08-29 RX ORDER — PREDNISONE 10 MG/1
TABLET ORAL
Qty: 42 TABLET | Refills: 0 | Status: SHIPPED | OUTPATIENT
Start: 2022-08-29 | End: 2022-09-26

## 2022-08-29 NOTE — TELEPHONE ENCOUNTER
Patient has been having side affects to the Prolia, Fever of 101 for 2 -3 days after. Muscle pain in the arms , legs, back. Headaches and constipation all for 3 months after getting the inject. Patient was advise to hold off until her appt in 1/2023 to discuss with PCP. Per Dr Love, have her hold today.

## 2022-08-31 DIAGNOSIS — E11.9 TYPE 2 DIABETES MELLITUS WITHOUT COMPLICATION, WITHOUT LONG-TERM CURRENT USE OF INSULIN: Primary | ICD-10-CM

## 2022-08-31 RX ORDER — LANCETS
1 EACH MISCELLANEOUS 2 TIMES DAILY
Qty: 100 EACH | Refills: 3 | Status: SHIPPED | OUTPATIENT
Start: 2022-08-31 | End: 2023-02-27

## 2022-08-31 RX ORDER — LANCETS
1 EACH MISCELLANEOUS 2 TIMES DAILY
COMMUNITY
End: 2022-08-31 | Stop reason: SDUPTHER

## 2022-08-31 NOTE — TELEPHONE ENCOUNTER
Rx Refill Note  Requested Prescriptions     Pending Prescriptions Disp Refills   • glucose blood (Accu-Chek Guide) test strip 100 each 3     Si each by Other route 2 (Two) Times a Day.   • Accu-Chek Softclix Lancets lancets 100 each 3     Si each by Other route 2 (Two) Times a Day. Use as instructed      Last office visit with prescribing clinician: 2021      Next office visit with prescribing clinician: 2023    Taylor Adams LPN  22, 12:20 EDT

## 2022-09-06 ENCOUNTER — OFFICE VISIT (OUTPATIENT)
Dept: PULMONOLOGY | Facility: CLINIC | Age: 77
End: 2022-09-06

## 2022-09-06 VITALS
BODY MASS INDEX: 23.55 KG/M2 | DIASTOLIC BLOOD PRESSURE: 75 MMHG | SYSTOLIC BLOOD PRESSURE: 118 MMHG | RESPIRATION RATE: 18 BRPM | WEIGHT: 128 LBS | OXYGEN SATURATION: 94 % | HEART RATE: 67 BPM | TEMPERATURE: 97.8 F | HEIGHT: 62 IN

## 2022-09-06 DIAGNOSIS — R05.9 COUGH: ICD-10-CM

## 2022-09-06 DIAGNOSIS — R35.0 URINARY FREQUENCY: Primary | ICD-10-CM

## 2022-09-06 DIAGNOSIS — U09.9 LONG COVID: ICD-10-CM

## 2022-09-06 DIAGNOSIS — J20.9 ACUTE BRONCHITIS, UNSPECIFIED ORGANISM: Primary | ICD-10-CM

## 2022-09-06 PROCEDURE — 99213 OFFICE O/P EST LOW 20 MIN: CPT | Performed by: INTERNAL MEDICINE

## 2022-09-06 RX ORDER — VALACYCLOVIR HYDROCHLORIDE 500 MG/1
500 TABLET, FILM COATED ORAL AS NEEDED
COMMUNITY
Start: 2022-08-22

## 2022-09-06 NOTE — PROGRESS NOTES
Pulmonary Office Follow-up    Subjective     Dacia Napier is seen today at the office for   Chief Complaint   Patient presents with   • Follow-up   • Cough         HPI  Dacia Napier is a 76 y.o. female with a PMH significant for bronchitis and COVID-19 long hauler presents for follow-up  Patient is doing much better is getting physical therapy and her oxygen saturations have been staying good she denies any significant cough expectoration or chest pain and her dyspnea is much improved      Tobacco use history:  Never smoker      Patient Active Problem List   Diagnosis   • Postmenopausal atrophic vaginitis   • Agenesis of right kidney   • Type 2 diabetes mellitus without complication (HCC)   • Hiatal hernia with GERD without esophagitis   • High cholesterol   • Postmenopausal osteoporosis   • Other allergic rhinitis   • Palpitations   • Heart murmur (neg ECHO in 2021)    • Stage 3b chronic kidney disease (HCC)   • Medicare annual wellness visit, subsequent       Review of Systems  Review of Systems   Respiratory: Positive for cough.    All other systems reviewed and are negative.    As described in the HPI. Otherwise, remainder of ROS (14 systems) were negative.    Medications, Allergies, Social, and Family Histories reviewed as per EMR.    Objective     Vitals:    09/06/22 1441   BP: 118/75   Pulse: 67   Resp: 18   Temp: 97.8 °F (36.6 °C)   SpO2: 94%         09/06/22  1441   Weight: 58.1 kg (128 lb)       Physical Exam  Vitals and nursing note reviewed.   Constitutional:       Appearance: Normal appearance.   HENT:      Head: Normocephalic and atraumatic.      Nose: Nose normal.      Mouth/Throat:      Mouth: Mucous membranes are moist.      Pharynx: Oropharynx is clear.   Eyes:      Extraocular Movements: Extraocular movements intact.      Conjunctiva/sclera: Conjunctivae normal.      Pupils: Pupils are equal, round, and reactive to light.   Cardiovascular:      Rate and Rhythm: Normal rate and regular  rhythm.      Pulses: Normal pulses.      Heart sounds: Normal heart sounds.   Pulmonary:      Effort: Pulmonary effort is normal.      Breath sounds: Normal breath sounds.   Abdominal:      General: Abdomen is flat. Bowel sounds are normal.      Palpations: Abdomen is soft.   Musculoskeletal:         General: Normal range of motion.      Cervical back: Normal range of motion and neck supple.   Skin:     General: Skin is warm.      Capillary Refill: Capillary refill takes 2 to 3 seconds.   Neurological:      General: No focal deficit present.      Mental Status: She is alert and oriented to person, place, and time.   Psychiatric:         Mood and Affect: Mood normal.         Behavior: Behavior normal.             Assessment & Plan     Diagnoses and all orders for this visit:    1. Acute bronchitis, unspecified organism (Primary)    2. Long COVID    3. Cough         Discussion/ Recommendations:   Patient seems to have improved significantly her lung examination is normal at this time with normal O2 sats  Patient is advised to continue her exercise and and to take the albuterol only as needed  Vaccinations discussed and recommended    BMI is within normal parameters. No other follow-up for BMI required.        Return in about 3 months (around 12/6/2022).          This document has been electronically signed by Johan De Jesus MD on September 6, 2022 14:57 EDT

## 2022-09-07 ENCOUNTER — TELEPHONE (OUTPATIENT)
Dept: DIABETES SERVICES | Facility: HOSPITAL | Age: 77
End: 2022-09-07

## 2022-09-07 RX ORDER — VIBEGRON 75 MG/1
75 TABLET, FILM COATED ORAL DAILY
Qty: 14 TABLET | Refills: 0 | COMMUNITY
Start: 2022-09-07 | End: 2022-12-06

## 2022-09-07 NOTE — TELEPHONE ENCOUNTER
Pt called and left a voicemail returning your call, would like for you to call her back . Please call pt back @[phone]

## 2022-09-09 NOTE — TELEPHONE ENCOUNTER
The patient was contacted and she states that she is getting shaky feeling like her glucose level is dropping after she takes the 5 mg glipizide.  She states that if she does not eat at the exact time that she takes the pill these feelings are worse.  She was advised to cut the pill in half and take 2.5 mg with breakfast meal and 2.5 mg with her dinner meal.  If she continues to have problems she will contact our office for further directions

## 2022-09-19 ENCOUNTER — CLINICAL SUPPORT (OUTPATIENT)
Dept: FAMILY MEDICINE CLINIC | Age: 77
End: 2022-09-19

## 2022-09-19 DIAGNOSIS — M81.0 POSTMENOPAUSAL OSTEOPOROSIS: ICD-10-CM

## 2022-09-19 PROCEDURE — 96372 THER/PROPH/DIAG INJ SC/IM: CPT | Performed by: FAMILY MEDICINE

## 2022-09-20 ENCOUNTER — TELEPHONE (OUTPATIENT)
Dept: UROLOGY | Facility: CLINIC | Age: 77
End: 2022-09-20

## 2022-09-20 NOTE — TELEPHONE ENCOUNTER
----- Message from Angelika Prince sent at 9/20/2022  3:59 PM EDT -----  Regarding: appt on 9/23  We need to move patients cysto appt, we need to move it to 9/26 @ 3:45 or move it up to 0715 on 9/23/22 otherwise we will have to move it out a few weeks.

## 2022-09-20 NOTE — TELEPHONE ENCOUNTER
"LVM FOR PT TO CALL OFFICE BACK PER [facility] \"We need to move patients cysto appt, we need to move it to 9/26 @ 3:45 or move it up to 0715 on 9/23/22 otherwise we will have to move it out a few weeks\"/MS    "

## 2022-09-21 NOTE — TELEPHONE ENCOUNTER
SPOKE TO PT AND MOVED HER CYSTO TO 9/26  PER MELODY/PT IS ON SCHEDULE  BECAUSE A SLOT COULD NOT BE OPENED UP /PT AWARE APPT TIME /MS

## 2022-09-25 PROBLEM — N39.41 URGENCY INCONTINENCE: Status: ACTIVE | Noted: 2022-09-25

## 2022-09-25 NOTE — PROGRESS NOTES
Procedures       Urinalysis was checked today and was negative for signs of infection      Cytoscopy Procedure:     Procedure: Flexible cytoscope was passed per urethra into the bladder without difficulty after proper consent. The bladder was inspected in a systematic meridian fashion.       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  Some minor hypervascularity but otherwise looked okay.  No abnormal pathology      There were no tumors, lesions, stones, or other abnormalities noted within the bladder. . Both ureteral orifices were identified and were normal in appearance. The flexible cytoscope was removed. The patient tolerated the procedure well.           7/22-saw nurse practitioner - burning/pain with urination/frequency backaches  Uses vaginal Estrace  Continues to have pain when cultures are negative  New onset urgency and urge incontinence with frequency of every 2 hours    Drinks 48 ounces of water a day  Non-smoker      Urine cultures:   7/18/2022 no growth      7/7/2022 10,000-40,000 colony-forming units per milliliter of Klebsiella pneumoniae resistant to ampicillin, intermittent susceptibility to nitrofurantoin     4/26/2022 no growth       4/8/2022 Klebsiella aerogenes greater than 100,000 colony-forming units per milliliter resistant to cefazolin intermediate susceptibility to nitrofurantoin     1/10/2022 no growth      Patient has recently been having more trouble with nocturia and urgency    PLAN    Continue drink plenty fluids    Continue Estrace vaginal cream    Continue Gemtesa 75 mg daily, samples given today.    Follow-up in 3 months in office    PVR follow-up        This document has been electronically signed by Rory Paredes MD  September 25, 2022 06:20 EDT

## 2022-09-26 ENCOUNTER — PROCEDURE VISIT (OUTPATIENT)
Dept: UROLOGY | Facility: CLINIC | Age: 77
End: 2022-09-26

## 2022-09-26 VITALS — RESPIRATION RATE: 18 BRPM

## 2022-09-26 DIAGNOSIS — R35.0 URINARY FREQUENCY: ICD-10-CM

## 2022-09-26 DIAGNOSIS — N39.41 URGENCY INCONTINENCE: Primary | ICD-10-CM

## 2022-09-26 DIAGNOSIS — N39.0 RECURRENT URINARY TRACT INFECTION: ICD-10-CM

## 2022-09-26 PROCEDURE — 52000 CYSTOURETHROSCOPY: CPT | Performed by: UROLOGY

## 2022-10-04 ENCOUNTER — CLINICAL SUPPORT (OUTPATIENT)
Dept: FAMILY MEDICINE CLINIC | Age: 77
End: 2022-10-04

## 2022-10-04 DIAGNOSIS — Z23 NEED FOR INFLUENZA VACCINATION: Primary | ICD-10-CM

## 2022-10-04 PROCEDURE — 90662 IIV NO PRSV INCREASED AG IM: CPT | Performed by: FAMILY MEDICINE

## 2022-10-04 PROCEDURE — G0008 ADMIN INFLUENZA VIRUS VAC: HCPCS | Performed by: FAMILY MEDICINE

## 2022-10-18 ENCOUNTER — PATIENT MESSAGE (OUTPATIENT)
Dept: FAMILY MEDICINE CLINIC | Age: 77
End: 2022-10-18

## 2022-10-18 DIAGNOSIS — Z20.822 CLOSE EXPOSURE TO COVID-19 VIRUS: Primary | ICD-10-CM

## 2022-10-20 DIAGNOSIS — Z11.59 SCREENING FOR VIRAL DISEASE: Primary | ICD-10-CM

## 2022-10-28 ENCOUNTER — LAB (OUTPATIENT)
Dept: LAB | Facility: HOSPITAL | Age: 77
End: 2022-10-28

## 2022-10-28 DIAGNOSIS — Z11.59 SCREENING FOR VIRAL DISEASE: ICD-10-CM

## 2022-10-28 DIAGNOSIS — Z20.822 CLOSE EXPOSURE TO COVID-19 VIRUS: ICD-10-CM

## 2022-10-28 PROCEDURE — 36415 COLL VENOUS BLD VENIPUNCTURE: CPT

## 2022-10-28 PROCEDURE — 86769 SARS-COV-2 COVID-19 ANTIBODY: CPT

## 2022-10-31 ENCOUNTER — OFFICE VISIT (OUTPATIENT)
Dept: DIABETES SERVICES | Facility: HOSPITAL | Age: 77
End: 2022-10-31

## 2022-10-31 VITALS
BODY MASS INDEX: 25.72 KG/M2 | DIASTOLIC BLOOD PRESSURE: 78 MMHG | WEIGHT: 139.77 LBS | HEART RATE: 87 BPM | OXYGEN SATURATION: 96 % | HEIGHT: 62 IN | TEMPERATURE: 97.2 F | SYSTOLIC BLOOD PRESSURE: 118 MMHG

## 2022-10-31 DIAGNOSIS — E66.3 OVERWEIGHT (BMI 25.0-29.9): ICD-10-CM

## 2022-10-31 DIAGNOSIS — E11.22 TYPE 2 DIABETES MELLITUS WITH STAGE 3A CHRONIC KIDNEY DISEASE, WITH LONG-TERM CURRENT USE OF INSULIN: ICD-10-CM

## 2022-10-31 DIAGNOSIS — E11.65 UNCONTROLLED TYPE 2 DIABETES MELLITUS WITH HYPERGLYCEMIA: Primary | ICD-10-CM

## 2022-10-31 DIAGNOSIS — Z79.4 TYPE 2 DIABETES MELLITUS WITH STAGE 3A CHRONIC KIDNEY DISEASE, WITH LONG-TERM CURRENT USE OF INSULIN: ICD-10-CM

## 2022-10-31 DIAGNOSIS — N18.31 TYPE 2 DIABETES MELLITUS WITH STAGE 3A CHRONIC KIDNEY DISEASE, WITH LONG-TERM CURRENT USE OF INSULIN: ICD-10-CM

## 2022-10-31 LAB
EXPIRATION DATE: ABNORMAL
GLUCOSE BLDC GLUCOMTR-MCNC: 98 MG/DL (ref 70–99)
HBA1C MFR BLD: 6.6 %
Lab: ABNORMAL
SARS-COV-2 AB SERPL IA-ACNC: NORMAL U/ML
SARS-COV-2 AB SERPL IA-ACNC: NORMAL U/ML
SARS-COV-2 AB SERPL-IMP: POSITIVE

## 2022-10-31 PROCEDURE — 82962 GLUCOSE BLOOD TEST: CPT | Performed by: NURSE PRACTITIONER

## 2022-10-31 PROCEDURE — G0463 HOSPITAL OUTPT CLINIC VISIT: HCPCS | Performed by: NURSE PRACTITIONER

## 2022-10-31 PROCEDURE — 83036 HEMOGLOBIN GLYCOSYLATED A1C: CPT | Performed by: NURSE PRACTITIONER

## 2022-10-31 PROCEDURE — 99214 OFFICE O/P EST MOD 30 MIN: CPT | Performed by: NURSE PRACTITIONER

## 2022-10-31 NOTE — PATIENT INSTRUCTIONS
A prescription for Mounjaro 2.5 mg once weekly was sent to your pharmacy.  Take this on the same day of each week.    If the Mounjaro is too costly then instead go back to using your glipizide but take only 2.5 mg or half a tablet each morning with breakfast.

## 2022-10-31 NOTE — PROGRESS NOTES
Chief Complaint  Diabetes (Follow up, med mgt, a1c eval,)    Referred By: Mata Love,*    Subjective          Dacia Napier presents to [facility] for diabetes medication management    History of Present Illness    Visit type:  follow-up  Diabetes type:  Type 2  Current diabetes status/concerns/issues: She was unable to tolerate the Jardiance due to urinary tract infections and yeast infections.  She was transitioned off the medication and started on glipizide 5 mg once a day.  The 5 mg once a day was then split into 2.5 mg twice a day to prevent hypoglycemia.  She states she still having lows during the overnight hours and she is also concerned because she has had weight gain since her last appointment.  Her weight is up by 3 pounds at today's appointment.  Other health concerns: Urinary frequency.  She had COVID in June  Diabetes symptoms:    Polyuria: Yes   Polydipsia: No   Polyphagia: No   Blurred vision: No   Excessive fatigue: No   Diabetes complications:  Neuro: None  Renal: Stage III renal disease  Eyes: None  Amputation/Wounds: None  GI: None  Cardiovascular: Hypercholesterolemia  ED: N/A   Other: None  Hypoglycemia:  Level 1 hypoglycemia (54 mg/dL - 70 mg/dL); Frequency - She reports having episodes of hypoglycemia about 4 times a week.  She does not necessarily test at the time because she goes ahead and eat something so is unknown how low she is going.  Hypoglycemia Symptoms:  shaking/tremors and sweating  Current diabetes treatment: Glipizide 5 mg every morning  Blood glucose device:  Meter  Blood glucose monitoring frequency:  1  Blood glucose range/average: Her blood glucose meter shows a 30-day glucose average of 142 mg/dL.  In review of her meter log it shows most glucose levels are staying well below 150 mg/dL  Diet:  Limits high carb/sweet foods, Avoids sugary drinks  Activity/Exercise:  Walking and other exercising    Past Medical History:   Diagnosis  Date   • Anemia 06/2021    Iron prescription   • Arrhythmia    • Colon polyp    • Diabetes mellitus (HCC)    • GERD (gastroesophageal reflux disease)    • Hiatal hernia    • Hyperlipidemia    • Lung nodule 7/2022    On my ct scan at [facility]   • Rapid or irregular heartbeat    • Seasonal allergies    • Type 2 diabetes mellitus (HCC)      Past Surgical History:   Procedure Laterality Date   • CARDIAC CATHETERIZATION     • CATARACT EXTRACTION, BILATERAL     • COLONOSCOPY     • COLONOSCOPY N/A 06/25/2021    Procedure: COLONOSCOPY;  Surgeon: Ty Partida MD;  Location: [facility] ENDOSCOPY;  Service: General;  Laterality: N/A;  Colon polyp   • ENDOSCOPY N/A 06/25/2021    Procedure: ESOPHAGOGASTRODUODENOSCOPY;  Surgeon: Ty Partida MD;  Location: [facility] ENDOSCOPY;  Service: General;  Laterality: N/A;  Hiatal Hernia   • EYE CAPSULOTOMY WITH LASER     • HERNIA REPAIR     • HYSTERECTOMY     • LASIK     • TONSILLECTOMY       Family History   Problem Relation Age of Onset   • Heart disease Mother    • Heart failure Mother         Congestive heart failure   • Heart disease Father    • Heart failure Father         3 heart attacks in 6 years   • Heart disease Sister    • Cancer Sister         Breast cancer   • Heart disease Brother    • Cancer Brother         Tongue cancer   • Heart failure Brother         Pace maker defibrillator and stents at 40   • Cancer Sister         Melanoma skin cancer   • Heart failure Brother         By pass   • Heart failure Sister         5 by pass   • Malig Hyperthermia Neg Hx      Social History     Socioeconomic History   • Marital status:    Tobacco Use   • Smoking status: Never   • Smokeless tobacco: Never   Vaping Use   • Vaping Use: Never used   Substance and Sexual Activity   • Alcohol use: Not Currently     Comment: Maybe couple times year   • Drug use: Never   • Sexual activity: Not Currently     Partners: Male     Birth control/protection: Condom, Pill, Diaphragm,  Spermicide     No Known Allergies    Current Outpatient Medications:   •  Accu-Chek Softclix Lancets lancets, 1 each by Other route 2 (Two) Times a Day. Use as instructed, Disp: 100 each, Rfl: 3  •  albuterol sulfate HFA (Ventolin HFA) 108 (90 Base) MCG/ACT inhaler, Inhale 2 puffs Every 4 (Four) Hours As Needed for Wheezing or Shortness of Air., Disp: 1 g, Rfl: 3  •  aspirin (aspirin) 81 MG EC tablet, , Disp: , Rfl:   •  Calcium Carbonate 1500 (600 Ca) MG tablet, 2 (Two) Times a Day With Meals., Disp: , Rfl:   •  cetirizine (zyrTEC) 10 MG tablet, Take 10 mg by mouth Daily As Needed., Disp: , Rfl:   •  Cholecalciferol (Vitamin D3) 50 MCG (2000 UT) tablet, Take 2,000 Units by mouth Daily., Disp: , Rfl:   •  denosumab (XGEVA) 120 MG/1.7ML solution injection, Inject  under the skin into the appropriate area as directed 1 (One) Time., Disp: , Rfl:   •  estradiol (ESTRACE) 0.1 MG/GM vaginal cream, INSERT 2 GRAMS VAGINALLY 2 TIMES A WEEK, Disp: 42.5 g, Rfl: 3  •  ezetimibe (ZETIA) 10 MG tablet, Take 1 tablet by mouth Daily., Disp: 90 tablet, Rfl: 1  •  FeroSul 325 (65 Fe) MG tablet, TAKE 1 TABLET BY MOUTH 2 TIMES DAILY (Patient taking differently: Take 1 tablet by mouth Every Other Day.), Disp: 60 tablet, Rfl: 3  •  fluticasone (FLONASE) 50 MCG/ACT nasal spray, INSTILL 2 SPRAYS INTO EACH NOSTRIL EVERY DAY AS DIRECTED, Disp: 16 g, Rfl: 3  •  glipizide (GLUCOTROL) 5 MG tablet, Take 1 tablet by mouth Daily., Disp: 30 tablet, Rfl: 5  •  glucose blood (Accu-Chek Guide) test strip, 1 each by Other route 2 (Two) Times a Day., Disp: 100 each, Rfl: 3  •  glucose blood test strip, 1 each by Other route 2 (Two) Times a Day. Check blood sugar bid, Disp: 100 each, Rfl: 11  •  Omega-3 1000 MG capsule, Take 1,000 mg by mouth Daily., Disp: , Rfl:   •  omeprazole (priLOSEC) 20 MG capsule, Take 1 capsule by mouth Daily., Disp: 90 capsule, Rfl: 1  •  rosuvastatin (CRESTOR) 10 MG tablet, Take 1 tablet by mouth Every Night., Disp: 90 tablet,  "Rfl: 1  •  valACYclovir (VALTREX) 500 MG tablet, Take 500 mg by mouth Daily., Disp: , Rfl:   •  Vibegron (Gemtesa) 75 MG tablet, Take 1 tablet by mouth Daily., Disp: 14 tablet, Rfl: 0  •  fluconazole (DIFLUCAN) 200 MG tablet, Take 1 tablet by mouth Daily., Disp: 3 tablet, Rfl: 0    Current Facility-Administered Medications:   •  denosumab (PROLIA) syringe 60 mg, 60 mg, Subcutaneous, Q6 Months, Mata Love MD, 60 mg at 09/19/22 1509    Review of Systems   Constitutional: Positive for activity change and unexpected weight gain (3 pounds). Negative for appetite change, fatigue and unexpected weight loss.   Eyes: Negative for blurred vision and visual disturbance.   Gastrointestinal: Positive for constipation. Negative for abdominal pain, diarrhea, nausea, vomiting, GERD and indigestion.   Endocrine: Positive for polyuria. Negative for polydipsia and polyphagia.   Neurological: Negative for numbness.        Objective     Vitals:    10/31/22 1307   BP: 118/78   BP Location: Left arm   Patient Position: Sitting   Cuff Size: Adult   Pulse: 87   Temp: 97.2 °F (36.2 °C)   SpO2: 96%   Weight: 63.4 kg (139 lb 12.4 oz)   Height: 157.5 cm (62\")   PainSc: 0-No pain     Body mass index is 25.56 kg/m².    Physical Exam  Constitutional:       Appearance: Normal appearance.      Comments: Overweight with BMI of 25.56   HENT:      Head: Normocephalic and atraumatic.      Right Ear: External ear normal.      Left Ear: External ear normal.      Nose: Nose normal.   Eyes:      Extraocular Movements: Extraocular movements intact.      Conjunctiva/sclera: Conjunctivae normal.   Pulmonary:      Effort: Pulmonary effort is normal.   Musculoskeletal:         General: Normal range of motion.      Cervical back: Normal range of motion.   Skin:     General: Skin is warm and dry.   Neurological:      General: No focal deficit present.      Mental Status: She is alert and oriented to person, place, and time. Mental status is at " baseline.   Psychiatric:         Mood and Affect: Mood normal.         Behavior: Behavior normal.         Thought Content: Thought content normal.         Judgment: Judgment normal.         Result Review :   The following data was reviewed by: JANES Betancourt on 10/31/2022:    Most Recent A1C    HGBA1C Most Recent 10/31/22   Hemoglobin A1C 6.6             A1C Last 3 Results    HGBA1C Last 3 Results 6/24/22 8/24/22 10/31/22   Hemoglobin A1C 6.90 (A) 7.50 (A) 6.6   (A) Abnormal value            Point-of-care A1c in the office today was 6.6% indicating controlled type 2 diabetes.  This is down from the prior result of 7.5 collected in August of this year.    Glucose   Date Value Ref Range Status   10/31/2022 98 70 - 99 mg/dL Final     Comment:     Serial Number: [id number]:  [id number]     Point-of-care glucose in the office today is within normal limits at 98 mg/dL    Creatinine   Date Value Ref Range Status   08/24/2022 1.12 (H) 0.57 - 1.00 mg/dL Final   06/24/2022 1.15 (H) 0.57 - 1.00 mg/dL Final     eGFR   Date Value Ref Range Status   08/24/2022 51.1 (L) >60.0 mL/min/1.73 Final     Comment:     National Kidney Foundation and American Society of Nephrology (ASN) Task Force recommended calculation based on the Chronic Kidney Disease Epidemiology Collaboration (CKD-EPI) equation refit without adjustment for race.   06/24/2022 49.5 (L) >60.0 mL/min/1.73 Final     Comment:     National Kidney Foundation and American Society of Nephrology (ASN) Task Force recommended calculation based on the Chronic Kidney Disease Epidemiology Collaboration (CKD-EPI) equation refit without adjustment for race.     Labs collected on 8/24/2022 show stage III renal disease    Microalbumin, Urine   Date Value Ref Range Status   08/24/2022 <1.2 mg/dL Final   11/22/2021 1.2 mg/dL Final     Creatinine, Urine   Date Value Ref Range Status   08/24/2022 80.8 mg/dL Final   11/22/2021 166.1 mg/dL Final   11/22/2021 145.7 mg/dL Final      Microalbumin/Creatinine Ratio   Date Value Ref Range Status   08/24/2022   Final     Comment:     Unable to calculate   11/22/2021 8.2 mg/g Final     Urine microalbumin collected on 8/24/2022 is within normal limits    Total Cholesterol   Date Value Ref Range Status   08/24/2022 172 0 - 200 mg/dL Final   06/24/2022 135 0 - 200 mg/dL Final     Triglycerides   Date Value Ref Range Status   08/24/2022 296 (H) 0 - 150 mg/dL Final   06/24/2022 151 (H) 0 - 150 mg/dL Final     HDL Cholesterol   Date Value Ref Range Status   08/24/2022 40 40 - 60 mg/dL Final   06/24/2022 51 40 - 60 mg/dL Final     LDL Cholesterol    Date Value Ref Range Status   08/24/2022 83 0 - 100 mg/dL Final   06/24/2022 58 0 - 100 mg/dL Final     Lipid panel collected on 8/24/2022 shows hypertriglyceridemia            Assessment: Her A1c shows controlled type 2 diabetes today.  She was unable to tolerate the Jardiance that was started at the last appointment due to UTIs and vaginal yeast infections.  She was transitioned off that medication and onto glipizide.  Glipizide has been effective in controlling her blood sugar but it has caused problems with low glucose levels especially with activity and she is concerned because she has had a 3 pound weight gain with the medication.  She has had prior intolerance to metformin in the past due to GI distress.      Diagnoses and all orders for this visit:    1. Uncontrolled type 2 diabetes mellitus with hyperglycemia (HCC) (Primary)  -     POC Glycosylated Hemoglobin (Hb A1C)    2. Type 2 diabetes mellitus with stage 3a chronic kidney disease, with long-term current use of insulin (HCC)    3. Overweight (BMI 25.0-29.9)    Other orders  -     POC Glucose        Plan: We will try the patient on Mounjaro with a starting dose of 2.5 mg once weekly x4 doses.  If tolerated we will then do a stairstep increase in the dose as needed to achieve glucose control and possible weight loss.  If the medication is too  costly we will consider staying on the glipizide but only taking 2.5 mg once a day in the morning with food.    The patient will monitor her blood glucose levels once daily.  If she develops problematic hyperglycemia or hypoglycemia or adverse drug reactions, she will contact the office for further instructions.        Follow Up     Return in about 3 months (around 1/31/2023) for Medication Management.    Patient was given instructions and counseling regarding her condition or for health maintenance advice. Please see specific information pulled into the AVS if appropriate.     Annabelle Buckley, JANES  10/31/2022      Dictated Utilizing Dragon Dictation.  Please note that portions of this note were completed with a voice recognition program.  Part of this note may be an electronic transcription/translation of spoken language to printed text using the Dragon Dictation System.

## 2022-11-01 RX ORDER — ESTRADIOL 0.1 MG/G
2 CREAM VAGINAL DAILY
Qty: 42.5 G | Refills: 3 | Status: SHIPPED | OUTPATIENT
Start: 2022-11-01 | End: 2023-01-23 | Stop reason: SDUPTHER

## 2022-11-01 NOTE — TELEPHONE ENCOUNTER
Rx Refill Note  Requested Prescriptions     Pending Prescriptions Disp Refills   • estradiol (ESTRACE) 0.1 MG/GM vaginal cream 42.5 g 3      Last office visit with prescribing clinician: 9/1/2021      Next office visit with prescribing clinician: 1/13/2023     Lanie Day LPN  11/01/22, 15:47 EDT     LF-12/22/21 #42.5 GRAMS WITH RF-3

## 2022-11-02 LAB
LABORATORY COMMENT REPORT: NORMAL
Lab: NORMAL
PATHOLOGIST NAME: NORMAL
SARS-COV-2 IGM SERPL IA-ACNC: 0.04 COI
SARS-COV-2 IGM SERPL QL IA: NEGATIVE

## 2022-11-03 ENCOUNTER — TELEPHONE (OUTPATIENT)
Dept: UROLOGY | Facility: CLINIC | Age: 77
End: 2022-11-03

## 2022-11-03 ENCOUNTER — LAB (OUTPATIENT)
Dept: LAB | Facility: HOSPITAL | Age: 77
End: 2022-11-03

## 2022-11-03 DIAGNOSIS — N39.0 RECURRENT URINARY TRACT INFECTION: Primary | ICD-10-CM

## 2022-11-03 DIAGNOSIS — N39.0 RECURRENT URINARY TRACT INFECTION: ICD-10-CM

## 2022-11-03 PROCEDURE — 87086 URINE CULTURE/COLONY COUNT: CPT

## 2022-11-03 PROCEDURE — 87186 SC STD MICRODIL/AGAR DIL: CPT

## 2022-11-03 PROCEDURE — 87077 CULTURE AEROBIC IDENTIFY: CPT

## 2022-11-03 NOTE — TELEPHONE ENCOUNTER
Patient called again to ask about her s/s. She said she stopped taking the Gemtesa but still thinks she has an infection. ucx order is in the chart, she will go do.

## 2022-11-03 NOTE — TELEPHONE ENCOUNTER
Caller: JOSE MOREJON     Relationship: SELF     Best call back number: [phone]    PLEASE PUT ORDER IN FOR PT TO GET A URINE CULTURE,  SHE IS GOING TO [facility] THIS MORNING.    PT ALSO REQUESTED AN APPT WITH DR. SANDOVAL DUE TO WORSENING SYMPTOMS. SHE IS ON GEMTESA AND SAYS THIS IS SUPPOSED TO HELP HER URINARY FREQUENCY BUT IT MAKES IT WORSE, SAYS SHE IS HAVING BLADDER SPASMS MORE FREQUENTLY.     Provider: MICHELLE SANDOVAL MD   Timing- Is it constant or intermittent: INTERMITTENT

## 2022-11-04 NOTE — TELEPHONE ENCOUNTER
Pt requests her prescription to be sent to pharmacy was prescribed this on 10.31.2022 no sample was gave in office

## 2022-11-05 LAB — BACTERIA SPEC AEROBE CULT: ABNORMAL

## 2022-11-07 ENCOUNTER — TELEPHONE (OUTPATIENT)
Dept: UROLOGY | Facility: CLINIC | Age: 77
End: 2022-11-07

## 2022-11-07 DIAGNOSIS — N39.0 URINARY TRACT INFECTION WITHOUT HEMATURIA, SITE UNSPECIFIED: Primary | ICD-10-CM

## 2022-11-07 RX ORDER — LACTOBACILLUS ACIDOPHILUS 20B CELL
1 CAPSULE ORAL DAILY
Qty: 14 CAPSULE | Refills: 0 | Status: SHIPPED | OUTPATIENT
Start: 2022-11-07 | End: 2022-11-21

## 2022-11-07 RX ORDER — FLUCONAZOLE 150 MG/1
150 TABLET ORAL ONCE
Qty: 1 TABLET | Refills: 0 | Status: SHIPPED | OUTPATIENT
Start: 2022-11-07 | End: 2022-11-07

## 2022-11-07 RX ORDER — LEVOFLOXACIN 500 MG/1
500 TABLET, FILM COATED ORAL DAILY
Qty: 14 TABLET | Refills: 0 | Status: SHIPPED | OUTPATIENT
Start: 2022-11-07 | End: 2022-11-21

## 2022-11-07 NOTE — TELEPHONE ENCOUNTER
----- Message from Rory Paredes MD sent at 11/6/2022  4:10 PM EST -----  Regarding: abx  11/6/2022 Klebsiella 100,000 - levofloxacin 500 mg daily x14 days, also do Florajen daily.    ----- Message -----  From: Lab, Background User  Sent: 11/4/2022   9:47 AM EST  To: Rory Paredes MD

## 2022-11-07 NOTE — TELEPHONE ENCOUNTER
Call back from pt:    Pt feels like he is awake more at night with this change. And his wife has noticed him snoring through mask. His current mask is new and fits well. Pt is also feeling more lethargic during the day.     Pt will await a return call from office on Monday after Dr. Hughes has reviewed the download.         Called and made patient aware, she voiced understanding and requested Diflucan be sent in also

## 2022-11-16 DIAGNOSIS — J30.89 OTHER ALLERGIC RHINITIS: ICD-10-CM

## 2022-11-16 RX ORDER — FLUTICASONE PROPIONATE 50 MCG
SPRAY, SUSPENSION (ML) NASAL
Qty: 16 G | Refills: 3 | Status: SHIPPED | OUTPATIENT
Start: 2022-11-16 | End: 2023-03-13

## 2022-11-29 ENCOUNTER — LAB (OUTPATIENT)
Dept: LAB | Facility: HOSPITAL | Age: 77
End: 2022-11-29

## 2022-11-29 ENCOUNTER — TRANSCRIBE ORDERS (OUTPATIENT)
Dept: ADMINISTRATIVE | Facility: HOSPITAL | Age: 77
End: 2022-11-29

## 2022-11-29 DIAGNOSIS — N18.30 STAGE 3 CHRONIC KIDNEY DISEASE, UNSPECIFIED WHETHER STAGE 3A OR 3B CKD: Primary | ICD-10-CM

## 2022-11-29 DIAGNOSIS — N18.30 STAGE 3 CHRONIC KIDNEY DISEASE, UNSPECIFIED WHETHER STAGE 3A OR 3B CKD: ICD-10-CM

## 2022-11-29 LAB
BASOPHILS # BLD AUTO: 0.02 10*3/MM3 (ref 0–0.2)
BASOPHILS NFR BLD AUTO: 0.3 % (ref 0–1.5)
DEPRECATED RDW RBC AUTO: 42.3 FL (ref 37–54)
EOSINOPHIL # BLD AUTO: 0.12 10*3/MM3 (ref 0–0.4)
EOSINOPHIL NFR BLD AUTO: 2 % (ref 0.3–6.2)
ERYTHROCYTE [DISTWIDTH] IN BLOOD BY AUTOMATED COUNT: 12.3 % (ref 12.3–15.4)
HCT VFR BLD AUTO: 44.5 % (ref 34–46.6)
HGB BLD-MCNC: 14 G/DL (ref 12–15.9)
IMM GRANULOCYTES # BLD AUTO: 0.01 10*3/MM3 (ref 0–0.05)
IMM GRANULOCYTES NFR BLD AUTO: 0.2 % (ref 0–0.5)
LYMPHOCYTES # BLD AUTO: 1.4 10*3/MM3 (ref 0.7–3.1)
LYMPHOCYTES NFR BLD AUTO: 23.1 % (ref 19.6–45.3)
MCH RBC QN AUTO: 29.1 PG (ref 26.6–33)
MCHC RBC AUTO-ENTMCNC: 31.5 G/DL (ref 31.5–35.7)
MCV RBC AUTO: 92.5 FL (ref 79–97)
MONOCYTES # BLD AUTO: 0.47 10*3/MM3 (ref 0.1–0.9)
MONOCYTES NFR BLD AUTO: 7.8 % (ref 5–12)
NEUTROPHILS NFR BLD AUTO: 4.04 10*3/MM3 (ref 1.7–7)
NEUTROPHILS NFR BLD AUTO: 66.6 % (ref 42.7–76)
PLATELET # BLD AUTO: 251 10*3/MM3 (ref 140–450)
PMV BLD AUTO: 9.9 FL (ref 6–12)
RBC # BLD AUTO: 4.81 10*6/MM3 (ref 3.77–5.28)
WBC NRBC COR # BLD: 6.06 10*3/MM3 (ref 3.4–10.8)

## 2022-11-29 PROCEDURE — 80053 COMPREHEN METABOLIC PANEL: CPT

## 2022-11-29 PROCEDURE — 36415 COLL VENOUS BLD VENIPUNCTURE: CPT

## 2022-11-29 PROCEDURE — 85025 COMPLETE CBC W/AUTO DIFF WBC: CPT

## 2022-11-30 LAB
ALBUMIN SERPL-MCNC: 4.2 G/DL (ref 3.5–5.2)
ALBUMIN/GLOB SERPL: 1.6 G/DL
ALP SERPL-CCNC: 59 U/L (ref 39–117)
ALT SERPL W P-5'-P-CCNC: 17 U/L (ref 1–33)
ANION GAP SERPL CALCULATED.3IONS-SCNC: 7.1 MMOL/L (ref 5–15)
AST SERPL-CCNC: 26 U/L (ref 1–32)
BILIRUB SERPL-MCNC: 0.4 MG/DL (ref 0–1.2)
BUN SERPL-MCNC: 13 MG/DL (ref 8–23)
BUN/CREAT SERPL: 13 (ref 7–25)
CALCIUM SPEC-SCNC: 9.5 MG/DL (ref 8.6–10.5)
CHLORIDE SERPL-SCNC: 104 MMOL/L (ref 98–107)
CO2 SERPL-SCNC: 26.9 MMOL/L (ref 22–29)
CREAT SERPL-MCNC: 1 MG/DL (ref 0.57–1)
EGFRCR SERPLBLD CKD-EPI 2021: 58.1 ML/MIN/1.73
GLOBULIN UR ELPH-MCNC: 2.6 GM/DL
GLUCOSE SERPL-MCNC: 103 MG/DL (ref 65–99)
POTASSIUM SERPL-SCNC: 4.8 MMOL/L (ref 3.5–5.2)
PROT SERPL-MCNC: 6.8 G/DL (ref 6–8.5)
SODIUM SERPL-SCNC: 138 MMOL/L (ref 136–145)

## 2022-12-06 ENCOUNTER — OFFICE VISIT (OUTPATIENT)
Dept: CARDIOLOGY | Facility: CLINIC | Age: 77
End: 2022-12-06

## 2022-12-06 VITALS
HEIGHT: 62 IN | DIASTOLIC BLOOD PRESSURE: 74 MMHG | BODY MASS INDEX: 24.66 KG/M2 | HEART RATE: 71 BPM | SYSTOLIC BLOOD PRESSURE: 114 MMHG | WEIGHT: 134 LBS

## 2022-12-06 DIAGNOSIS — R00.2 PALPITATIONS: Primary | ICD-10-CM

## 2022-12-06 DIAGNOSIS — E78.2 MIXED HYPERLIPIDEMIA: ICD-10-CM

## 2022-12-06 PROCEDURE — 99213 OFFICE O/P EST LOW 20 MIN: CPT | Performed by: INTERNAL MEDICINE

## 2022-12-06 NOTE — PROGRESS NOTES
Chief Complaint  Palpitations    Subjective        Dacia Napier presents to [facility]  History of present illness:    Patient states she has not had any of the palpitations since we saw her last.  She states they will come on for a couple weeks and then be gone for a long period of time.  She does note if she presses on her carotid she can stop the palpitations.  She denies any exertional chest pain.      Past Medical History:   Diagnosis Date   • Anemia 06/2021    Iron prescription   • Arrhythmia    • Colon polyp    • Diabetes mellitus (HCC)    • GERD (gastroesophageal reflux disease)    • Hiatal hernia    • Hyperlipidemia    • Lung nodule 7/2022    On my ct scan at [facility]   • Rapid or irregular heartbeat    • Seasonal allergies    • Type 2 diabetes mellitus (HCC)          Past Surgical History:   Procedure Laterality Date   • CARDIAC CATHETERIZATION     • CATARACT EXTRACTION, BILATERAL     • COLONOSCOPY     • COLONOSCOPY N/A 06/25/2021    Procedure: COLONOSCOPY;  Surgeon: Ty Partida MD;  Location: [facility] ENDOSCOPY;  Service: General;  Laterality: N/A;  Colon polyp   • ENDOSCOPY N/A 06/25/2021    Procedure: ESOPHAGOGASTRODUODENOSCOPY;  Surgeon: Ty Partida MD;  Location: [facility] ENDOSCOPY;  Service: General;  Laterality: N/A;  Hiatal Hernia   • EYE CAPSULOTOMY WITH LASER     • HERNIA REPAIR     • HYSTERECTOMY     • LASIK     • TONSILLECTOMY            Social History     Socioeconomic History   • Marital status:    Tobacco Use   • Smoking status: Never   • Smokeless tobacco: Never   Vaping Use   • Vaping Use: Never used   Substance and Sexual Activity   • Alcohol use: Not Currently     Comment: Maybe couple times year   • Drug use: Never   • Sexual activity: Not Currently     Partners: Male     Birth control/protection: Condom, Pill, Diaphragm, Spermicide         Family History   Problem Relation Age of Onset   • Heart disease Mother    • Heart failure Mother          Congestive heart failure   • Heart disease Father    • Heart failure Father         3 heart attacks in 6 years   • Heart disease Sister    • Cancer Sister         Breast cancer   • Heart disease Brother    • Cancer Brother         Tongue cancer   • Heart failure Brother         Pace maker defibrillator and stents at 40   • Cancer Sister         Melanoma skin cancer   • Heart failure Brother         By pass   • Heart failure Sister         5 by pass   • Malig Hyperthermia Neg Hx           No Known Allergies         Current Outpatient Medications:   •  Accu-Chek Softclix Lancets lancets, 1 each by Other route 2 (Two) Times a Day. Use as instructed, Disp: 100 each, Rfl: 3  •  aspirin (aspirin) 81 MG EC tablet, Daily., Disp: , Rfl:   •  Calcium Carbonate 1500 (600 Ca) MG tablet, 2 (Two) Times a Day With Meals., Disp: , Rfl:   •  cetirizine (zyrTEC) 10 MG tablet, Take 10 mg by mouth Daily As Needed., Disp: , Rfl:   •  Cholecalciferol (Vitamin D3) 50 MCG (2000 UT) tablet, Take 2,000 Units by mouth Daily., Disp: , Rfl:   •  estradiol (ESTRACE) 0.1 MG/GM vaginal cream, Insert 2 g into the vagina Daily., Disp: 42.5 g, Rfl: 3  •  ezetimibe (ZETIA) 10 MG tablet, Take 1 tablet by mouth Daily., Disp: 90 tablet, Rfl: 1  •  FeroSul 325 (65 Fe) MG tablet, TAKE 1 TABLET BY MOUTH 2 TIMES DAILY (Patient taking differently: Take 325 mg by mouth Every Other Day.), Disp: 60 tablet, Rfl: 3  •  fluticasone (FLONASE) 50 MCG/ACT nasal spray, SHAKE LIQUID AND USE 2 SPRAYS IN EACH NOSTRIL EVERY DAY AS DIRECTED, Disp: 16 g, Rfl: 3  •  glucose blood (Accu-Chek Guide) test strip, 1 each by Other route 2 (Two) Times a Day., Disp: 100 each, Rfl: 3  •  glucose blood test strip, 1 each by Other route 2 (Two) Times a Day. Check blood sugar bid, Disp: 100 each, Rfl: 11  •  Omega-3 1000 MG capsule, Take 1,000 mg by mouth Daily., Disp: , Rfl:   •  omeprazole (priLOSEC) 20 MG capsule, Take 1 capsule by mouth Daily., Disp: 90 capsule, Rfl: 1  •   "rosuvastatin (CRESTOR) 10 MG tablet, Take 1 tablet by mouth Every Night., Disp: 90 tablet, Rfl: 1  •  Tirzepatide 2.5 MG/0.5ML solution pen-injector, Inject 2.5 mg under the skin into the appropriate area as directed Every 7 (Seven) Days., Disp: 2 mL, Rfl: 0  •  valACYclovir (VALTREX) 500 MG tablet, Take 500 mg by mouth As Needed., Disp: , Rfl:     Current Facility-Administered Medications:   •  denosumab (PROLIA) syringe 60 mg, 60 mg, Subcutaneous, Q6 Months, Mata Love MD, 60 mg at 09/19/22 1509      ROS:  Cardiac review of systems negative    Objective     /74   Pulse 71   Ht 157.5 cm (62\")   Wt 60.8 kg (134 lb)   BMI 24.51 kg/m²       General Appearance:   · well developed  · well nourished  HENT:   · oropharynx moist  · lips not cyanotic  Respiratory:  · no respiratory distress  · normal breath sounds  · no rales  Cardiovascular:  · no jugular venous distention  · regular rhythm  · S1 normal, S2 normal  · no S3, no S4   · no murmur  · no rub, no thrill  · No carotid bruit  · pedal pulses normal  · lower extremity edema: none    Musculoskeletal:  · no clubbing of fingers.   · normocephalic, head atraumatic  Skin:   · warm, dry  Psychiatric:  · judgement and insight appropriate  · normal mood and affect    ECHO:  Results for orders placed in visit on 05/27/22    Adult Transthoracic Echo Complete W/ Cont if Necessary Per Protocol    Interpretation Summary  · Estimated left ventricular EF was in agreement with the calculated left ventricular EF. Left ventricular ejection fraction appears to be 61 - 65%. Left ventricular systolic function is normal.  · Left ventricular diastolic function is consistent with (grade I) impaired relaxation.  · Mild aortic valve and trace mitral valve regurgitation is noted.    STRESS:    CATH:  No results found for this or any previous visit.    BMP:   Glucose   Date Value Ref Range Status   11/29/2022 103 (H) 65 - 99 mg/dL Final     BUN   Date Value Ref Range " Status   11/29/2022 13 8 - 23 mg/dL Final     Creatinine   Date Value Ref Range Status   11/29/2022 1.00 0.57 - 1.00 mg/dL Final     Sodium   Date Value Ref Range Status   11/29/2022 138 136 - 145 mmol/L Final     Potassium   Date Value Ref Range Status   11/29/2022 4.8 3.5 - 5.2 mmol/L Final     Chloride   Date Value Ref Range Status   11/29/2022 104 98 - 107 mmol/L Final     CO2   Date Value Ref Range Status   11/29/2022 26.9 22.0 - 29.0 mmol/L Final     Calcium   Date Value Ref Range Status   11/29/2022 9.5 8.6 - 10.5 mg/dL Final     BUN/Creatinine Ratio   Date Value Ref Range Status   11/29/2022 13.0 7.0 - 25.0 Final     Anion Gap   Date Value Ref Range Status   11/29/2022 7.1 5.0 - 15.0 mmol/L Final     eGFR   Date Value Ref Range Status   11/29/2022 58.1 (L) >60.0 mL/min/1.73 Final     Comment:     National Kidney Foundation and American Society of Nephrology (ASN) Task Force recommended calculation based on the Chronic Kidney Disease Epidemiology Collaboration (CKD-EPI) equation refit without adjustment for race.     LIPIDS:  Total Cholesterol   Date Value Ref Range Status   08/24/2022 172 0 - 200 mg/dL Final     Triglycerides   Date Value Ref Range Status   08/24/2022 296 (H) 0 - 150 mg/dL Final     HDL Cholesterol   Date Value Ref Range Status   08/24/2022 40 40 - 60 mg/dL Final     LDL Cholesterol    Date Value Ref Range Status   08/24/2022 83 0 - 100 mg/dL Final     VLDL Cholesterol   Date Value Ref Range Status   08/24/2022 49 (H) 5 - 40 mg/dL Final     LDL/HDL Ratio   Date Value Ref Range Status   08/24/2022 1.82  Final         Procedures             ASSESSMENT:  Encounter Diagnoses   Name Primary?   • Palpitations Yes   • Mixed hyperlipidemia          PLAN:    1.  Patient's palpitations sound most consistent with a paroxysmal SVT.  She had extensive work-up done down in [place].  They did at 1 point talk about an ablation but she did not have this done.  She notes very rare palpitations at this time that  she is able to stop with pressing on her carotid.  We will just continue to monitor.  2.  Continue the Crestor and Zetia.  Patient cholesterol checked 8/24/2022 with LDL 83, HDL 40, and triglycerides 296.  3.  Blood pressures under good control.  4.  Encourage the patient to continue to remain active and call us if any exertional cardiac complaints.  5.  Patient had echocardiogram done 5/27/2022 with normal ejection fraction and mild aortic insufficiency.          Patient was given instructions and counseling regarding her condition or for health maintenance advice. Please see specific information pulled into the AVS if appropriate.         Noel Estrella MD   12/6/2022  12:13 EST

## 2022-12-09 ENCOUNTER — HOSPITAL ENCOUNTER (OUTPATIENT)
Dept: RESPIRATORY THERAPY | Facility: HOSPITAL | Age: 77
Discharge: HOME | End: 2022-12-09
Admitting: INTERNAL MEDICINE

## 2022-12-09 DIAGNOSIS — R05.9 COUGH: ICD-10-CM

## 2022-12-09 DIAGNOSIS — J20.9 ACUTE BRONCHITIS, UNSPECIFIED ORGANISM: ICD-10-CM

## 2022-12-09 DIAGNOSIS — U09.9 LONG COVID: ICD-10-CM

## 2022-12-09 PROCEDURE — 94060 EVALUATION OF WHEEZING: CPT

## 2022-12-09 PROCEDURE — 94726 PLETHYSMOGRAPHY LUNG VOLUMES: CPT | Performed by: INTERNAL MEDICINE

## 2022-12-09 PROCEDURE — 94060 EVALUATION OF WHEEZING: CPT | Performed by: INTERNAL MEDICINE

## 2022-12-09 PROCEDURE — 94726 PLETHYSMOGRAPHY LUNG VOLUMES: CPT

## 2022-12-09 PROCEDURE — 94729 DIFFUSING CAPACITY: CPT

## 2022-12-09 PROCEDURE — 94729 DIFFUSING CAPACITY: CPT | Performed by: INTERNAL MEDICINE

## 2022-12-09 RX ORDER — ALBUTEROL SULFATE 2.5 MG/3ML
2.5 SOLUTION RESPIRATORY (INHALATION) ONCE
Status: COMPLETED | OUTPATIENT
Start: 2022-12-09 | End: 2022-12-09

## 2022-12-09 RX ADMIN — ALBUTEROL SULFATE 2.5 MG: 2.5 SOLUTION RESPIRATORY (INHALATION) at 14:49

## 2022-12-15 ENCOUNTER — LAB (OUTPATIENT)
Dept: LAB | Facility: HOSPITAL | Age: 77
End: 2022-12-15

## 2022-12-15 ENCOUNTER — TELEPHONE (OUTPATIENT)
Dept: UROLOGY | Facility: CLINIC | Age: 77
End: 2022-12-15

## 2022-12-15 DIAGNOSIS — R30.0 DYSURIA: Primary | ICD-10-CM

## 2022-12-15 DIAGNOSIS — R30.0 DYSURIA: ICD-10-CM

## 2022-12-15 PROCEDURE — 87086 URINE CULTURE/COLONY COUNT: CPT

## 2022-12-15 NOTE — TELEPHONE ENCOUNTER
Patient called back and I relayed message that order for urine culture has been placed in her chart. She voiced understanding.

## 2022-12-17 LAB — BACTERIA SPEC AEROBE CULT: NO GROWTH

## 2022-12-23 ENCOUNTER — PATIENT MESSAGE (OUTPATIENT)
Dept: DIABETES SERVICES | Facility: HOSPITAL | Age: 77
End: 2022-12-23

## 2022-12-26 RX ORDER — TIRZEPATIDE 5 MG/.5ML
5 INJECTION, SOLUTION SUBCUTANEOUS
Qty: 2 ML | Refills: 1 | Status: SHIPPED | OUTPATIENT
Start: 2022-12-26 | End: 2023-01-22 | Stop reason: SDUPTHER

## 2022-12-26 NOTE — TELEPHONE ENCOUNTER
From: Dacia Napier  To: Annabelle Buckley  Sent: 12/23/2022 2:46 PM EST  Subject: Mounjaro     You said you wanted me to go on the Mounjaro 5 mg on the next month’s dosage. I will need a prescription for the Mounjaro 5 mg the first part of January. Could you go on and send the prescription to [facility] in [facility]?

## 2023-01-05 ENCOUNTER — OFFICE VISIT (OUTPATIENT)
Dept: PULMONOLOGY | Facility: CLINIC | Age: 78
End: 2023-01-05
Payer: MEDICARE

## 2023-01-05 VITALS
HEIGHT: 62 IN | RESPIRATION RATE: 18 BRPM | WEIGHT: 130.6 LBS | SYSTOLIC BLOOD PRESSURE: 100 MMHG | BODY MASS INDEX: 24.03 KG/M2 | TEMPERATURE: 98.7 F | DIASTOLIC BLOOD PRESSURE: 69 MMHG | HEART RATE: 106 BPM | OXYGEN SATURATION: 92 %

## 2023-01-05 DIAGNOSIS — J04.0 LARYNGITIS: ICD-10-CM

## 2023-01-05 DIAGNOSIS — J20.9 ACUTE BRONCHITIS, UNSPECIFIED ORGANISM: Primary | ICD-10-CM

## 2023-01-05 PROCEDURE — 99214 OFFICE O/P EST MOD 30 MIN: CPT | Performed by: INTERNAL MEDICINE

## 2023-01-05 RX ORDER — AZITHROMYCIN 250 MG/1
TABLET, FILM COATED ORAL
Qty: 6 TABLET | Refills: 0 | Status: SHIPPED | OUTPATIENT
Start: 2023-01-05 | End: 2023-01-10 | Stop reason: SDUPTHER

## 2023-01-05 RX ORDER — BENZONATATE 100 MG/1
100 CAPSULE ORAL 2 TIMES DAILY PRN
Qty: 30 CAPSULE | Refills: 0 | Status: SHIPPED | OUTPATIENT
Start: 2023-01-05 | End: 2023-03-12 | Stop reason: SDUPTHER

## 2023-01-05 RX ORDER — PREDNISONE 20 MG/1
40 TABLET ORAL DAILY
Qty: 14 TABLET | Refills: 0 | Status: SHIPPED | OUTPATIENT
Start: 2023-01-05 | End: 2023-01-10 | Stop reason: SDUPTHER

## 2023-01-05 NOTE — PROGRESS NOTES
Pulmonary Office Follow-up    Subjective     Dacia Napier is seen today at the office for   Chief Complaint   Patient presents with   • Cough   • Wheezing   • Follow-up     Started feeling bad Tuesday night         HPI  Dacia Napier is a 77 y.o. female with a PMH significant for COVID 19 infection presents with cough wheezing and shortness of breath along with hoarseness of voice over the past week after she took a cold patient continues to remain congested and wheezy      Tobacco use history:  Never smoker      Patient Active Problem List   Diagnosis   • Postmenopausal atrophic vaginitis   • Agenesis of right kidney   • Type 2 diabetes mellitus without complication (HCC)   • Hiatal hernia with GERD without esophagitis   • High cholesterol   • Postmenopausal osteoporosis   • Recurrent urinary tract infection   • Other allergic rhinitis   • Palpitations   • Heart murmur (neg ECHO in 2021)    • Stage 3b chronic kidney disease (HCC)   • Medicare annual wellness visit, subsequent   • Urgency incontinence       Review of Systems  Review of Systems   Constitutional: Positive for fatigue.   HENT: Positive for postnasal drip, rhinorrhea and voice change.    Respiratory: Positive for cough, shortness of breath and wheezing.    All other systems reviewed and are negative.    As described in the HPI. Otherwise, remainder of ROS (14 systems) were negative.    Medications, Allergies, Social, and Family Histories reviewed as per EMR.    Objective     Vitals:    01/05/23 1540   BP: 100/69   Pulse: 106   Resp: 18   Temp: 98.7 °F (37.1 °C)   SpO2: 92%         01/05/23  1540   Weight: 59.2 kg (130 lb 9.6 oz)       Physical Exam  Vitals and nursing note reviewed.   Constitutional:       Appearance: She is ill-appearing.   HENT:      Head: Normocephalic and atraumatic.      Nose: Congestion and rhinorrhea present.      Mouth/Throat:      Mouth: Mucous membranes are moist.      Pharynx: Oropharynx is clear.   Eyes:       Conjunctiva/sclera: Conjunctivae normal.      Pupils: Pupils are equal, round, and reactive to light.   Cardiovascular:      Rate and Rhythm: Normal rate and regular rhythm.      Pulses: Normal pulses.      Heart sounds: Normal heart sounds.   Pulmonary:      Effort: Pulmonary effort is normal.      Breath sounds: Wheezing and rhonchi present.   Abdominal:      General: Abdomen is flat. Bowel sounds are normal.      Palpations: Abdomen is soft.   Musculoskeletal:         General: Normal range of motion.      Cervical back: Normal range of motion and neck supple.   Skin:     General: Skin is warm.      Capillary Refill: Capillary refill takes less than 2 seconds.   Neurological:      General: No focal deficit present.      Mental Status: She is alert and oriented to person, place, and time.   Psychiatric:         Mood and Affect: Mood normal.         Behavior: Behavior normal.         No radiology results for the last 90 days.     Assessment & Plan     Diagnoses and all orders for this visit:    1. Acute bronchitis, unspecified organism (Primary)    2. Laryngitis    Other orders  -     azithromycin (ZITHROMAX) 250 MG tablet; Take 2 by mouth today then 1 daily for 4 days  Dispense: 6 tablet; Refill: 0  -     predniSONE (DELTASONE) 20 MG tablet; Take 2 tablets by mouth Daily.  Dispense: 14 tablet; Refill: 0         Discussion/ Recommendations:   We will start her on Zithromax Z-Oliver  Prednisone 20 mg daily for 1 week  Advised plenty of fluids  Vaccinations discussed and recommended    BMI is within normal parameters. No other follow-up for BMI required.        Return in about 3 months (around 4/5/2023).          This document has been electronically signed by Johan De Jesus MD on January 5, 2023 15:46 EST

## 2023-01-10 RX ORDER — AZITHROMYCIN 250 MG/1
TABLET, FILM COATED ORAL
Qty: 6 TABLET | Refills: 0 | Status: SHIPPED | OUTPATIENT
Start: 2023-01-10 | End: 2023-01-23

## 2023-01-10 RX ORDER — PREDNISONE 20 MG/1
40 TABLET ORAL DAILY
Qty: 14 TABLET | Refills: 0 | Status: SHIPPED | OUTPATIENT
Start: 2023-01-10 | End: 2023-01-23

## 2023-01-11 DIAGNOSIS — E11.9 TYPE 2 DIABETES MELLITUS WITHOUT COMPLICATION, WITHOUT LONG-TERM CURRENT USE OF INSULIN: ICD-10-CM

## 2023-01-11 DIAGNOSIS — E78.00 HIGH CHOLESTEROL: ICD-10-CM

## 2023-01-11 RX ORDER — ROSUVASTATIN CALCIUM 10 MG/1
10 TABLET, COATED ORAL NIGHTLY
Qty: 90 TABLET | Refills: 1 | Status: SHIPPED | OUTPATIENT
Start: 2023-01-11

## 2023-01-11 RX ORDER — EZETIMIBE 10 MG/1
10 TABLET ORAL DAILY
Qty: 90 TABLET | Refills: 1 | Status: SHIPPED | OUTPATIENT
Start: 2023-01-11

## 2023-01-13 ENCOUNTER — TELEMEDICINE (OUTPATIENT)
Dept: FAMILY MEDICINE CLINIC | Age: 78
End: 2023-01-13
Payer: MEDICARE

## 2023-01-13 DIAGNOSIS — R00.2 PALPITATIONS: ICD-10-CM

## 2023-01-13 DIAGNOSIS — E78.00 HIGH CHOLESTEROL: ICD-10-CM

## 2023-01-13 DIAGNOSIS — B00.1 RECURRENT COLD SORES: ICD-10-CM

## 2023-01-13 DIAGNOSIS — N95.2 POSTMENOPAUSAL ATROPHIC VAGINITIS: Primary | ICD-10-CM

## 2023-01-13 DIAGNOSIS — E11.9 TYPE 2 DIABETES MELLITUS WITHOUT COMPLICATION, WITHOUT LONG-TERM CURRENT USE OF INSULIN: ICD-10-CM

## 2023-01-13 PROBLEM — Z00.00 MEDICARE ANNUAL WELLNESS VISIT, SUBSEQUENT: Status: RESOLVED | Noted: 2022-07-14 | Resolved: 2023-01-13

## 2023-01-13 PROCEDURE — 99214 OFFICE O/P EST MOD 30 MIN: CPT | Performed by: FAMILY MEDICINE

## 2023-01-13 NOTE — ASSESSMENT & PLAN NOTE
Diabetes is improving with treatment.   Continue current treatment regimen.  Diabetes will be reassessed in 6 months   NOT QUITE AT GOAL BUT IMPROVED.  FURTHER PLANS PER MS. DAILEY .

## 2023-01-13 NOTE — PROGRESS NOTES
Mode of Visit: Video  You have chosen to receive care through a telehealth visit.  Do you consent to use a video/audio connection for your medical care today? Yes   The visit included audio and video interaction. No technical issues occurred during this visit.    Subjective    PRESENT:  JOSE NAPIER IN HER HOME, GULSHAN RAMOS MD IN THE OFFICE   Chief Complaint   Patient presents with   • Diabetes     Check up; dox video [phone]         HPI: { CC  Problem List  ROS  Visit Diagnosis  Labs  Encounters  Imaging  Media  Notes:23}    --LAST HGA1C WAS 7.5 %, NOW ON NEW MED FROM JOE PADDY  --LAST LIPIDS WERE OK, NO ISSUES WITH MEDS  --THE VALTREX DOES WELL FOR HER RECURRENT WALTER SORES  --NO PALPITATIONS RECENTLY, NEGATIVE CARDIOLOGY EVAL  --THE VAGINAL BURNING IS ONLY SLIGHTLY IMPROVED WITH THE ESTROGEN CREAM     Jose Napier is a 77 y.o. female who presents to  [facility]  [facility] Care today     Review of Systems   Constitutional: Negative for activity change, appetite change, chills, fatigue and fever.   HENT: Negative for congestion, ear pain, hearing loss, rhinorrhea and sore throat.    Eyes: Negative for discharge and visual disturbance.   Respiratory: Negative for cough and shortness of breath.    Cardiovascular: Negative for chest pain, palpitations and leg swelling.   Gastrointestinal: Negative for abdominal pain, diarrhea, nausea and vomiting.   Genitourinary: Negative for dysuria and hematuria.   Musculoskeletal: Negative for arthralgias and myalgias.   Psychiatric/Behavioral: Negative for dysphoric mood.            PE:   Objective   There were no vitals taken for this visit.    There is no height or weight on file to calculate BMI.    Physical Exam   Constitutional: She appears well-developed and well-nourished.   HENT:   Head: Normocephalic.   Pulmonary/Chest: Effort normal.   Neurological: She is alert.   Psychiatric: She has a normal mood and  affect.        The data below has been reviewed by Mata Love MD on 01/13/2023.     Data Reviewed:                A/P: {Problem List  Visit Diagnosis  [facility]  Medications :23}    Assessment & Plan   Diagnoses and all orders for this visit:    1. Postmenopausal atrophic vaginitis (Primary)  Assessment & Plan:  NOT AT GOAL, WILL SEND TO GYN FOR FURTHER EVAL     Orders:  -     Ambulatory Referral to Obstetrics / Gynecology    2. Type 2 diabetes mellitus without complication, without long-term current use of insulin (HCC)  Assessment & Plan:  Diabetes is improving with treatment.   Continue current treatment regimen.  Diabetes will be reassessed in 6 months   NOT QUITE AT GOAL BUT IMPROVED.  FURTHER PLANS PER MS. DAILEY .      3. Recurrent cold sores  Assessment & Plan:  IMPROVED WITH CURRENT TREATMENT, CONTINUE SAME, WILL REEVALUATE AT NEXT VISIT       4. High cholesterol  Assessment & Plan:  Lipid abnormalities are improving with treatment.  Nutritional counseling was provided.  Lipids will be reassessed in 6 months.      5. Palpitations (sees Cardiology--negative w/u)   Assessment & Plan:  CURRENTLY IMPROVED.  ECHO AND CARDIOLOGY NOTES REVIEWED               Follow up:{  ap  LOS  Follow-up  Instructions  Communications :23}    Return in about 6 months (around 7/13/2023).           Answers for HPI/ROS submitted by the patient on 1/13/2023  What is the primary reason for your visit?: Physical

## 2023-01-13 NOTE — PROGRESS NOTES
Mode of Visit: Video  You have chosen to receive care through a telehealth visit.  Do you consent to use a video/audio connection for your medical care today? Yes   The visit included audio and video interaction. No technical issues occurred during this visit.    Subjective    PRESENT:  JOSE NAPIER IN HER HOME, MATA RAMOS MD AT THE OFFICE   Chief Complaint   Patient presents with   • Diabetes     Check up; dox video [phone]         HPI:     --LAST HGA1C WAS 7.5 %, ON A NEW MED FROM JOE PADODALIS  --LAST LIPIDS WERE OK, NO ISSUES WITH MEDS  --NO MORE PALPITATIONS, NEGATIVE CARDIOLOGY EVAL  --WORSENING VAGINAL BURNING DESPITE THE ESTROGEN CREAM  --COLD SORES ARE DOING WELL WITH THE PRN VALTREX     Jose Napier is a 77 y.o. female who presents to  [facility] 2  [facility] Virtual Care today     Review of Systems   Constitutional: Negative for activity change, appetite change, chills, fatigue and fever.   HENT: Negative for congestion, ear pain, hearing loss, rhinorrhea and sore throat.    Eyes: Negative for discharge and visual disturbance.   Respiratory: Negative for cough and shortness of breath.    Cardiovascular: Negative for chest pain, palpitations and leg swelling.   Gastrointestinal: Negative for abdominal pain, diarrhea, nausea and vomiting.   Genitourinary: Negative for dysuria and hematuria.   Musculoskeletal: Negative for arthralgias and myalgias.   Psychiatric/Behavioral: Negative for dysphoric mood.            PE:   Objective   There were no vitals taken for this visit.    There is no height or weight on file to calculate BMI.    Physical Exam   Constitutional: She appears well-developed and well-nourished.   HENT:   Head: Normocephalic.   Pulmonary/Chest: Effort normal.   Neurological: She is alert.   Psychiatric: She has a normal mood and affect.        The data below has been reviewed by Mata Ramos MD on 01/13/2023.     Data Reviewed:                 A/P:     Assessment & Plan   Diagnoses and all orders for this visit:    1. Postmenopausal atrophic vaginitis (Primary)  Assessment & Plan:  NOT AT GOAL, WILL SEND TO GYN FOR FURTHER EVAL     Orders:  -     Ambulatory Referral to Obstetrics / Gynecology    2. Type 2 diabetes mellitus without complication, without long-term current use of insulin (HCC)  Assessment & Plan:  Diabetes is improving with treatment.   Continue current treatment regimen.  Diabetes will be reassessed in 6 months   NOT QUITE AT GOAL BUT IMPROVED.  FURTHER PLANS PER MS. DAILEY .      3. Recurrent cold sores  Assessment & Plan:  IMPROVED WITH CURRENT TREATMENT, CONTINUE SAME, WILL REEVALUATE AT NEXT VISIT       4. High cholesterol  Assessment & Plan:  Lipid abnormalities are improving with treatment.  Nutritional counseling was provided.  Lipids will be reassessed in 6 months.      5. Palpitations (sees Cardiology--negative w/u)   Assessment & Plan:  CURRENTLY IMPROVED.  ECHO AND CARDIOLOGY NOTES REVIEWED                 Follow up:    Return in about 6 months (around 7/13/2023).           Answers for HPI/ROS submitted by the patient on 1/13/2023  What is the primary reason for your visit?: Physical

## 2023-01-16 ENCOUNTER — TELEPHONE (OUTPATIENT)
Dept: FAMILY MEDICINE CLINIC | Age: 78
End: 2023-01-16
Payer: MEDICARE

## 2023-01-16 NOTE — TELEPHONE ENCOUNTER
CT scan with contrast repeat in 6 months due to a pul nodule      CT Chest With Contrast Diagnostic (07/19/2022 10:48)

## 2023-01-17 ENCOUNTER — DOCUMENTATION (OUTPATIENT)
Dept: FAMILY MEDICINE CLINIC | Age: 78
End: 2023-01-17
Payer: MEDICARE

## 2023-01-17 DIAGNOSIS — R91.1 LUNG NODULE: Primary | ICD-10-CM

## 2023-01-22 ENCOUNTER — PATIENT MESSAGE (OUTPATIENT)
Dept: DIABETES SERVICES | Facility: HOSPITAL | Age: 78
End: 2023-01-22
Payer: MEDICARE

## 2023-01-22 RX ORDER — TIRZEPATIDE 5 MG/.5ML
5 INJECTION, SOLUTION SUBCUTANEOUS
Qty: 2 ML | Refills: 1 | Status: SHIPPED | OUTPATIENT
Start: 2023-01-22 | End: 2023-02-27

## 2023-01-23 ENCOUNTER — OFFICE VISIT (OUTPATIENT)
Dept: UROLOGY | Facility: CLINIC | Age: 78
End: 2023-01-23
Payer: MEDICARE

## 2023-01-23 VITALS — HEIGHT: 63 IN | WEIGHT: 125.2 LBS | BODY MASS INDEX: 22.18 KG/M2 | RESPIRATION RATE: 16 BRPM

## 2023-01-23 DIAGNOSIS — N30.80 CYSTITIS CYSTICA: ICD-10-CM

## 2023-01-23 DIAGNOSIS — N39.0 RECURRENT URINARY TRACT INFECTION: ICD-10-CM

## 2023-01-23 DIAGNOSIS — N39.41 URGENCY INCONTINENCE: ICD-10-CM

## 2023-01-23 DIAGNOSIS — N95.2 POSTMENOPAUSAL ATROPHIC VAGINITIS: Primary | ICD-10-CM

## 2023-01-23 LAB
BILIRUB BLD-MCNC: ABNORMAL MG/DL
CLARITY, POC: ABNORMAL
COLOR UR: ABNORMAL
EXPIRATION DATE: ABNORMAL
GLUCOSE UR STRIP-MCNC: NEGATIVE MG/DL
KETONES UR QL: ABNORMAL
LEUKOCYTE EST, POC: ABNORMAL
Lab: ABNORMAL
NITRITE UR-MCNC: POSITIVE MG/ML
PH UR: 5.5 [PH] (ref 5–8)
PROT UR STRIP-MCNC: ABNORMAL MG/DL
RBC # UR STRIP: ABNORMAL /UL
SP GR UR: 1.02 (ref 1–1.03)
URINE VOLUME: 34
UROBILINOGEN UR QL: NORMAL

## 2023-01-23 PROCEDURE — 87077 CULTURE AEROBIC IDENTIFY: CPT | Performed by: NURSE PRACTITIONER

## 2023-01-23 PROCEDURE — 87086 URINE CULTURE/COLONY COUNT: CPT | Performed by: NURSE PRACTITIONER

## 2023-01-23 PROCEDURE — 81003 URINALYSIS AUTO W/O SCOPE: CPT | Performed by: NURSE PRACTITIONER

## 2023-01-23 PROCEDURE — 51798 US URINE CAPACITY MEASURE: CPT | Performed by: NURSE PRACTITIONER

## 2023-01-23 PROCEDURE — 87186 SC STD MICRODIL/AGAR DIL: CPT | Performed by: NURSE PRACTITIONER

## 2023-01-23 PROCEDURE — 99214 OFFICE O/P EST MOD 30 MIN: CPT | Performed by: NURSE PRACTITIONER

## 2023-01-23 RX ORDER — FLUCONAZOLE 150 MG/1
150 TABLET ORAL DAILY
Qty: 3 TABLET | Refills: 0 | Status: SHIPPED | OUTPATIENT
Start: 2023-01-23 | End: 2023-04-03

## 2023-01-23 NOTE — PROGRESS NOTES
"Chief Complaint: Urinary Urgency    Subjective         History of Present Illness  Dacia Napier is a 77 y.o. female presents to [facility] to be seen for f/u recurrent UTI.      Patient has had normal urine culture that was positive since we saw her last..     Patient was started on Gemtesa in August 2022.  The medication was working well for the patient however she could not afford the monthly cost of this medicine. she had a cystoscopy with Dr. Paredes on 9/26/2022 which revealed only cystitis cystica she was recommended to continue to drink plenty of fluids and to continue Gemtesa 75 mg daily with a 3-month follow-up.    Patient reached out via [employer] on October 30, 2022 and stated that Gemtesa was making the patient to have bladder spasms and constantly having to urinate and having discomfort when she went.  She called the office on 11/3/2022 and had a urine culture ordered and performed.  She was found to have a urinary tract infection Klebsiella pneumoniae and was prescribed levofloxacin and Florajen.    She called again on 12/14/2022 and requested urine culture and urinalysis for potential UTI however urine culture was negative.    She states she feels as if she has a \"yeast infection\"    She states that she stopped the gemtesa due to adverse effects of feeling like it dried her out.     She is still using estrace. She is seeing gynecology today.     Previous:    She states she is with burning and pain with urination as well as urinary frequency and back aches and pressure in lower abdomen.     She is using vaginal estrace just started using again a week ago.    She still continues to have pain and symptoms when she is with negative CX.     She is with new onset urge incontinence and with frequency.     She voids every couple hours.     She had COVID 6/6/22     She drinks just over 48 oz of water a day.     Never been a smoker.     Urine cultures:   7/18/2022 no growth "     7/7/2022 10,000-40,000 colony-forming units per milliliter of Klebsiella pneumoniae resistant to ampicillin, intermittent susceptibility to nitrofurantoin    4/26/2022 no growth      4/8/2022 Klebsiella aerogenes greater than 100,000 colony-forming units per milliliter resistant to cefazolin intermediate susceptibility to nitrofurantoin    1/10/2022 no growth    Objective     Past Medical History:   Diagnosis Date   • Anemia 06/2021    Iron prescription   • Arrhythmia    • Colon polyp    • Diabetes mellitus (HCC)    • GERD (gastroesophageal reflux disease)    • Hiatal hernia    • Hyperlipidemia    • Lung nodule 7/2022    On my ct scan at [facility]   • Rapid or irregular heartbeat    • Seasonal allergies    • Type 2 diabetes mellitus (HCC)        Past Surgical History:   Procedure Laterality Date   • CARDIAC CATHETERIZATION     • CATARACT EXTRACTION, BILATERAL     • COLONOSCOPY     • COLONOSCOPY N/A 06/25/2021    Procedure: COLONOSCOPY;  Surgeon: Ty Partida MD;  Location: formerly [facility] ENDOSCOPY;  Service: General;  Laterality: N/A;  Colon polyp   • ENDOSCOPY N/A 06/25/2021    Procedure: ESOPHAGOGASTRODUODENOSCOPY;  Surgeon: Ty Partida MD;  Location: formerly [facility] ENDOSCOPY;  Service: General;  Laterality: N/A;  Hiatal Hernia   • EYE CAPSULOTOMY WITH LASER     • HERNIA REPAIR     • HYSTERECTOMY     • LASIK     • TONSILLECTOMY           Current Outpatient Medications:   •  Accu-Chek Softclix Lancets lancets, 1 each by Other route 2 (Two) Times a Day. Use as instructed, Disp: 100 each, Rfl: 3  •  aspirin (aspirin) 81 MG EC tablet, Daily., Disp: , Rfl:   •  benzonatate (TESSALON) 100 MG capsule, Take 1 capsule by mouth 2 (Two) Times a Day As Needed for Cough., Disp: 30 capsule, Rfl: 0  •  Calcium Carbonate 1500 (600 Ca) MG tablet, 2 (Two) Times a Day With Meals., Disp: , Rfl:   •  cetirizine (zyrTEC) 10 MG tablet, Take 10 mg by mouth Daily As Needed., Disp: , Rfl:   •  Cholecalciferol (Vitamin D3) 50 MCG (2000  UT) tablet, Take 2,000 Units by mouth Daily., Disp: , Rfl:   •  estradiol (ESTRACE) 0.1 MG/GM vaginal cream, Insert 2 g into the vagina Daily., Disp: 42.5 g, Rfl: 3  •  ezetimibe (ZETIA) 10 MG tablet, TAKE 1 TABLET BY MOUTH DAILY, Disp: 90 tablet, Rfl: 1  •  FeroSul 325 (65 Fe) MG tablet, TAKE 1 TABLET BY MOUTH 2 TIMES DAILY (Patient taking differently: Take 325 mg by mouth Every Other Day.), Disp: 60 tablet, Rfl: 3  •  fluticasone (FLONASE) 50 MCG/ACT nasal spray, SHAKE LIQUID AND USE 2 SPRAYS IN EACH NOSTRIL EVERY DAY AS DIRECTED, Disp: 16 g, Rfl: 3  •  glucose blood (Accu-Chek Guide) test strip, 1 each by Other route 2 (Two) Times a Day., Disp: 100 each, Rfl: 3  •  Omega-3 1000 MG capsule, Take 1,000 mg by mouth Daily., Disp: , Rfl:   •  omeprazole (priLOSEC) 20 MG capsule, Take 1 capsule by mouth Daily., Disp: 90 capsule, Rfl: 1  •  rosuvastatin (CRESTOR) 10 MG tablet, TAKE 1 TABLET BY MOUTH EVERY NIGHT, Disp: 90 tablet, Rfl: 1  •  Tirzepatide (Mounjaro) 5 MG/0.5ML solution pen-injector, Inject 0.5 mL under the skin into the appropriate area as directed Every 7 (Seven) Days., Disp: 2 mL, Rfl: 1  •  valACYclovir (VALTREX) 500 MG tablet, Take 500 mg by mouth As Needed., Disp: , Rfl:   •  fluconazole (DIFLUCAN) 150 MG tablet, Take 1 tablet by mouth Daily., Disp: 3 tablet, Rfl: 0  •  Mirabegron ER (Myrbetriq) 25 MG tablet sustained-release 24 hour 24 hr tablet, Take 1 tablet by mouth Daily., Disp: 90 tablet, Rfl: 3  •  Vitamin D 12.5 MCG/0.25ML liquid, , Disp: , Rfl:     Current Facility-Administered Medications:   •  denosumab (PROLIA) syringe 60 mg, 60 mg, Subcutaneous, Q6 Months, Mata Love MD, 60 mg at 09/19/22 1509    No Known Allergies     Family History   Problem Relation Age of Onset   • Heart disease Mother    • Heart failure Mother         Congestive heart failure   • Heart disease Father    • Heart failure Father         3 heart attacks in 6 years   • Heart disease Sister    • Cancer Sister   "       Breast cancer   • Heart disease Brother    • Cancer Brother         Tongue cancer   • Heart failure Brother         Pace maker defibrillator and stents at 40   • Cancer Sister         Melanoma skin cancer   • Heart failure Brother         By pass   • Heart failure Sister         5 by pass   • Malig Hyperthermia Neg Hx        Social History     Socioeconomic History   • Marital status:    Tobacco Use   • Smoking status: Never   • Smokeless tobacco: Never   Vaping Use   • Vaping Use: Never used   Substance and Sexual Activity   • Alcohol use: Not Currently     Comment: Maybe couple times year   • Drug use: Never   • Sexual activity: Not Currently     Partners: Male     Birth control/protection: Condom, Pill, Diaphragm, Spermicide       Vital Signs:   Resp 16   Ht 160 cm (63\")   Wt 56.8 kg (125 lb 3.2 oz)   BMI 22.18 kg/m²      Physical Exam     Result Review :   The following data was reviewed by: JANES Zuñiga on 07/12/2021:  Results for orders placed or performed in visit on 01/23/23   Bladder Scan   Result Value Ref Range    Urine Volume 34    POC Urinalysis Dipstick, Automated    Specimen: Urine   Result Value Ref Range    Color Dark Yellow Yellow, Straw, Dark Yellow, Tammi    Clarity, UA Cloudy (A) Clear    Specific Gravity  1.025 1.005 - 1.030    pH, Urine 5.5 5.0 - 8.0    Leukocytes Moderate (2+) (A) Negative    Nitrite, UA Positive (A) Negative    Protein,  mg/dL (A) Negative mg/dL    Glucose, UA Negative Negative mg/dL    Ketones, UA 15 mg/dL (A) Negative    Urobilinogen, UA Normal Normal, 0.2 E.U./dL    Bilirubin Small (1+) (A) Negative    Blood, UA Moderate (A) Negative    Lot Number [id number]     Expiration Date 2,023/7        Bladder Scan interpretation 01/23/2023    Estimation of residual urine via BVI 3000 Verathon Bladder Scan  MA/nurse performing: Jesenia DONOVAN RN   Residual Urine: 34  ml  Indication: Postmenopausal atrophic vaginitis    Recurrent urinary tract " infection    Urgency incontinence    Cystitis cystica   Position: Supine  Examination: Incremental scanning of the suprapubic area using 2.0 MHz transducer using copious amounts of acoustic gel.   Findings: An anechoic area was demonstrated which represented the bladder, with measurement of residual urine as noted. I inspected this myself. In that the residual urine was stable or insignificant, refer to plan for treatment and plan necessary at this time.           Procedures        Assessment and Plan    Diagnoses and all orders for this visit:    1. Postmenopausal atrophic vaginitis (Primary)  -     fluconazole (DIFLUCAN) 150 MG tablet; Take 1 tablet by mouth Daily.  Dispense: 3 tablet; Refill: 0    2. Recurrent urinary tract infection  -     Bladder Scan  -     POC Urinalysis Dipstick, Automated  -     Urine Culture - Urine, Urine, Clean Catch; Future  -     Urine Culture - Urine, Urine, Clean Catch    3. Urgency incontinence  -     Mirabegron ER (Myrbetriq) 25 MG tablet sustained-release 24 hour 24 hr tablet; Take 1 tablet by mouth Daily.  Dispense: 90 tablet; Refill: 3    4. Cystitis cystica    We will try patient on Myrbetriq 25 mg daily send her urine for culture today and treat her for possible yeast infection.  We will call her with the results of her urine culture and follow-up with her in 6 weeks or sooner if needed.      I spent 20 minutes caring for Dacia on this date of service. This time includes time spent by me in the following activities:reviewing tests, obtaining and/or reviewing a separately obtained history, performing a medically appropriate examination and/or evaluation , counseling and educating the patient/family/caregiver, ordering medications, tests, or procedures, and documenting information in the medical record  Follow Up   Return in about 6 weeks (around 3/6/2023) for f/u uti/ myrbetriq .  Patient was given instructions and counseling regarding her condition or for health maintenance  advice. Please see specific information pulled into the AVS if appropriate.

## 2023-01-23 NOTE — TELEPHONE ENCOUNTER
From: Dacia Napier  To: Annabelle Buckley  Sent: 1/22/2023 11:09 AM EST  Subject: Mounjaro    I need to have my Mounjaro prescription filled. I am currently on 5 mg. Do I stay on that?

## 2023-01-24 RX ORDER — ESTRADIOL 0.1 MG/G
2 CREAM VAGINAL DAILY
Qty: 42.5 G | Refills: 3 | Status: SHIPPED | OUTPATIENT
Start: 2023-01-24

## 2023-01-24 NOTE — TELEPHONE ENCOUNTER
Rx Refill Note  Requested Prescriptions     Pending Prescriptions Disp Refills   • estradiol (ESTRACE) 0.1 MG/GM vaginal cream 42.5 g 3     Sig: Insert 2 g into the vagina Daily.      Last telemedicine visit with prescribing clinician: 1/13/23         Next office visit with prescribing clinician: 7/13/2023       Would you like a call back once the refill request has been completed: [] Yes [] No    If the office needs to give you a call back, can they leave a voicemail: [] Yes [] No    Dary Garcia LPN  01/24/23, 09:00 EST

## 2023-01-26 ENCOUNTER — TELEPHONE (OUTPATIENT)
Dept: FAMILY MEDICINE CLINIC | Age: 78
End: 2023-01-26
Payer: MEDICARE

## 2023-01-26 ENCOUNTER — HOSPITAL ENCOUNTER (OUTPATIENT)
Dept: CT IMAGING | Facility: HOSPITAL | Age: 78
Discharge: HOME | End: 2023-01-26
Admitting: FAMILY MEDICINE
Payer: MEDICARE

## 2023-01-26 DIAGNOSIS — M81.0 POSTMENOPAUSAL OSTEOPOROSIS: Primary | ICD-10-CM

## 2023-01-26 DIAGNOSIS — R91.1 LUNG NODULE: ICD-10-CM

## 2023-01-26 LAB
BACTERIA SPEC AEROBE CULT: ABNORMAL
BACTERIA SPEC AEROBE CULT: ABNORMAL

## 2023-01-26 PROCEDURE — 71250 CT THORAX DX C-: CPT

## 2023-01-26 RX ORDER — SULFAMETHOXAZOLE AND TRIMETHOPRIM 800; 160 MG/1; MG/1
1 TABLET ORAL 2 TIMES DAILY
Qty: 20 TABLET | Refills: 0 | Status: SHIPPED | OUTPATIENT
Start: 2023-01-26 | End: 2023-02-05

## 2023-01-26 NOTE — TELEPHONE ENCOUNTER
Pt due for Prolia 3/19/23    Last Dexa -1.9  DEXA Bone Density Axial (06/24/2022 09:47)    Last CMP- Calcium 9.5  Comprehensive Metabolic Panel (11/29/2022 11:07)    Last office Visit   Telemedicine with Mata Love MD (01/13/2023)    Ok to proceed with ordering Prolia?

## 2023-01-31 NOTE — TELEPHONE ENCOUNTER
Rx Refill Note  Requested Prescriptions     Pending Prescriptions Disp Refills   • Vitamin D 12.5 MCG/0.25ML liquid 12 mL       Last office visit with prescribing clinician: 9/1/2021       Next office visit with prescribing clinician: 7/13/2023     Would you like a call back once the refill request has been completed: [] Yes [] No    If the office needs to give you a call back, can they leave a voicemail: [] Yes [] No    Dary Garcia LPN  01/31/23, 10:07 EST     Denied,. Dr Love did not prescribe

## 2023-02-06 ENCOUNTER — OFFICE VISIT (OUTPATIENT)
Dept: DIABETES SERVICES | Facility: HOSPITAL | Age: 78
End: 2023-02-06
Payer: MEDICARE

## 2023-02-06 VITALS
HEIGHT: 62 IN | TEMPERATURE: 97.3 F | DIASTOLIC BLOOD PRESSURE: 80 MMHG | WEIGHT: 125 LBS | OXYGEN SATURATION: 98 % | HEART RATE: 95 BPM | BODY MASS INDEX: 23 KG/M2 | SYSTOLIC BLOOD PRESSURE: 130 MMHG

## 2023-02-06 DIAGNOSIS — E11.22 TYPE 2 DIABETES MELLITUS WITH STAGE 3A CHRONIC KIDNEY DISEASE, WITH LONG-TERM CURRENT USE OF INSULIN: ICD-10-CM

## 2023-02-06 DIAGNOSIS — E11.65 UNCONTROLLED TYPE 2 DIABETES MELLITUS WITH HYPERGLYCEMIA: Primary | ICD-10-CM

## 2023-02-06 DIAGNOSIS — Z79.4 TYPE 2 DIABETES MELLITUS WITH STAGE 3A CHRONIC KIDNEY DISEASE, WITH LONG-TERM CURRENT USE OF INSULIN: ICD-10-CM

## 2023-02-06 DIAGNOSIS — N18.31 TYPE 2 DIABETES MELLITUS WITH STAGE 3A CHRONIC KIDNEY DISEASE, WITH LONG-TERM CURRENT USE OF INSULIN: ICD-10-CM

## 2023-02-06 LAB
EXPIRATION DATE: ABNORMAL
GLUCOSE BLDC GLUCOMTR-MCNC: 122 MG/DL (ref 70–99)
HBA1C MFR BLD: 6.2 %
Lab: ABNORMAL

## 2023-02-06 PROCEDURE — 82962 GLUCOSE BLOOD TEST: CPT | Performed by: NURSE PRACTITIONER

## 2023-02-06 PROCEDURE — 99213 OFFICE O/P EST LOW 20 MIN: CPT | Performed by: NURSE PRACTITIONER

## 2023-02-06 PROCEDURE — G0463 HOSPITAL OUTPT CLINIC VISIT: HCPCS | Performed by: NURSE PRACTITIONER

## 2023-02-06 NOTE — PROGRESS NOTES
Chief Complaint  Diabetes (Follow up, med mgt, a1c eval )    Referred By: Mata Love,*    Subjective          Dacia Napier presents to [facility] for diabetes medication management    History of Present Illness    Visit type:  follow-up  Diabetes type:  Type 2  Current diabetes status/concerns/issues:  The patient was started on Mounjaro at her last appointment.  She initially had some GI intolerance so we have only adjusted the dose once.  She is having some constipation with the medication.  She has lost 14 pounds since her last appointment.  Other health concerns: She has had bronchitis that has required treatment with steroids recently  Current Diabetes symptoms:    Polyuria: No   Polydipsia: No   Polyphagia: No   Blurred vision: No   Excessive fatigue: No   Known Diabetes complications:  Neuro: None  Renal: Stage III renal disease  Eyes: None  Amputation/Wounds: None  GI: None  Cardiovascular: Hypercholesterolemia  ED: N/A              Other: None  Hypoglycemia:  None reported at this time  Hypoglycemia Symptoms:  No hypoglycemia at this time  Current diabetes treatment:  Mounjaro 5 mg once weekly  Blood glucose device:  Meter  Blood glucose monitoring frequency:  1  Blood glucose range/average: She brings her blood glucose meter log to the appointment today that shows a 14-day glucose average of 115  Diet:  She states that her appetite has decreased dramatically with the use of the Mounjaro.  She states that she no longer has cravings for sweets  Activity/Exercise:  None    Past Medical History:   Diagnosis Date   • Anemia 06/2021    Iron prescription   • Arrhythmia    • Bronchitis    • Colon polyp    • Diabetes mellitus (HCC)    • GERD (gastroesophageal reflux disease)    • Hiatal hernia    • Hyperlipidemia    • Lung nodule 7/2022    On my ct scan at [facility]   • Rapid or irregular heartbeat    • Seasonal allergies    • Type 2 diabetes mellitus (HCC)       Past Surgical History:   Procedure Laterality Date   • CARDIAC CATHETERIZATION     • CATARACT EXTRACTION, BILATERAL     • COLONOSCOPY     • COLONOSCOPY N/A 06/25/2021    Procedure: COLONOSCOPY;  Surgeon: Ty Partida MD;  Location: [facility] ENDOSCOPY;  Service: General;  Laterality: N/A;  Colon polyp   • ENDOSCOPY N/A 06/25/2021    Procedure: ESOPHAGOGASTRODUODENOSCOPY;  Surgeon: Ty Partida MD;  Location: [facility] ENDOSCOPY;  Service: General;  Laterality: N/A;  Hiatal Hernia   • EYE CAPSULOTOMY WITH LASER     • HERNIA REPAIR     • HYSTERECTOMY     • LASIK     • TONSILLECTOMY       Family History   Problem Relation Age of Onset   • Heart disease Mother    • Heart failure Mother         Congestive heart failure   • Heart disease Father    • Heart failure Father         3 heart attacks in 6 years   • Heart disease Sister    • Cancer Sister         Breast cancer   • Heart disease Brother    • Cancer Brother         Tongue cancer   • Heart failure Brother         Pace maker defibrillator and stents at 40   • Cancer Sister         Melanoma skin cancer   • Heart failure Brother         By pass   • Heart failure Sister         5 by pass   • Malig Hyperthermia Neg Hx      Social History     Socioeconomic History   • Marital status:    Tobacco Use   • Smoking status: Never   • Smokeless tobacco: Never   Vaping Use   • Vaping Use: Never used   Substance and Sexual Activity   • Alcohol use: Not Currently     Comment: Maybe couple times year   • Drug use: Never   • Sexual activity: Not Currently     Partners: Male     Birth control/protection: Condom, Pill, Diaphragm, Spermicide     No Known Allergies    Current Outpatient Medications:   •  Accu-Chek Softclix Lancets lancets, 1 each by Other route 2 (Two) Times a Day. Use as instructed, Disp: 100 each, Rfl: 3  •  aspirin (aspirin) 81 MG EC tablet, Daily., Disp: , Rfl:   •  benzonatate (TESSALON) 100 MG capsule, Take 1 capsule by mouth 2 (Two) Times a Day As  Needed for Cough., Disp: 30 capsule, Rfl: 0  •  Calcium Carbonate 1500 (600 Ca) MG tablet, 2 (Two) Times a Day With Meals., Disp: , Rfl:   •  cetirizine (zyrTEC) 10 MG tablet, Take 10 mg by mouth Daily As Needed., Disp: , Rfl:   •  Cholecalciferol (Vitamin D3) 50 MCG (2000 UT) tablet, Take 2,000 Units by mouth Daily., Disp: , Rfl:   •  estradiol (ESTRACE) 0.1 MG/GM vaginal cream, Insert 2 g into the vagina Daily., Disp: 42.5 g, Rfl: 3  •  ezetimibe (ZETIA) 10 MG tablet, TAKE 1 TABLET BY MOUTH DAILY, Disp: 90 tablet, Rfl: 1  •  FeroSul 325 (65 Fe) MG tablet, TAKE 1 TABLET BY MOUTH 2 TIMES DAILY (Patient taking differently: Take 325 mg by mouth Every Other Day.), Disp: 60 tablet, Rfl: 3  •  fluconazole (DIFLUCAN) 150 MG tablet, Take 1 tablet by mouth Daily., Disp: 3 tablet, Rfl: 0  •  fluticasone (FLONASE) 50 MCG/ACT nasal spray, SHAKE LIQUID AND USE 2 SPRAYS IN EACH NOSTRIL EVERY DAY AS DIRECTED, Disp: 16 g, Rfl: 3  •  glucose blood (Accu-Chek Guide) test strip, 1 each by Other route 2 (Two) Times a Day., Disp: 100 each, Rfl: 3  •  Mirabegron ER (Myrbetriq) 25 MG tablet sustained-release 24 hour 24 hr tablet, Take 1 tablet by mouth Daily., Disp: 90 tablet, Rfl: 3  •  Omega-3 1000 MG capsule, Take 1,000 mg by mouth Daily., Disp: , Rfl:   •  omeprazole (priLOSEC) 20 MG capsule, Take 1 capsule by mouth Daily., Disp: 90 capsule, Rfl: 1  •  rosuvastatin (CRESTOR) 10 MG tablet, TAKE 1 TABLET BY MOUTH EVERY NIGHT, Disp: 90 tablet, Rfl: 1  •  Tirzepatide (Mounjaro) 5 MG/0.5ML solution pen-injector, Inject 0.5 mL under the skin into the appropriate area as directed Every 7 (Seven) Days., Disp: 2 mL, Rfl: 1  •  valACYclovir (VALTREX) 500 MG tablet, Take 500 mg by mouth As Needed., Disp: , Rfl:   •  Vitamin D 12.5 MCG/0.25ML liquid, , Disp: , Rfl:     Current Facility-Administered Medications:   •  denosumab (PROLIA) syringe 60 mg, 60 mg, Subcutaneous, Q6 Months, Mata Love MD, 60 mg at 09/19/22 7908    Review of  "Systems   Constitutional: Positive for appetite change and unexpected weight loss (14 pounds). Negative for activity change, fatigue and unexpected weight gain.   Eyes: Negative for blurred vision and visual disturbance.   Gastrointestinal: Positive for constipation, GERD and indigestion. Negative for abdominal pain, diarrhea, nausea and vomiting.   Endocrine: Negative for polydipsia, polyphagia and polyuria.   Neurological: Negative for numbness.        Objective     Vitals:    02/06/23 1424   BP: 130/80   BP Location: Right arm   Patient Position: Sitting   Cuff Size: Pediatric   Pulse: 95   Temp: 97.3 °F (36.3 °C)   SpO2: 98%   Weight: 56.7 kg (125 lb)   Height: 157.5 cm (62\")   PainSc: 0-No pain     Body mass index is 22.86 kg/m².    Physical Exam  Constitutional:       Appearance: Normal appearance.   HENT:      Head: Normocephalic and atraumatic.      Right Ear: External ear normal.      Left Ear: External ear normal.      Nose: Nose normal.   Eyes:      Extraocular Movements: Extraocular movements intact.      Conjunctiva/sclera: Conjunctivae normal.   Pulmonary:      Effort: Pulmonary effort is normal.   Musculoskeletal:         General: Normal range of motion.      Cervical back: Normal range of motion.   Skin:     General: Skin is warm and dry.   Neurological:      General: No focal deficit present.      Mental Status: She is alert and oriented to person, place, and time. Mental status is at baseline.   Psychiatric:         Mood and Affect: Mood normal.         Behavior: Behavior normal.         Thought Content: Thought content normal.         Judgment: Judgment normal.         Result Review :   The following data was reviewed by: JANES Betancourt on 02/06/2023:    Most Recent A1C    HGBA1C Most Recent 2/6/23   Hemoglobin A1C 6.2 (A)   (A) Abnormal value              A1C Last 3 Results    HGBA1C Last 3 Results 8/24/22 10/31/22 2/6/23   Hemoglobin A1C 7.50 (A) 6.6 6.2 (A)   (A) Abnormal value     "        Point-of-care A1c in the office today is 6.2% indicating controlled type 2 diabetes.  This is down from the previous result of 6.6 collected in October 2022    Glucose   Date Value Ref Range Status   02/06/2023 122 (H) 70 - 99 mg/dL Final     Comment:     Serial Number: [id number]:  [id number]     Point-of-care glucose in the office today is within normal limits for nonfasting glucose    Creatinine   Date Value Ref Range Status   11/29/2022 1.00 0.57 - 1.00 mg/dL Final   08/24/2022 1.12 (H) 0.57 - 1.00 mg/dL Final     eGFR   Date Value Ref Range Status   11/29/2022 58.1 (L) >60.0 mL/min/1.73 Final     Comment:     National Kidney Foundation and American Society of Nephrology (ASN) Task Force recommended calculation based on the Chronic Kidney Disease Epidemiology Collaboration (CKD-EPI) equation refit without adjustment for race.   08/24/2022 51.1 (L) >60.0 mL/min/1.73 Final     Comment:     National Kidney Foundation and American Society of Nephrology (ASN) Task Force recommended calculation based on the Chronic Kidney Disease Epidemiology Collaboration (CKD-EPI) equation refit without adjustment for race.     Labs collected on 11/29/2022 show stage III renal disease            Assessment: Her A1c remains well controlled and has actually improved since last appointment.  She is having some difficulty with constipation and extreme loss of appetite with the Mounjaro.  She states she did not notice it worsening when she went from the 2.5 mg strength of the 5 mg strength.  She is concerned that she does not want to have excessive weight loss.  She is already lost 14 pounds since starting the medication.      Diagnoses and all orders for this visit:    1. Uncontrolled type 2 diabetes mellitus with hyperglycemia (HCC) (Primary)  -     POC Glycosylated Hemoglobin (Hb A1C)    2. Type 2 diabetes mellitus with stage 3a chronic kidney disease, with long-term current use of insulin (HCC)    Other orders  -     POC  Glucose        Plan: At this time we will make no changes to her treatment plan.  If she continues to have ongoing weight loss or persistent problems with excessive constipation we may consider decreasing the Mounjaro back down to the 2.5 mg strength if necessary.  She will contact the office if she feels this is needed.    The patient will monitor her blood glucose levels once a day.  If she develops problematic hyperglycemia or hypoglycemia or adverse drug reactions, she will contact the office for further instructions.        Follow Up     No follow-ups on file.    Patient was given instructions and counseling regarding her condition or for health maintenance advice. Please see specific information pulled into the AVS if appropriate.     Annabelle Buckley, APRN  02/06/2023      Dictated Utilizing Dragon Dictation.  Please note that portions of this note were completed with a voice recognition program.  Part of this note may be an electronic transcription/translation of spoken language to printed text using the Dragon Dictation System.

## 2023-02-26 DIAGNOSIS — E11.9 TYPE 2 DIABETES MELLITUS WITHOUT COMPLICATION, WITHOUT LONG-TERM CURRENT USE OF INSULIN: ICD-10-CM

## 2023-02-27 DIAGNOSIS — E11.9 TYPE 2 DIABETES MELLITUS WITHOUT COMPLICATION, WITHOUT LONG-TERM CURRENT USE OF INSULIN: ICD-10-CM

## 2023-02-27 RX ORDER — LANCETS
EACH MISCELLANEOUS
Qty: 100 EACH | Refills: 3 | Status: SHIPPED | OUTPATIENT
Start: 2023-02-27

## 2023-02-27 RX ORDER — TIRZEPATIDE 5 MG/.5ML
INJECTION, SOLUTION SUBCUTANEOUS
Qty: 2 ML | Refills: 1 | Status: SHIPPED | OUTPATIENT
Start: 2023-02-27

## 2023-03-10 NOTE — TELEPHONE ENCOUNTER
[facility] - This medication or product is on your plan's list of covered drugs. Prior authorization is not required at this time.    Sent rx to [facility] pharmacy.

## 2023-03-13 ENCOUNTER — TELEPHONE (OUTPATIENT)
Dept: PULMONOLOGY | Facility: CLINIC | Age: 78
End: 2023-03-13
Payer: MEDICARE

## 2023-03-13 DIAGNOSIS — J30.89 OTHER ALLERGIC RHINITIS: ICD-10-CM

## 2023-03-13 RX ORDER — AZITHROMYCIN 250 MG/1
TABLET, FILM COATED ORAL
Qty: 6 TABLET | Refills: 0 | Status: SHIPPED | OUTPATIENT
Start: 2023-03-13 | End: 2023-04-03

## 2023-03-13 RX ORDER — PREDNISONE 20 MG/1
40 TABLET ORAL DAILY
Qty: 14 TABLET | Refills: 0 | Status: SHIPPED | OUTPATIENT
Start: 2023-03-13 | End: 2023-04-03

## 2023-03-13 RX ORDER — FLUTICASONE PROPIONATE 50 MCG
SPRAY, SUSPENSION (ML) NASAL
Qty: 16 G | Refills: 3 | Status: SHIPPED | OUTPATIENT
Start: 2023-03-13

## 2023-03-13 RX ORDER — BENZONATATE 100 MG/1
100 CAPSULE ORAL 2 TIMES DAILY PRN
Qty: 30 CAPSULE | Refills: 1 | Status: SHIPPED | OUTPATIENT
Start: 2023-03-13

## 2023-03-13 NOTE — TELEPHONE ENCOUNTER
Pt called stated she tested positive this past Sunday (yesterday) for Covid. Pt requested to send an antibiotic, and or prednisone, or paxlovid to her pharmacy.

## 2023-03-14 RX ORDER — FLUCONAZOLE 150 MG/1
150 TABLET ORAL ONCE
Qty: 1 TABLET | Refills: 0 | Status: SHIPPED | OUTPATIENT
Start: 2023-03-14 | End: 2023-03-14

## 2023-03-31 ENCOUNTER — CLINICAL SUPPORT (OUTPATIENT)
Dept: FAMILY MEDICINE CLINIC | Age: 78
End: 2023-03-31
Payer: MEDICARE

## 2023-03-31 DIAGNOSIS — M81.0 POSTMENOPAUSAL OSTEOPOROSIS: Primary | ICD-10-CM

## 2023-04-03 ENCOUNTER — OFFICE VISIT (OUTPATIENT)
Dept: PULMONOLOGY | Facility: CLINIC | Age: 78
End: 2023-04-03
Payer: MEDICARE

## 2023-04-03 VITALS
DIASTOLIC BLOOD PRESSURE: 74 MMHG | HEART RATE: 73 BPM | OXYGEN SATURATION: 96 % | SYSTOLIC BLOOD PRESSURE: 117 MMHG | HEIGHT: 62 IN | WEIGHT: 119.8 LBS | TEMPERATURE: 97.9 F | RESPIRATION RATE: 16 BRPM | BODY MASS INDEX: 22.05 KG/M2

## 2023-04-03 DIAGNOSIS — R91.1 LUNG NODULE: ICD-10-CM

## 2023-04-03 DIAGNOSIS — U09.9 LONG COVID: Primary | ICD-10-CM

## 2023-04-03 PROCEDURE — 1159F MED LIST DOCD IN RCRD: CPT | Performed by: INTERNAL MEDICINE

## 2023-04-03 PROCEDURE — 1160F RVW MEDS BY RX/DR IN RCRD: CPT | Performed by: INTERNAL MEDICINE

## 2023-04-03 PROCEDURE — 99213 OFFICE O/P EST LOW 20 MIN: CPT | Performed by: INTERNAL MEDICINE

## 2023-04-03 NOTE — PROGRESS NOTES
Pulmonary Office Follow-up    Subjective     Dacia Napier is seen today at the office for   Chief Complaint   Patient presents with   • Bronchitis   • Follow-up     3 month fu         HPI  Dacia Napier is a 77 y.o. female with a PMH significant for COVID infection earlier this year presents for follow-up patient denies any cough shortness of breath or wheezing she has been doing really well and nurse taste is back      Tobacco use history:  Never smoker      Patient Active Problem List   Diagnosis   • Postmenopausal atrophic vaginitis   • Agenesis of right kidney   • Type 2 diabetes mellitus without complication   • Hiatal hernia with GERD without esophagitis   • High cholesterol   • Postmenopausal osteoporosis   • Recurrent urinary tract infection   • Other allergic rhinitis   • Palpitations (sees Cardiology--negative w/u)    • Heart murmur (neg ECHO in 2021)    • Stage 3b chronic kidney disease   • Urgency incontinence   • Recurrent cold sores       Review of Systems  Review of Systems   All other systems reviewed and are negative.    As described in the HPI. Otherwise, remainder of ROS (14 systems) were negative.    Medications, Allergies, Social, and Family Histories reviewed as per EMR.    Objective     Vitals:    04/03/23 1438   BP: 117/74   Pulse: 73   Resp: 16   Temp: 97.9 °F (36.6 °C)   SpO2: 96%         04/03/23  1438   Weight: 54.3 kg (119 lb 12.8 oz)       Physical Exam  Vitals and nursing note reviewed.   Constitutional:       Appearance: Normal appearance.   HENT:      Head: Normocephalic and atraumatic.      Nose: Nose normal.      Mouth/Throat:      Mouth: Mucous membranes are moist.      Pharynx: Oropharynx is clear.   Eyes:      Extraocular Movements: Extraocular movements intact.      Conjunctiva/sclera: Conjunctivae normal.      Pupils: Pupils are equal, round, and reactive to light.   Cardiovascular:      Rate and Rhythm: Normal rate and regular rhythm.      Pulses: Normal pulses.       Heart sounds: Normal heart sounds.   Pulmonary:      Effort: Pulmonary effort is normal.      Breath sounds: Normal breath sounds.   Abdominal:      General: Abdomen is flat. Bowel sounds are normal.      Palpations: Abdomen is soft.   Musculoskeletal:         General: Normal range of motion.      Cervical back: Normal range of motion and neck supple.   Skin:     General: Skin is warm.      Capillary Refill: Capillary refill takes 2 to 3 seconds.   Neurological:      General: No focal deficit present.      Mental Status: She is alert and oriented to person, place, and time.   Psychiatric:         Mood and Affect: Mood normal.         Behavior: Behavior normal.         CT Chest Without Contrast Diagnostic    Result Date: 1/26/2023    1. Stable 6 mm right lower lobe pulmonary nodule.  Recommend 1 year follow-up.     ANNE MARIE MOREIRA MD       Electronically Signed and Approved By: ANNE MARIE MOREIRA MD on 1/26/2023 at 10:46                Assessment & Plan     Diagnoses and all orders for this visit:    1. Long COVID (Primary)    2. Lung nodule         Discussion/ Recommendations:   Patient is reassured  CT scan reviewed  We will repeat CT scan in 1 year for stability of lung nodule  Patient advised regular exercise  Vaccinations discussed and recommended    BMI is within normal parameters. No other follow-up for BMI required.        Return in about 6 months (around 10/3/2023).          This document has been electronically signed by Johan De Jesus MD on April 3, 2023 14:48 EDT

## 2023-04-10 DIAGNOSIS — K21.9 HIATAL HERNIA WITH GERD WITHOUT ESOPHAGITIS: ICD-10-CM

## 2023-04-10 DIAGNOSIS — K44.9 HIATAL HERNIA WITH GERD WITHOUT ESOPHAGITIS: ICD-10-CM

## 2023-04-10 RX ORDER — OMEPRAZOLE 20 MG/1
20 CAPSULE, DELAYED RELEASE ORAL DAILY
Qty: 90 CAPSULE | Refills: 0 | Status: SHIPPED | OUTPATIENT
Start: 2023-04-10

## 2023-04-10 NOTE — TELEPHONE ENCOUNTER
Rx Refill Note  Requested Prescriptions     Pending Prescriptions Disp Refills   • omeprazole (priLOSEC) 20 MG capsule 90 capsule 1     Sig: Take 1 capsule by mouth Daily.      Last office visit with prescribing  1/13/23    Next office visit with prescribing clinician: 7/13/2023     Dary Garcia LPN  04/10/23, 15:13 EDT

## 2023-04-14 DIAGNOSIS — E78.00 HIGH CHOLESTEROL: ICD-10-CM

## 2023-04-14 DIAGNOSIS — E11.9 TYPE 2 DIABETES MELLITUS WITHOUT COMPLICATION, WITHOUT LONG-TERM CURRENT USE OF INSULIN: ICD-10-CM

## 2023-04-14 RX ORDER — ROSUVASTATIN CALCIUM 10 MG/1
10 TABLET, COATED ORAL NIGHTLY
Qty: 90 TABLET | Refills: 1 | Status: SHIPPED | OUTPATIENT
Start: 2023-04-14

## 2023-04-14 RX ORDER — EZETIMIBE 10 MG/1
10 TABLET ORAL DAILY
Qty: 90 TABLET | Refills: 1 | Status: SHIPPED | OUTPATIENT
Start: 2023-04-14

## 2023-04-17 RX ORDER — ESTRADIOL 0.1 MG/G
CREAM VAGINAL
Qty: 42.5 G | Refills: 3 | Status: SHIPPED | OUTPATIENT
Start: 2023-04-17

## 2023-04-17 NOTE — TELEPHONE ENCOUNTER
Rx Refill Note  Requested Prescriptions     Pending Prescriptions Disp Refills   • estradiol (ESTRACE) 0.1 MG/GM vaginal cream [Pharmacy Med Name: ESTRADIOL 0.01% VAG CREAM 42.5GM] 42.5 g 3     Sig: INSERT 2 GRAMS VAGINALLY DAILY      Last office visit with prescribing clinician: 7/13/23      Next office visit with prescribing clinician: 7/13/23    Last filled 3/29/23 21day supply     Last mammo 1/28/22    Mammo Screening Digital Tomosynthesis Bilateral With CAD (06/24/2022 09:47)    Last dexa 2/16/22  LDEXA Bone Density Axial (06/24/2022 09:47)    Dary Garcia LPN  04/17/23, 08:59 EDT

## 2023-04-21 RX ORDER — TIRZEPATIDE 5 MG/.5ML
INJECTION, SOLUTION SUBCUTANEOUS
Qty: 2 ML | Refills: 1 | Status: SHIPPED | OUTPATIENT
Start: 2023-04-21

## 2023-05-12 ENCOUNTER — LAB (OUTPATIENT)
Dept: LAB | Facility: HOSPITAL | Age: 78
End: 2023-05-12
Payer: MEDICARE

## 2023-05-12 DIAGNOSIS — R30.0 DYSURIA: Primary | ICD-10-CM

## 2023-05-12 DIAGNOSIS — R30.0 DYSURIA: ICD-10-CM

## 2023-05-12 LAB
BACTERIA UR QL AUTO: ABNORMAL /HPF
BILIRUB UR QL STRIP: NEGATIVE
CLARITY UR: ABNORMAL
COLOR UR: ABNORMAL
GLUCOSE UR STRIP-MCNC: NEGATIVE MG/DL
HGB UR QL STRIP.AUTO: ABNORMAL
KETONES UR QL STRIP: ABNORMAL
LEUKOCYTE ESTERASE UR QL STRIP.AUTO: ABNORMAL
MUCOUS THREADS URNS QL MICRO: ABNORMAL /HPF
NITRITE UR QL STRIP: NEGATIVE
PH UR STRIP.AUTO: 5.5 [PH] (ref 5–8)
PROT UR QL STRIP: ABNORMAL
RBC # UR STRIP: ABNORMAL /HPF
REF LAB TEST METHOD: ABNORMAL
SP GR UR STRIP: 1.02 (ref 1–1.03)
SQUAMOUS #/AREA URNS HPF: ABNORMAL /HPF
UROBILINOGEN UR QL STRIP: ABNORMAL
WBC # UR STRIP: ABNORMAL /HPF

## 2023-05-12 PROCEDURE — 87086 URINE CULTURE/COLONY COUNT: CPT

## 2023-05-12 PROCEDURE — 87186 SC STD MICRODIL/AGAR DIL: CPT

## 2023-05-12 PROCEDURE — 81001 URINALYSIS AUTO W/SCOPE: CPT

## 2023-05-12 PROCEDURE — 87077 CULTURE AEROBIC IDENTIFY: CPT

## 2023-05-14 ENCOUNTER — DOCUMENTATION (OUTPATIENT)
Dept: UROLOGY | Facility: CLINIC | Age: 78
End: 2023-05-14
Payer: MEDICARE

## 2023-05-14 DIAGNOSIS — R30.0 DYSURIA: Primary | ICD-10-CM

## 2023-05-14 LAB — BACTERIA SPEC AEROBE CULT: ABNORMAL

## 2023-05-14 RX ORDER — SULFAMETHOXAZOLE AND TRIMETHOPRIM 800; 160 MG/1; MG/1
1 TABLET ORAL 2 TIMES DAILY
Qty: 20 TABLET | Refills: 0 | Status: SHIPPED | OUTPATIENT
Start: 2023-05-14 | End: 2023-05-24

## 2023-05-15 ENCOUNTER — OFFICE VISIT (OUTPATIENT)
Dept: UROLOGY | Facility: CLINIC | Age: 78
End: 2023-05-15
Payer: MEDICARE

## 2023-05-15 VITALS — RESPIRATION RATE: 12 BRPM | WEIGHT: 115.2 LBS | BODY MASS INDEX: 21.2 KG/M2 | HEIGHT: 62 IN

## 2023-05-15 DIAGNOSIS — N39.41 URGENCY INCONTINENCE: Primary | ICD-10-CM

## 2023-05-15 PROBLEM — N18.31 STAGE 3A CHRONIC KIDNEY DISEASE: Status: ACTIVE | Noted: 2022-02-28

## 2023-05-15 RX ORDER — COVID-19 MOLECULAR TEST ASSAY
KIT MISCELLANEOUS SEE ADMIN INSTRUCTIONS
COMMUNITY
Start: 2023-04-17

## 2023-05-15 RX ORDER — SULFAMETHOXAZOLE AND TRIMETHOPRIM 800; 160 MG/1; MG/1
TABLET ORAL
Qty: 6 TABLET | Refills: 0 | Status: SHIPPED | OUTPATIENT
Start: 2023-05-15

## 2023-05-15 NOTE — PROGRESS NOTES
Chief Complaint: Urinary Incontinence (Pt here for follow up. Pt not taking Myrbetriq.  Pt taking Pyrdium.)    Subjective         History of Present Illness  Dacia Napier is a 77 y.o. female presents to [facility] to be seen for f/u recurrent UTI.    Patient just last week began experiencing symptoms of UTI had a urine culture performed which returned positive yesterday and medication was sent in for treatment.  Her last UTI was in January.    Last visit we started patient on Myrbetriq 25 mg daily to see if this would help to alleviate some of her urinary urgency frequency and incontinence.    She did not take this as she was informed that she had CKD and she wanted to discuss this before taking this.     Her last CMP was in 11/2022 and her GFR was 58.6  Creatinine was 1.0.    She is with continued dysuria but has nit started treatment with antibiotics yet.     She is still using estrace.     Previous:    Patient has had normal urine culture that was positive since we saw her last..     Patient was started on Gemtesa in August 2022.  The medication was working well for the patient however she could not afford the monthly cost of this medicine. she had a cystoscopy with Dr. Paredes on 9/26/2022 which revealed only cystitis cystica she was recommended to continue to drink plenty of fluids and to continue Gemtesa 75 mg daily with a 3-month follow-up.    Patient reached out via [employer] on October 30, 2022 and stated that Gemtesa was making the patient to have bladder spasms and constantly having to urinate and having discomfort when she went.  She called the office on 11/3/2022 and had a urine culture ordered and performed.  She was found to have a urinary tract infection Klebsiella pneumoniae and was prescribed levofloxacin and Florajen.    She called again on 12/14/2022 and requested urine culture and urinalysis for potential UTI however urine culture was negative.    She states she feels  "as if she has a \"yeast infection\"    She states that she stopped the gemtesa due to adverse effects of feeling like it dried her out.     She is still using estrace. She is seeing gynecology today.     She states she is with burning and pain with urination as well as urinary frequency and back aches and pressure in lower abdomen.     She is using vaginal estrace just started using again a week ago.    She still continues to have pain and symptoms when she is with negative CX.     She is with new onset urge incontinence and with frequency.     She voids every couple hours.     She had COVID 6/6/22     She drinks just over 48 oz of water a day.     Never been a smoker.     Urine cultures:   7/18/2022 no growth     7/7/2022 10,000-40,000 colony-forming units per milliliter of Klebsiella pneumoniae resistant to ampicillin, intermittent susceptibility to nitrofurantoin    4/26/2022 no growth      4/8/2022 Klebsiella aerogenes greater than 100,000 colony-forming units per milliliter resistant to cefazolin intermediate susceptibility to nitrofurantoin    1/10/2022 no growth    Objective     Past Medical History:   Diagnosis Date   • Anemia 06/2021    Iron prescription   • Arrhythmia    • Bronchitis    • Colon polyp    • Diabetes mellitus    • GERD (gastroesophageal reflux disease)    • Hiatal hernia    • Hyperlipidemia    • Lung nodule 7/2022    On my ct scan at [facility]   • Rapid or irregular heartbeat    • Seasonal allergies    • Type 2 diabetes mellitus        Past Surgical History:   Procedure Laterality Date   • CARDIAC CATHETERIZATION     • CATARACT EXTRACTION, BILATERAL     • COLONOSCOPY     • COLONOSCOPY N/A 06/25/2021    Procedure: COLONOSCOPY;  Surgeon: Ty Partida MD;  Location: [facility] ENDOSCOPY;  Service: General;  Laterality: N/A;  Colon polyp   • ENDOSCOPY N/A 06/25/2021    Procedure: ESOPHAGOGASTRODUODENOSCOPY;  Surgeon: Ty Partida MD;  Location: [facility] ENDOSCOPY;  Service: General;  " Laterality: N/A;  Hiatal Hernia   • EYE CAPSULOTOMY WITH LASER     • HERNIA REPAIR     • HYSTERECTOMY     • LASIK     • TONSILLECTOMY           Current Outpatient Medications:   •  Accu-Chek Softclix Lancets lancets, USE 1 LANCET TWICE DAILY AS DIRECTED, Disp: 100 each, Rfl: 3  •  aspirin (aspirin) 81 MG EC tablet, Daily., Disp: , Rfl:   •  benzonatate (TESSALON) 100 MG capsule, Take 1 capsule by mouth 2 (Two) Times a Day As Needed for Cough., Disp: 30 capsule, Rfl: 1  •  BinaxNOW COVID-19 Ag Home Test kit, See Admin Instructions. follow package directions, Disp: , Rfl:   •  Calcium Carbonate 1500 (600 Ca) MG tablet, 2 (Two) Times a Day With Meals., Disp: , Rfl:   •  cetirizine (zyrTEC) 10 MG tablet, Take 1 tablet by mouth Daily As Needed., Disp: , Rfl:   •  Cholecalciferol (Vitamin D3) 50 MCG (2000 UT) tablet, Take 1 tablet by mouth Daily., Disp: , Rfl:   •  denosumab (PROLIA) 60 MG/ML solution prefilled syringe syringe, Inject 1 mL under the skin into the appropriate area as directed Every 6 (Six) Months. Indications: Postmenopausal Osteoporosis, Disp: 1 mL, Rfl: 0  •  estradiol (ESTRACE) 0.1 MG/GM vaginal cream, INSERT 2 GRAMS VAGINALLY DAILY, Disp: 42.5 g, Rfl: 3  •  ezetimibe (ZETIA) 10 MG tablet, TAKE 1 TABLET BY MOUTH DAILY, Disp: 90 tablet, Rfl: 1  •  FeroSul 325 (65 Fe) MG tablet, TAKE 1 TABLET BY MOUTH 2 TIMES DAILY (Patient taking differently: Take 1 tablet by mouth Every Other Day.), Disp: 60 tablet, Rfl: 3  •  fluticasone (FLONASE) 50 MCG/ACT nasal spray, SHAKE LIQUID AND USE 2 SPRAYS IN EACH NOSTRIL EVERY DAY AS DIRECTED, Disp: 16 g, Rfl: 3  •  glucose blood (Accu-Chek Guide) test strip, For E11.9, Disp: 200 each, Rfl: 2  •  Mounjaro 5 MG/0.5ML solution pen-injector, ADMINISTER 5 MG UNDER THE SKIN EVERY 7 DAYS AS DIRECTED, Disp: 2 mL, Rfl: 1  •  Omega-3 1000 MG capsule, Take 1 capsule by mouth Daily., Disp: , Rfl:   •  omeprazole (priLOSEC) 20 MG capsule, Take 1 capsule by mouth Daily., Disp: 90  "capsule, Rfl: 0  •  rosuvastatin (CRESTOR) 10 MG tablet, TAKE 1 TABLET BY MOUTH EVERY NIGHT, Disp: 90 tablet, Rfl: 1  •  sulfamethoxazole-trimethoprim (Bactrim DS) 800-160 MG per tablet, Take 1 tablet by mouth 2 (Two) Times a Day for 10 days., Disp: 20 tablet, Rfl: 0  •  valACYclovir (VALTREX) 500 MG tablet, Take 1 tablet by mouth As Needed., Disp: , Rfl:   •  Vitamin D 12.5 MCG/0.25ML liquid, , Disp: , Rfl:   •  sulfamethoxazole-trimethoprim (Bactrim DS) 800-160 MG per tablet, Take on tablet twice a day starting the day before urodynamic test until gone, Disp: 6 tablet, Rfl: 0    No Known Allergies     Family History   Problem Relation Age of Onset   • Heart disease Mother    • Heart failure Mother         Congestive heart failure   • Heart disease Father    • Heart failure Father         3 heart attacks in 6 years   • Heart disease Sister    • Cancer Sister         Breast cancer   • Heart disease Brother    • Cancer Brother         Tongue cancer   • Heart failure Brother         Pace maker defibrillator and stents at 40   • Cancer Sister         Melanoma skin cancer   • Heart failure Brother         By pass   • Heart failure Sister         5 by pass   • Malig Hyperthermia Neg Hx        Social History     Socioeconomic History   • Marital status:    Tobacco Use   • Smoking status: Never     Passive exposure: Past   • Smokeless tobacco: Never   Vaping Use   • Vaping Use: Never used   Substance and Sexual Activity   • Alcohol use: Not Currently     Comment: Maybe couple times year   • Drug use: Never   • Sexual activity: Not Currently     Partners: Male     Birth control/protection: Condom, Pill, Diaphragm, Spermicide       Vital Signs:   Resp 12   Ht 157.5 cm (62\")   Wt 52.3 kg (115 lb 3.2 oz)   BMI 21.07 kg/m²      Physical Exam     Result Review :   The following data was reviewed by: JANES Zuñiga on 05/15/2023:  Results for orders placed or performed in visit on 05/12/23   Urine Culture - " Urine, Urine, Clean Catch    Specimen: Urine, Clean Catch   Result Value Ref Range    Urine Culture >100,000 CFU/mL Escherichia coli ESBL (A)        Susceptibility    Escherichia coli ESBL - MOIRA     Ertapenem  Susceptible ug/ml     Gentamicin  Susceptible ug/ml     Levofloxacin  Intermediate ug/ml     Meropenem  Susceptible ug/ml     Nitrofurantoin  Susceptible ug/ml     Piperacillin + Tazobactam  Susceptible ug/ml     Trimethoprim + Sulfamethoxazole  Susceptible ug/ml   Urinalysis without microscopic (no culture) - Urine, Clean Catch    Specimen: Urine, Clean Catch   Result Value Ref Range    Color, UA Dark Yellow (A) Yellow, Straw    Appearance, UA Cloudy (A) Clear    pH, UA 5.5 5.0 - 8.0    Specific Gravity, UA 1.025 1.005 - 1.030    Glucose, UA Negative Negative    Ketones, UA Trace (A) Negative    Bilirubin, UA Negative Negative    Blood, UA Trace (A) Negative    Protein, UA 30 mg/dL (1+) (A) Negative    Leuk Esterase, UA Small (1+) (A) Negative    Nitrite, UA Negative Negative    Urobilinogen, UA 0.2 E.U./dL 0.2 - 1.0 E.U./dL   Urinalysis, Microscopic Only - Urine, Clean Catch    Specimen: Urine, Clean Catch   Result Value Ref Range    RBC, UA 0-2 (A) None Seen /HPF    WBC, UA 31-50 (A) None Seen /HPF    Bacteria, UA 4+ (A) None Seen /HPF    Squamous Epithelial Cells, UA 3-6 (A) None Seen, 0-2 /HPF    Mucus, UA Trace None Seen, Trace /HPF    Methodology Manual Light Microscopy                  Procedures        Assessment and Plan    Diagnoses and all orders for this visit:    1. Urgency incontinence (Primary)  -     Cancel: POC Urinalysis Dipstick, Automated  -     Cystometrogram; Future  -     sulfamethoxazole-trimethoprim (Bactrim DS) 800-160 MG per tablet; Take on tablet twice a day starting the day before urodynamic test until gone  Dispense: 6 tablet; Refill: 0      We discussed today that I feel as if the myrbetriq is a safe option for tx at this time even with renal fxn mildly decreased.     We will  also order uds and f/u after.    I spent 20 minutes caring for Dacia on this date of service. This time includes time spent by me in the following activities:reviewing tests, obtaining and/or reviewing a separately obtained history, performing a medically appropriate examination and/or evaluation , counseling and educating the patient/family/caregiver, ordering medications, tests, or procedures, and documenting information in the medical record  Follow Up   Return for after uds .  Patient was given instructions and counseling regarding her condition or for health maintenance advice. Please see specific information pulled into the AVS if appropriate.

## 2023-05-19 DIAGNOSIS — B37.9 CANDIDIASIS: Primary | ICD-10-CM

## 2023-05-19 RX ORDER — FLUCONAZOLE 150 MG/1
150 TABLET ORAL DAILY
Qty: 3 TABLET | Refills: 0 | Status: SHIPPED | OUTPATIENT
Start: 2023-05-19

## 2023-05-23 ENCOUNTER — TELEPHONE (OUTPATIENT)
Dept: FAMILY MEDICINE CLINIC | Age: 78
End: 2023-05-23

## 2023-05-23 DIAGNOSIS — M81.0 POSTMENOPAUSAL OSTEOPOROSIS: ICD-10-CM

## 2023-05-23 DIAGNOSIS — Z12.31 SCREENING MAMMOGRAM FOR BREAST CANCER: Primary | ICD-10-CM

## 2023-05-23 NOTE — TELEPHONE ENCOUNTER
Mata Love MD  You 6 minutes ago (3:43 PM)     WH  She needs it every two years, but OK to order one if she would rather go ahead and get another one now.

## 2023-05-23 NOTE — TELEPHONE ENCOUNTER
Since her dexa scan was just last year does she need another one or is it recomended every 2 years?

## 2023-05-23 NOTE — TELEPHONE ENCOUNTER
Caller: Dacia Napier    Relationship: Self    Best call back number: [phone]    What orders are you requesting (i.e. lab or imaging): MAMMOGRAM, AND BONE ALFRED TESTING.     In what timeframe would the patient need to come in: BY June     Where will you receive your lab/imaging services: [facility]     Additional notes: PATIENT IS CALLING REQUESTING FOR THE ABOVE ORDERS, AND WOULD LIKE BY June, PLEASE CONTACT PATIENT ONCE ORDERS ARE PLACED.

## 2023-05-23 NOTE — TELEPHONE ENCOUNTER
Requesting mammogram and dexa scan orders.  Both done 06/24/23.   DEXA Bone Density Axial (06/24/2022 09:47)  Mammo Screening Digital Tomosynthesis Bilateral With CAD (06/24/2022 09:47)

## 2023-05-23 NOTE — TELEPHONE ENCOUNTER
Left a message stating dexa scans are usually done every 2 years but if you would like for Dr Love to go ahead and order one with your mammogram, call us back and let us know.

## 2023-06-06 ENCOUNTER — PATIENT MESSAGE (OUTPATIENT)
Dept: UROLOGY | Facility: CLINIC | Age: 78
End: 2023-06-06
Payer: MEDICARE

## 2023-06-06 DIAGNOSIS — E11.65 UNCONTROLLED TYPE 2 DIABETES MELLITUS WITH HYPERGLYCEMIA: Primary | ICD-10-CM

## 2023-06-14 DIAGNOSIS — N39.41 URGENCY INCONTINENCE: Primary | ICD-10-CM

## 2023-07-07 ENCOUNTER — NUTRITION (OUTPATIENT)
Dept: DIABETES SERVICES | Facility: HOSPITAL | Age: 78
End: 2023-07-07
Payer: MEDICARE

## 2023-07-07 DIAGNOSIS — E11.65 UNCONTROLLED TYPE 2 DIABETES MELLITUS WITH HYPERGLYCEMIA: Primary | ICD-10-CM

## 2023-07-07 PROCEDURE — 97802 MEDICAL NUTRITION INDIV IN: CPT | Performed by: DIETITIAN, REGISTERED

## 2023-07-13 PROBLEM — E61.1 LOW SERUM IRON: Status: ACTIVE | Noted: 2023-07-13

## 2023-07-18 ENCOUNTER — PATIENT MESSAGE (OUTPATIENT)
Dept: FAMILY MEDICINE CLINIC | Age: 78
End: 2023-07-18
Payer: MEDICARE

## 2023-07-20 ENCOUNTER — TELEPHONE (OUTPATIENT)
Dept: FAMILY MEDICINE CLINIC | Age: 78
End: 2023-07-20
Payer: MEDICARE

## 2023-07-20 NOTE — TELEPHONE ENCOUNTER
Caller: Dacia Napier    Relationship: Self    Best call back number:  [phone]    What orders are you requesting (i.e. lab or imaging): IMAGING MAMMOGRAM     In what timeframe would the patient need to come in: AS SOON AS POSSIBLE     Where will you receive your lab/imaging services: [facility]    Additional notes: PATIENT WAS ADVISED BASED ON THE RESULTS OF MAMMOGRAM SHE SHOULD HAVE THE LEFT BREAST REDONE

## 2023-07-20 NOTE — TELEPHONE ENCOUNTER
Caller: Dacia Napier    Relationship: Self    Best call back number: [phone]    What form or medical record are you requesting: LIST OF THE MDS IN THIS OFFICE     How would you like to receive the form or medical records ( mail, )  If mail, what is the address:   [address] [address]       Timeframe paperwork needed: NONE    Additional notes: PATIENT JUST WANTS THE DOCTORS AND NPT APRNS

## 2023-07-26 NOTE — TELEPHONE ENCOUNTER
Provider: DR. RAMOS  Caller: JOSE  Relationship to Patient: SELF  Phone Number: [phone]  Reason for Call: THE PATIENT STATED SHE HAS NOT RECEIVED LIST AND WOULD LIKE THIS MAILED ASAP

## 2023-07-27 ENCOUNTER — TELEPHONE (OUTPATIENT)
Dept: FAMILY MEDICINE CLINIC | Age: 78
End: 2023-07-27
Payer: MEDICARE

## 2023-07-27 ENCOUNTER — PROCEDURE VISIT (OUTPATIENT)
Dept: UROLOGY | Facility: CLINIC | Age: 78
End: 2023-07-27
Payer: MEDICARE

## 2023-07-27 DIAGNOSIS — R92.8 ABNORMAL MAMMOGRAM OF LEFT BREAST: Primary | ICD-10-CM

## 2023-07-27 DIAGNOSIS — N39.3 STRESS INCONTINENCE: Primary | ICD-10-CM

## 2023-07-27 DIAGNOSIS — N39.41 URGENCY INCONTINENCE: ICD-10-CM

## 2023-07-27 NOTE — TELEPHONE ENCOUNTER
----- Message from Mata Love MD sent at 7/26/2023  8:14 PM EDT -----  Regarding: FW: Mammogram  Contact: [phone]  Talk to me about this please  ----- Message -----  From: Taylor Adams LPN  Sent: 7/26/2023   8:45 AM EDT  To: Mata Love MD  Subject: FW: Mammogram                                    It doesn't look like anything has been ordered.  On the mammogram report you have see same day report.  Im not sure what that means  ----- Message -----  From: Mata Love MD  Sent: 7/25/2023   5:00 PM EDT  To: [facility]  Subject: FW: Mammogram                                    Has this been taken care of?  ----- Message -----  From: Taylor Adams LPN  Sent: 7/25/2023  10:02 AM EDT  To: Mata Love MD  Subject: FW: Mammogram                                    Pt is needing additional views, see mammogram  ----- Message -----  From: Taylor Adams LPN  Sent: 7/25/2023   8:17 AM EDT  To: [facility]  Subject: FW: Mammogram                                      ----- Message -----  From: Dacia Napier  Sent: 7/24/2023   9:13 PM EDT  To: [facility]  Subject: Mammogram                                        I need Dr Love to send an order for a new mammogram. I have a letter saying it needs to be redone. I called into the practice and no order has been sent

## 2023-07-27 NOTE — TELEPHONE ENCOUNTER
Mammo Screening Digital Tomosynthesis Bilateral With CAD (07/17/2023 08:15)     Patient needs order for additional spot compression magnification views

## 2023-07-27 NOTE — TELEPHONE ENCOUNTER
Spoke to the mammogram dept at [facility] and they said she is needing a order for a diagnostic left breast mammogram.   Pt informed that the referrals dept will be in touch with her once its scheduled.

## 2023-07-31 ENCOUNTER — OFFICE VISIT (OUTPATIENT)
Dept: UROLOGY | Facility: CLINIC | Age: 78
End: 2023-07-31
Payer: MEDICARE

## 2023-07-31 VITALS — WEIGHT: 111 LBS | HEIGHT: 62 IN | BODY MASS INDEX: 20.43 KG/M2 | RESPIRATION RATE: 16 BRPM

## 2023-07-31 DIAGNOSIS — N36.42 INTRINSIC SPHINCTER DEFICIENCY: Primary | ICD-10-CM

## 2023-07-31 PROCEDURE — 1159F MED LIST DOCD IN RCRD: CPT | Performed by: NURSE PRACTITIONER

## 2023-07-31 PROCEDURE — 1160F RVW MEDS BY RX/DR IN RCRD: CPT | Performed by: NURSE PRACTITIONER

## 2023-07-31 PROCEDURE — 99214 OFFICE O/P EST MOD 30 MIN: CPT | Performed by: NURSE PRACTITIONER

## 2023-07-31 NOTE — H&P (VIEW-ONLY)
Chief Complaint: Urinary Incontinence (UDS f/u)    Subjective         History of Present Illness  Dacia Napier is a 77 y.o. female presents to [facility] to be seen for f/u recurrent UTI.      Patient had urodynamics test on 7/27/2023.    UDS revealed dress incontinence with leak point pressure of 91 cm/H2O, 30 degrees urethral mobility.          Previous:  Patient just last week began experiencing symptoms of UTI had a urine culture performed which returned positive yesterday and medication was sent in for treatment.  Her last UTI was in January.    Last visit we started patient on Myrbetriq 25 mg daily to see if this would help to alleviate some of her urinary urgency frequency and incontinence.    She did not take this as she was informed that she had CKD and she wanted to discuss this before taking this.     Her last CMP was in 11/2022 and her GFR was 58.6  Creatinine was 1.0.    She is with continued dysuria but has nit started treatment with antibiotics yet.     She is still using estrace.     Patient has had normal urine culture that was positive since we saw her last..     Patient was started on Gemtesa in August 2022.  The medication was working well for the patient however she could not afford the monthly cost of this medicine. she had a cystoscopy with Dr. Paredes on 9/26/2022 which revealed only cystitis cystica she was recommended to continue to drink plenty of fluids and to continue Gemtesa 75 mg daily with a 3-month follow-up.    Patient reached out via [employer] on October 30, 2022 and stated that Gemtesa was making the patient to have bladder spasms and constantly having to urinate and having discomfort when she went.  She called the office on 11/3/2022 and had a urine culture ordered and performed.  She was found to have a urinary tract infection Klebsiella pneumoniae and was prescribed levofloxacin and Florajen.    She called again on 12/14/2022 and requested urine  "culture and urinalysis for potential UTI however urine culture was negative.    She states she feels as if she has a \"yeast infection\"    She states that she stopped the gemtesa due to adverse effects of feeling like it dried her out.     She is still using estrace. She is seeing gynecology today.     She states she is with burning and pain with urination as well as urinary frequency and back aches and pressure in lower abdomen.     She is using vaginal estrace just started using again a week ago.    She still continues to have pain and symptoms when she is with negative CX.     She is with new onset urge incontinence and with frequency.     She voids every couple hours.     She had COVID 6/6/22     She drinks just over 48 oz of water a day.     Never been a smoker.     Urine cultures:   7/18/2022 no growth     7/7/2022 10,000-40,000 colony-forming units per milliliter of Klebsiella pneumoniae resistant to ampicillin, intermittent susceptibility to nitrofurantoin    4/26/2022 no growth      4/8/2022 Klebsiella aerogenes greater than 100,000 colony-forming units per milliliter resistant to cefazolin intermediate susceptibility to nitrofurantoin    1/10/2022 no growth    Objective     Past Medical History:   Diagnosis Date    Anemia 06/2021    Iron prescription    Arrhythmia     Bronchitis     Colon polyp     Diabetes mellitus     GERD (gastroesophageal reflux disease)     Hiatal hernia     Hyperlipidemia     Lung nodule 7/2022    On my ct scan at [facility]    Rapid or irregular heartbeat     Seasonal allergies     Type 2 diabetes mellitus        Past Surgical History:   Procedure Laterality Date    CARDIAC CATHETERIZATION      CATARACT EXTRACTION, BILATERAL      COLONOSCOPY      COLONOSCOPY N/A 06/25/2021    Procedure: COLONOSCOPY;  Surgeon: Ty Partida MD;  Location: [facility] ENDOSCOPY;  Service: General;  Laterality: N/A;  Colon polyp    ENDOSCOPY N/A 06/25/2021    Procedure: ESOPHAGOGASTRODUODENOSCOPY;  " Surgeon: Ty Partida MD;  Location: [facility] ENDOSCOPY;  Service: General;  Laterality: N/A;  Hiatal Hernia    EYE CAPSULOTOMY WITH LASER      HERNIA REPAIR      HYSTERECTOMY      LASIK      TONSILLECTOMY           Current Outpatient Medications:     Accu-Chek Softclix Lancets lancets, Test glucose once each day and as needed, Disp: 100 each, Rfl: 3    aspirin (aspirin) 81 MG EC tablet, Daily., Disp: , Rfl:     benzonatate (TESSALON) 100 MG capsule, Take 1 capsule by mouth 2 (Two) Times a Day As Needed for Cough., Disp: 30 capsule, Rfl: 1    Calcium Carbonate 1500 (600 Ca) MG tablet, 2 (Two) Times a Day With Meals., Disp: , Rfl:     cetirizine (zyrTEC) 10 MG tablet, Take 1 tablet by mouth Daily As Needed., Disp: , Rfl:     Cholecalciferol (Vitamin D3) 50 MCG (2000 UT) tablet, Take 1 tablet by mouth Daily., Disp: , Rfl:     estradiol (ESTRACE) 0.1 MG/GM vaginal cream, Insert 2 g into the vagina Daily., Disp: 42.5 g, Rfl: 3    ezetimibe (ZETIA) 10 MG tablet, Take 1 tablet by mouth Daily., Disp: 90 tablet, Rfl: 0    ferrous sulfate (FeroSul) 325 (65 FE) MG tablet, Take 1 tablet by mouth 2 (Two) Times a Day. (Patient taking differently: Take 1 tablet by mouth Every Other Day.), Disp: 60 tablet, Rfl: 3    glucose blood (Accu-Chek Guide) test strip, For E11.9, Disp: 200 each, Rfl: 2    Mirabegron ER (Myrbetriq) 25 MG tablet sustained-release 24 hour 24 hr tablet, Take 1 tablet by mouth Daily., Disp: 84 tablet, Rfl: 0    Omega-3 1000 MG capsule, Take 1 capsule by mouth Daily., Disp: , Rfl:     omeprazole (priLOSEC) 20 MG capsule, Take 1 capsule by mouth Daily., Disp: 30 capsule, Rfl: 0    rosuvastatin (CRESTOR) 10 MG tablet, Take 1 tablet by mouth Every Night., Disp: 90 tablet, Rfl: 0    Tirzepatide (Mounjaro) 2.5 MG/0.5ML solution pen-injector, Inject 0.5 mL under the skin into the appropriate area as directed Every 7 (Seven) Days., Disp: 2 mL, Rfl: 5    valACYclovir (VALTREX) 500 MG tablet, Take 1 tablet by mouth As  "Needed., Disp: , Rfl:     No Known Allergies     Family History   Problem Relation Age of Onset    Heart disease Mother     Heart failure Mother         Congestive heart failure    Heart disease Father     Heart failure Father         3 heart attacks in 6 years    Heart disease Sister     Cancer Sister         Breast cancer    Heart disease Brother     Cancer Brother         Tongue cancer    Heart failure Brother         Pace maker defibrillator and stents at 40    Cancer Sister         Melanoma skin cancer    Heart failure Brother         By pass    Heart failure Sister         5 by pass    Malig Hyperthermia Neg Hx        Social History     Socioeconomic History    Marital status:    Tobacco Use    Smoking status: Never     Passive exposure: Past    Smokeless tobacco: Never   Vaping Use    Vaping Use: Never used   Substance and Sexual Activity    Alcohol use: Not Currently     Comment: Maybe couple times year    Drug use: Never    Sexual activity: Not Currently     Partners: Male     Birth control/protection: Condom, Pill, Diaphragm, Spermicide       Vital Signs:   Resp 16   Ht 157.5 cm (62\")   Wt 50.3 kg (111 lb)   BMI 20.30 kg/mý      Physical Exam     Result Review :   The following data was reviewed by: JANES Zuñiga on 05/15/2023:  Results for orders placed or performed in visit on 07/17/23   Hepatitis C Antibody    Specimen: Blood   Result Value Ref Range    Hepatitis C Ab Non-Reactive Non-Reactive   Iron Profile    Specimen: Blood   Result Value Ref Range    Iron 102 37 - 145 mcg/dL    Iron Saturation (TSAT) 26 20 - 50 %    Transferrin 265 200 - 360 mg/dL    TIBC 395 298 - 536 mcg/dL   Lipid Panel    Specimen: Blood   Result Value Ref Range    Total Cholesterol 142 0 - 200 mg/dL    Triglycerides 130 0 - 150 mg/dL    HDL Cholesterol 45 40 - 60 mg/dL    LDL Cholesterol  74 0 - 100 mg/dL    VLDL Cholesterol 23 5 - 40 mg/dL    LDL/HDL Ratio 1.58    Microalbumin / Creatinine Urine Ratio - " Urine, Clean Catch    Specimen: Urine, Clean Catch   Result Value Ref Range    Microalbumin/Creatinine Ratio 12.2 mg/g    Creatinine, Urine 172.2 mg/dL    Microalbumin, Urine 2.1 mg/dL   TSH Rfx On Abnormal To Free T4    Specimen: Blood   Result Value Ref Range    TSH 3.990 0.270 - 4.200 uIU/mL   Urinalysis With Culture If Indicated - Urine, Clean Catch    Specimen: Urine, Clean Catch   Result Value Ref Range    Color, UA Yellow Yellow, Straw    Appearance, UA Clear Clear    pH, UA 5.5 5.0 - 8.0    Specific Gravity, UA 1.025 1.005 - 1.030    Glucose, UA Negative Negative    Ketones, UA Negative Negative    Bilirubin, UA Negative Negative    Blood, UA Trace (A) Negative    Protein, UA Negative Negative    Leuk Esterase, UA Negative Negative    Nitrite, UA Negative Negative    Urobilinogen, UA 0.2 E.U./dL 0.2 - 1.0 E.U./dL   Vitamin B12 & Folate    Specimen: Blood   Result Value Ref Range    Folate 7.38 4.78 - 24.20 ng/mL    Vitamin B-12 >2,000 (H) 211 - 946 pg/mL   Vitamin D,25-Hydroxy    Specimen: Blood   Result Value Ref Range    25 Hydroxy, Vitamin D 39.7 30.0 - 100.0 ng/ml   Urinalysis, Microscopic Only - Urine, Clean Catch    Specimen: Urine, Clean Catch   Result Value Ref Range    RBC, UA 0-2 (A) None Seen /HPF    WBC, UA 0-2 (A) None Seen /HPF    Bacteria, UA None Seen None Seen /HPF    Squamous Epithelial Cells, UA 0-2 None Seen, 0-2 /HPF    Methodology Manual Light Microscopy                  Procedures        Assessment and Plan    Diagnoses and all orders for this visit:    1. Intrinsic sphincter deficiency (Primary)  -     Case Request; Standing  -     Case Request    Other orders  -     Follow Anesthesia Guidelines / Protocol; Future  -     Follow Anesthesia Guidelines / Protocol; Standing  -     Verify / Perform Chlorhexidine Skin Prep; Standing  -     Obtain Informed Consent; Future  -     Provide NPO Instructions to Patient; Future  -     Chlorhexidine Skin Prep; Future        She does have exam  findings on UDS that are consistent with intrinsic sphincter deficiency as well as stress urinary incontinence related to urethral mobility.    We discussed options for treatment including a mid urethral sling or urethral bulking agent.    Discussed with the patient each of the procedures as well as risk benefits and alternatives.    Ultimately patient would like to proceed with Bulkamid     discussed urethral bulking agent, new FDA approved agent, Bulkamid, at length.  Patient with evidence of intrinsic sphincter deficiency on examination, believe she would likely benefit from this and would be an optimal candidate.  Risks of the procedure include persistent incontinence, urinary retention or incomplete emptying requiring catheter or additional procedure, need for further procedure, infection, damage to surrounding structures and pain.      Surgery orders placed and Dr. Katz's team will reach out with her to schedule.    I spent 10 minutes caring for Dacia on this date of service. This time includes time spent by me in the following activities:reviewing tests, obtaining and/or reviewing a separately obtained history, performing a medically appropriate examination and/or evaluation , counseling and educating the patient/family/caregiver, ordering medications, tests, or procedures, and documenting information in the medical record  Follow Up   Return for Follow-up with Dr. Katz.  Patient was given instructions and counseling regarding her condition or for health maintenance advice. Please see specific information pulled into the AVS if appropriate.

## 2023-08-02 ENCOUNTER — TELEPHONE (OUTPATIENT)
Dept: CARDIOLOGY | Facility: CLINIC | Age: 78
End: 2023-08-02
Payer: MEDICARE

## 2023-08-02 ENCOUNTER — TELEPHONE (OUTPATIENT)
Dept: UROLOGY | Facility: CLINIC | Age: 78
End: 2023-08-02
Payer: MEDICARE

## 2023-08-02 DIAGNOSIS — R30.0 DYSURIA: ICD-10-CM

## 2023-08-02 DIAGNOSIS — Z01.818 PRE-OP TESTING: Primary | ICD-10-CM

## 2023-08-02 DIAGNOSIS — N39.41 URGENCY INCONTINENCE: ICD-10-CM

## 2023-08-02 PROBLEM — N36.42 INTRINSIC SPHINCTER DEFICIENCY: Status: ACTIVE | Noted: 2023-08-02

## 2023-08-04 ENCOUNTER — HOSPITAL ENCOUNTER (OUTPATIENT)
Dept: MAMMOGRAPHY | Facility: HOSPITAL | Age: 78
Discharge: HOME | End: 2023-08-04
Admitting: FAMILY MEDICINE
Payer: MEDICARE

## 2023-08-04 DIAGNOSIS — R92.8 ABNORMAL MAMMOGRAM OF LEFT BREAST: ICD-10-CM

## 2023-08-04 PROCEDURE — G0279 TOMOSYNTHESIS, MAMMO: HCPCS

## 2023-08-04 PROCEDURE — 77065 DX MAMMO INCL CAD UNI: CPT

## 2023-08-07 DIAGNOSIS — R92.8 ABNORMAL MAMMOGRAM OF LEFT BREAST: Primary | ICD-10-CM

## 2023-08-07 NOTE — Clinical Note
Please sign this.  It was supposed to be a diagnostic left breast in 6 months.  Per mammo on 08/04/23.

## 2023-08-14 VITALS — HEIGHT: 62 IN | WEIGHT: 112.43 LBS | BODY MASS INDEX: 20.69 KG/M2

## 2023-08-14 NOTE — PROGRESS NOTES
"  Dacia Napier is a 77 y.o. female who presents to [facility] for nutrition consult r/t diagnosis of T2DM.  Dacia Napier is referred by JANES Herrera.    Past Medical History:   Diagnosis Date    Anemia 06/2021    Iron prescription    Arrhythmia     Bronchitis     Colon polyp     Diabetes mellitus     GERD (gastroesophageal reflux disease)     Hiatal hernia     Hyperlipidemia     Lung nodule 7/2022    On my ct scan at [facility]    Rapid or irregular heartbeat     Seasonal allergies     Type 2 diabetes mellitus        Anthropometrics    157.5 cm (62\")  51 kg (112 lb 7 oz)  20.56 kg/mý    Diabetes History    Diabetes History  What type of diabetes do you have?: Type 2  Current DM knowledge: good  Have you had diabetes education/teaching in the past?: no  Do you test your blood sugar at home?: yes  Who performs the test?: self  Typical readings: fasting average 110    Education Preferences    Education Preferences  What areas of diabetes would you like to learn about?: diet information    Nutrition Information    Nutrition Information  Enter everything you can remember eating in the last 24 hours (1 day): breakfast- bar/oatmeal/fruit/boiled egg; lunch- meat and cheese, no bread; dinner- stir mann w/ brown rice; snacks- keto snack pack; beverages- water, diet tea, diet soda  What is the biggest challenge you have with your diet?: Knowledge    Education Needs    DM Education Needs  Meter: Has own  Medication: Other injectables  Healthy Eating: RD consult, Reviewed meal plan, Basic meal plan provided  Motivation: Engaged  Teaching Method: Explanation, Discussion, Handouts  Patient Response: Verbalized understanding    DM Goals    DM Goals  Healthy Eating - Goal: Today  Being Active - Goal: Today  Taking Medication - Goal: Today  Monitoring - Goal: Today  Contact Plan: Follow-up medical care      Medications    Current Outpatient Medications   Medication    Accu-Chek Softclix " Lancets    aspirin    benzonatate    Calcium Carbonate    cetirizine    Vitamin D3    estradiol    ezetimibe    ferrous sulfate    Accu-Chek Guide    Mirabegron ER    Omega-3    omeprazole    rosuvastatin    Mounjaro    valACYclovir     Labs       Lab Results   Component Value Date    CHOL 142 07/17/2023    TRIG 130 07/17/2023    HDL 45 07/17/2023    LDL 74 07/17/2023 July 2023- A1c 5.5%    Nutrition counseling provided on carbohydrate counting, portion control, measuring and reading labels.  Discussed eating out and gave suggestions on controlling carbohydrate intake and making healthier food choices.     Meal Plan:     Total Carbohydrates per meal: 2-3 carb servings/meal, at least 3 meals/day; discussed smaller more frequent meals if Mounjaro affects appetite  Lean protein with meals.  Limit added fats.  Snacks: 1 carbohydrate serving (</= 22 g) + 1 protein serving.     Daily exercise encouraged (as recommended by healthcare provider). Discussed the benefits of exercise in lowering blood glucose, blood pressure, cholesterol, stress and controlling body weight.     Advised to continue daily blood glucose monitoring to assist with understanding of factors affecting blood glucose and assist with management of diabetes.  Discussed and provided with target BG ranges.     Literature provided: Diabetes Nutrition Placemat, Choose Your Foods Booklet    Dietitian contact number provided.  Patient encouraged to call with questions or concerns.     Time spent with patient: 42 minutes    Vijaya Marin RDN, ERIC  07/07/2023

## 2023-08-18 ENCOUNTER — LAB (OUTPATIENT)
Dept: LAB | Facility: HOSPITAL | Age: 78
End: 2023-08-18
Payer: MEDICARE

## 2023-08-18 DIAGNOSIS — Z01.818 PRE-OP TESTING: ICD-10-CM

## 2023-08-18 DIAGNOSIS — R30.0 DYSURIA: ICD-10-CM

## 2023-08-18 DIAGNOSIS — N39.41 URGENCY INCONTINENCE: ICD-10-CM

## 2023-08-18 PROCEDURE — 87086 URINE CULTURE/COLONY COUNT: CPT

## 2023-08-20 LAB — BACTERIA SPEC AEROBE CULT: NORMAL

## 2023-08-23 NOTE — PRE-PROCEDURE INSTRUCTIONS
Patient instructed to have no food past midnight, clears up to 2 hours prior to arrival time. Patient instructed to wear no lotions, jewelry or piercing's day of surgery. Patient instructed to take zetia, omeprazole, mirabegron am of surgery.

## 2023-08-25 ENCOUNTER — HOSPITAL ENCOUNTER (OUTPATIENT)
Facility: HOSPITAL | Age: 78
Setting detail: HOSPITAL OUTPATIENT SURGERY
Discharge: HOME | End: 2023-08-25
Attending: UROLOGY | Admitting: UROLOGY
Payer: MEDICARE

## 2023-08-25 ENCOUNTER — ANESTHESIA EVENT (OUTPATIENT)
Dept: PERIOP | Facility: HOSPITAL | Age: 78
End: 2023-08-25
Payer: MEDICARE

## 2023-08-25 ENCOUNTER — ANESTHESIA (OUTPATIENT)
Dept: PERIOP | Facility: HOSPITAL | Age: 78
End: 2023-08-25
Payer: MEDICARE

## 2023-08-25 VITALS
HEART RATE: 67 BPM | RESPIRATION RATE: 16 BRPM | HEIGHT: 62 IN | SYSTOLIC BLOOD PRESSURE: 126 MMHG | DIASTOLIC BLOOD PRESSURE: 74 MMHG | WEIGHT: 111.99 LBS | TEMPERATURE: 97 F | BODY MASS INDEX: 20.61 KG/M2 | OXYGEN SATURATION: 98 %

## 2023-08-25 DIAGNOSIS — N39.3 SUI (STRESS URINARY INCONTINENCE, FEMALE): ICD-10-CM

## 2023-08-25 DIAGNOSIS — N36.42 INTRINSIC SPHINCTER DEFICIENCY: Primary | ICD-10-CM

## 2023-08-25 LAB
GLUCOSE BLDC GLUCOMTR-MCNC: 106 MG/DL (ref 70–99)
GLUCOSE BLDC GLUCOMTR-MCNC: 76 MG/DL (ref 70–99)

## 2023-08-25 PROCEDURE — 25010000002 PROPOFOL 10 MG/ML EMULSION: Performed by: NURSE ANESTHETIST, CERTIFIED REGISTERED

## 2023-08-25 PROCEDURE — 25010000002 CEFTRIAXONE PER 250 MG: Performed by: UROLOGY

## 2023-08-25 PROCEDURE — L8606 SYNTHETIC IMPLNT URINARY 1ML: HCPCS | Performed by: UROLOGY

## 2023-08-25 PROCEDURE — 51715 ENDOSCOPIC INJECTION/IMPLANT: CPT | Performed by: UROLOGY

## 2023-08-25 PROCEDURE — 82948 REAGENT STRIP/BLOOD GLUCOSE: CPT

## 2023-08-25 PROCEDURE — 25010000002 FENTANYL CITRATE (PF) 50 MCG/ML SOLUTION: Performed by: NURSE ANESTHETIST, CERTIFIED REGISTERED

## 2023-08-25 PROCEDURE — 25010000002 MIDAZOLAM PER 1MG: Performed by: ANESTHESIOLOGY

## 2023-08-25 DEVICE — BULKAMID URETHRAL BULKING SYSTEM 2ML
Type: IMPLANTABLE DEVICE | Site: URETHRA | Status: FUNCTIONAL
Brand: BULKAMID

## 2023-08-25 RX ORDER — OXYCODONE HCL 5 MG/5 ML
5 SOLUTION, ORAL ORAL EVERY 4 HOURS PRN
Status: DISCONTINUED | OUTPATIENT
Start: 2023-08-25 | End: 2023-08-25 | Stop reason: HOSPADM

## 2023-08-25 RX ORDER — MEPERIDINE HYDROCHLORIDE 25 MG/ML
12.5 INJECTION INTRAMUSCULAR; INTRAVENOUS; SUBCUTANEOUS
Status: DISCONTINUED | OUTPATIENT
Start: 2023-08-25 | End: 2023-08-25 | Stop reason: HOSPADM

## 2023-08-25 RX ORDER — ONDANSETRON 2 MG/ML
4 INJECTION INTRAMUSCULAR; INTRAVENOUS ONCE AS NEEDED
Status: DISCONTINUED | OUTPATIENT
Start: 2023-08-25 | End: 2023-08-25 | Stop reason: HOSPADM

## 2023-08-25 RX ORDER — FENTANYL CITRATE 50 UG/ML
INJECTION, SOLUTION INTRAMUSCULAR; INTRAVENOUS AS NEEDED
Status: DISCONTINUED | OUTPATIENT
Start: 2023-08-25 | End: 2023-08-25 | Stop reason: SURG

## 2023-08-25 RX ORDER — MIDAZOLAM HYDROCHLORIDE 2 MG/2ML
1 INJECTION, SOLUTION INTRAMUSCULAR; INTRAVENOUS ONCE
Status: COMPLETED | OUTPATIENT
Start: 2023-08-25 | End: 2023-08-25

## 2023-08-25 RX ORDER — IBUPROFEN 600 MG/1
600 TABLET ORAL EVERY 6 HOURS PRN
Status: DISCONTINUED | OUTPATIENT
Start: 2023-08-25 | End: 2023-08-25 | Stop reason: HOSPADM

## 2023-08-25 RX ORDER — MAGNESIUM HYDROXIDE 1200 MG/15ML
LIQUID ORAL AS NEEDED
Status: DISCONTINUED | OUTPATIENT
Start: 2023-08-25 | End: 2023-08-25 | Stop reason: HOSPADM

## 2023-08-25 RX ORDER — CEFTRIAXONE SODIUM 1 G/50ML
1000 INJECTION, SOLUTION INTRAVENOUS EVERY 24 HOURS
Status: DISCONTINUED | OUTPATIENT
Start: 2023-08-25 | End: 2023-08-25 | Stop reason: HOSPADM

## 2023-08-25 RX ORDER — ASPIRIN 81 MG/1
81 TABLET ORAL DAILY
Start: 2023-08-27

## 2023-08-25 RX ORDER — ONDANSETRON 4 MG/1
4 TABLET, FILM COATED ORAL ONCE AS NEEDED
Status: DISCONTINUED | OUTPATIENT
Start: 2023-08-25 | End: 2023-08-25 | Stop reason: HOSPADM

## 2023-08-25 RX ORDER — ACETAMINOPHEN 325 MG/1
650 TABLET ORAL ONCE
Status: DISCONTINUED | OUTPATIENT
Start: 2023-08-25 | End: 2023-08-25 | Stop reason: HOSPADM

## 2023-08-25 RX ORDER — PROMETHAZINE HYDROCHLORIDE 12.5 MG/1
25 TABLET ORAL ONCE AS NEEDED
Status: DISCONTINUED | OUTPATIENT
Start: 2023-08-25 | End: 2023-08-25 | Stop reason: HOSPADM

## 2023-08-25 RX ORDER — ACETAMINOPHEN 500 MG
1000 TABLET ORAL ONCE
Status: COMPLETED | OUTPATIENT
Start: 2023-08-25 | End: 2023-08-25

## 2023-08-25 RX ORDER — PROMETHAZINE HYDROCHLORIDE 12.5 MG/1
12.5 TABLET ORAL ONCE AS NEEDED
Status: DISCONTINUED | OUTPATIENT
Start: 2023-08-25 | End: 2023-08-25 | Stop reason: HOSPADM

## 2023-08-25 RX ORDER — SODIUM CHLORIDE, SODIUM LACTATE, POTASSIUM CHLORIDE, CALCIUM CHLORIDE 600; 310; 30; 20 MG/100ML; MG/100ML; MG/100ML; MG/100ML
9 INJECTION, SOLUTION INTRAVENOUS CONTINUOUS PRN
Status: DISCONTINUED | OUTPATIENT
Start: 2023-08-25 | End: 2023-08-25 | Stop reason: HOSPADM

## 2023-08-25 RX ORDER — PROMETHAZINE HYDROCHLORIDE 25 MG/1
25 SUPPOSITORY RECTAL ONCE AS NEEDED
Status: DISCONTINUED | OUTPATIENT
Start: 2023-08-25 | End: 2023-08-25 | Stop reason: HOSPADM

## 2023-08-25 RX ORDER — LIDOCAINE HYDROCHLORIDE 20 MG/ML
INJECTION, SOLUTION EPIDURAL; INFILTRATION; INTRACAUDAL; PERINEURAL AS NEEDED
Status: DISCONTINUED | OUTPATIENT
Start: 2023-08-25 | End: 2023-08-25 | Stop reason: SURG

## 2023-08-25 RX ADMIN — LIDOCAINE HYDROCHLORIDE 40 MG: 20 INJECTION, SOLUTION EPIDURAL; INFILTRATION; INTRACAUDAL; PERINEURAL at 07:18

## 2023-08-25 RX ADMIN — SODIUM CHLORIDE, POTASSIUM CHLORIDE, SODIUM LACTATE AND CALCIUM CHLORIDE 9 ML/HR: 600; 310; 30; 20 INJECTION, SOLUTION INTRAVENOUS at 07:06

## 2023-08-25 RX ADMIN — PROPOFOL 150 MCG/KG/MIN: 10 INJECTION, EMULSION INTRAVENOUS at 07:18

## 2023-08-25 RX ADMIN — MIDAZOLAM HYDROCHLORIDE 1 MG: 1 INJECTION, SOLUTION INTRAMUSCULAR; INTRAVENOUS at 07:07

## 2023-08-25 RX ADMIN — FENTANYL CITRATE 25 MCG: 50 INJECTION, SOLUTION INTRAMUSCULAR; INTRAVENOUS at 07:18

## 2023-08-25 RX ADMIN — ACETAMINOPHEN 1000 MG: 500 TABLET ORAL at 07:07

## 2023-08-25 RX ADMIN — CEFTRIAXONE SODIUM 1 G: 1 INJECTION, SOLUTION INTRAVENOUS at 07:13

## 2023-08-25 NOTE — INTERVAL H&P NOTE
No acute distress, well-nourished  Regular rate and rhythm, no chest retractions  Lungs clear nonlabored    H&P reviewed. The patient was examined and there are no changes to the H&P.

## 2023-08-25 NOTE — DISCHARGE INSTRUCTIONS
DISCHARGE INSTRUCTIONS CYSTOSCOPY      For your surgery you had:  General anesthesia (you may have a sore throat for the first 24 hours)  IV sedation  Local anesthesia  Monitored anesthesia care  You received a medicated patch for nausea prevention today (behind your ear). It is recommended that you remove it 24-48 hours post-operatively. It must be removed within 72 hours.  You have received an anesthesia medication today that can cause hormonal forms of birth control to be ineffective. You should use a different form of birth control (to prevent pregnancy) for 7 days.   You may experience dizziness, drowsiness, or lightheadedness for several hours following surgery.  Do not stay alone today or tonight.  Limit your activity for 24 hours.  You should not drive, operate machinery, drink alcohol, or sign legally binding documents for 24 hours or while you are taking pain medication.  Resume your diet slowly.  Follow any special dietary instructions you may have been given by your doctor.  Last dose of pain medication given at:   .  NOTIFY YOUR DOCTOR IF YOU EXPERIENCE ANY OF THE FOLLOWING:  Temperature greater than 101 degrees Fahrenheit  Shaking Chills  Redness or excessive drainage from incision  Nausea, vomiting and/or pain that is not controlled by prescribed medications  Increase in bleeding or bleeding that is excessive  Unable to urinate in 6 hours after surgery  If unable to reach your doctor, please go to the closest Emergency Room   Following your cystoscopy exam, you may experience burning upon urination.  You may also pass some bloody urine.  If the burning sensation and/or bloody urine should persist beyond 48 hours, call your doctor.  To encourage kidney and bladder function, you should drink as much fluid as possible.  If you have difficulty urinating, try sitting in a bath tub of warm water.  If you become uncomfortable because you cannot urinate, call your doctor or come to the Emergency Room at the  hospital.  Medications per physician instructions as indicated on Discharge Medication Information Sheet.  You should see   for follow-up care  on   .  Phone number:        SPECIAL INSTRUCTIONS:

## 2023-08-25 NOTE — ANESTHESIA POSTPROCEDURE EVALUATION
Patient: Dacia Napier    Procedure Summary       Date: 08/25/23 Room / Location: [facility] OR  / [facility] MAIN OR    Anesthesia Start: 0713 Anesthesia Stop: 0754    Procedure: CYSTOSCOPY WITH BULKING AGENT INJECTION (Urethra) Diagnosis:       Intrinsic sphincter deficiency      (Intrinsic sphincter deficiency [N36.42])    Surgeons: Chrissie Katz MD Provider: Reyes, Mirabelle, DO    Anesthesia Type: MAC ASA Status: 2            Anesthesia Type: MAC    Vitals  Vitals Value Taken Time   BP 99/54 08/25/23 0840   Temp 36.1 øC (97 øF) 08/25/23 0825   Pulse 56 08/25/23 0842   Resp 15 08/25/23 0840   SpO2 97 % 08/25/23 0842   Vitals shown include unvalidated device data.        Post Anesthesia Care and Evaluation    Patient location during evaluation: bedside  Patient participation: complete - patient participated  Level of consciousness: awake  Pain management: adequate    Airway patency: patent  PONV Status: none  Cardiovascular status: acceptable and stable  Respiratory status: acceptable  Hydration status: acceptable    Comments: An Anesthesiologist personally participated in the most demanding procedures (including induction and emergence if applicable) in the anesthesia plan, monitored the course of anesthesia administration at frequent intervals and remained physically present and available for immediate diagnosis and treatment of emergencies.

## 2023-08-25 NOTE — OP NOTE
Preoperative diagnosis  Intrinsic sphincter deficiency, stress urinary incontinence    Postoperative diagnosis  Intrinsic sphincter deficiency, stress urinary incontinence    Procedure performed  1.  Rigid cystoscopy  2.  Injection of bulking med bulking agent, 2 mL  (CPT 73279)     Attending surgeon  Chrissie Katz MD     Anesthesia  mac    EBL  0 mL    Complications  None    Specimen  None    Findings  Adequate coapting cushions noted in 4 quadrants of the urethra    Complications  None    Indications  77 y.o. female agreed to undergo the above named procedure after discussion of the alternatives, risks and benefits.   Informed consent was obtained.      Procedure  After proper consent was obtained, patient was taken back to the operating room and MAC anesthesia was performed.  Patient was placed in the dorsolithotomy position and prepped and draped in the normal sterile fashion for cystoscopy.  Timeout was performed.  A 21 Latvian rigid cystoscope was inserted through the urethral meatus and passed into the bladder.  Pan cystoscopy was performed in standard 360 degree manner.  No bladder mucosal abnormalities noted.  Trigone appeared normal, bilateral orthotopic ureters.  Patient's bladder was then emptied.    At the start of the procedure, theBulkamid sheath and the water inflow was adjusted to produce an adequate stream. The Bulkamid Needle was then placed _ of the way into the needle channel of the rotatable sheath and the entire Bulkamid system was then advanced into the urethra until the bladder is visualised and inspected. The Bulkamid needle was then advanced into the needle channel on the rotatable sheath until the tip of the sheath is adjacent to the bladder neck.     The sheath was then rotated to the 7 o'clock position. The needed was then extend into the bladder until the 2cm rich on the needle is visible. The Bulkamid system was then retracted until the tip of the needle was resting on the bladder  neck. The needle was then retracted into the sheath and approximately 1.5cm from the bladder neck the Bulkamid system was pressed parallel against the urethral wall and the needle is then advanced into the submucosal tissue ensuring that the bevel of the needle was facing towards the lumen. The needle was advanced approximately 0.5cm, and the Bulkamid hydrogel was then injected until the Bulkamid cushion was visible and reached the midline of the urethral lumen.    The needle was then retracted back into the rotatable sheath, and rotated to the next injection site. Subsequent injections were preformed at 5 o'clock, 2 o'clock and 10 o'clock all along the same plane as the original injection until all (4) cushions met at the midline of the urethral lumen. 2mls Total of Bulkamid Hydrogel was used.  Once good coaptation of the pillows was appreciated, the scope was removed and the procedure was deemed terminated.  She was then awoken from anesthesia and transferred to PACU in good condition.      Signed:  Chrissie Katz MD  08/25/23  07:53 EDT

## 2023-08-25 NOTE — NURSING NOTE
Dr buchanan notified on floor at 10:54 of void 100, scan 435 after using #24 catheter in and out 3 times and then took to bathroom to void, instructed to sen home with #24 indwelling catheter and to pull out 6 hour before follow up on Monday, cather inserted teaching complete, pt discharged

## 2023-08-25 NOTE — ANESTHESIA PREPROCEDURE EVALUATION
Anesthesia Evaluation     Patient summary reviewed and Nursing notes reviewed   no history of anesthetic complications:   NPO Solid Status: > 8 hours  NPO Liquid Status: > 2 hours           Airway   Mallampati: II  TM distance: >3 FB  Neck ROM: full  No difficulty expected  Dental - normal exam     Pulmonary - negative pulmonary ROS and normal exam    breath sounds clear to auscultation  Cardiovascular - normal exam  Exercise tolerance: good (4-7 METS)    Rhythm: regular  Rate: normal    (+) valvular problems/murmurs, hyperlipidemia      Neuro/Psych- negative ROS  GI/Hepatic/Renal/Endo    (+) GERD, renal disease CRI, diabetes mellitus (prediabetic)    Musculoskeletal (-) negative ROS    Abdominal    Substance History - negative use     OB/GYN negative ob/gyn ROS         Other - negative ROS       ROS/Med Hx Other: PAT Nursing Notes unavailable.                 Anesthesia Plan    ASA 2     MAC     (Patient understands anesthesia not responsible for dental damage.)  intravenous induction     Anesthetic plan, risks, benefits, and alternatives have been provided, discussed and informed consent has been obtained with: patient.  Pre-procedure education provided  Use of blood products discussed with patient  Consented to blood products.    Plan discussed with CRNA.    CODE STATUS:

## 2023-08-28 ENCOUNTER — CLINICAL SUPPORT (OUTPATIENT)
Dept: UROLOGY | Facility: CLINIC | Age: 78
End: 2023-08-28
Payer: MEDICARE

## 2023-08-28 ENCOUNTER — TELEPHONE (OUTPATIENT)
Dept: UROLOGY | Facility: CLINIC | Age: 78
End: 2023-08-28
Payer: MEDICARE

## 2023-08-28 VITALS — BODY MASS INDEX: 20.24 KG/M2 | HEIGHT: 62 IN | RESPIRATION RATE: 16 BRPM | WEIGHT: 110 LBS

## 2023-08-28 DIAGNOSIS — R33.9 RETENTION OF URINE: Primary | ICD-10-CM

## 2023-08-28 LAB — URINE VOLUME: 0

## 2023-08-28 NOTE — TELEPHONE ENCOUNTER
CALLED PT TO SEE HOW SHE WAS DOING. LEFT MESSAGE ON VOICEMAIL REQ A CALL BACK. NEED TO KNOW IF SHE IS URINATING NORMALLY AFTER REMOVING HER CATH THIS MORNING.

## 2023-08-28 NOTE — TELEPHONE ENCOUNTER
Pt called and stated that is has been hydrating, and has been able to urinate fine. Reports 1500 intake, and 1000 output since 930am today. Pt also reports slight blood tinged urine. Advised pt to call us if unable to void for 6+ hours, advised no need to keep measuring output. Pt expressed understanding and is agreeable.

## 2023-08-28 NOTE — PROGRESS NOTES
Pharmacy notified of elevated Vanco trough level. Will adjust Vancomycin dose. Pt presented to office post BulkAmid procedure on 8/25. Pt was sent home with a catheter and instructed to remove it this morning. Pt states that she removed it at 3am this morning. Of note, she said no one told her that she needed to deflate the balloon, so she pulled it out, still inflated. Pt reports gross hematuria and pain at time of removal. Pt bladder scan was 0. Advised pt to hydrate, sent her with a hat to measure output, and advised that I would call her after lunch to check on her. Advised that should she not be able to void at all, she needs to call me. Pt expressed understanding and is agreeable.

## 2023-09-06 ENCOUNTER — TELEPHONE (OUTPATIENT)
Dept: FAMILY MEDICINE CLINIC | Age: 78
End: 2023-09-06
Payer: MEDICARE

## 2023-09-06 NOTE — TELEPHONE ENCOUNTER
Pt due for Prolia 10/1/23    Last Dexa -2.0  DEXA Bone Density Axial (07/17/2023 08:15)     Last CMP- Calcium 9.5  Comprehensive Metabolic Panel (07/07/2023 11:30)     Last office Visit   Office Visit with Mata Love MD (07/13/2023)

## 2023-09-14 ENCOUNTER — OFFICE VISIT (OUTPATIENT)
Dept: UROLOGY | Facility: CLINIC | Age: 78
End: 2023-09-14
Payer: MEDICARE

## 2023-09-14 ENCOUNTER — OFFICE VISIT (OUTPATIENT)
Dept: CARDIOLOGY | Facility: CLINIC | Age: 78
End: 2023-09-14
Payer: MEDICARE

## 2023-09-14 VITALS
HEART RATE: 62 BPM | HEIGHT: 62 IN | SYSTOLIC BLOOD PRESSURE: 122 MMHG | DIASTOLIC BLOOD PRESSURE: 79 MMHG | BODY MASS INDEX: 20.83 KG/M2 | WEIGHT: 113.2 LBS

## 2023-09-14 VITALS — WEIGHT: 111.2 LBS | BODY MASS INDEX: 20.46 KG/M2 | RESPIRATION RATE: 16 BRPM | HEIGHT: 62 IN

## 2023-09-14 DIAGNOSIS — E78.2 MIXED DYSLIPIDEMIA: Primary | ICD-10-CM

## 2023-09-14 DIAGNOSIS — N39.3 STRESS INCONTINENCE: ICD-10-CM

## 2023-09-14 DIAGNOSIS — Z87.440 PERSONAL HISTORY OF URINARY (TRACT) INFECTIONS: ICD-10-CM

## 2023-09-14 DIAGNOSIS — R00.2 PALPITATIONS: ICD-10-CM

## 2023-09-14 DIAGNOSIS — I47.1 PAROXYSMAL SVT (SUPRAVENTRICULAR TACHYCARDIA): ICD-10-CM

## 2023-09-14 DIAGNOSIS — N36.42 INTRINSIC SPHINCTER DEFICIENCY: Primary | ICD-10-CM

## 2023-09-14 DIAGNOSIS — R94.31 ABNORMAL ELECTROCARDIOGRAM (ECG) (EKG): ICD-10-CM

## 2023-09-14 LAB — URINE VOLUME: 0

## 2023-09-14 PROCEDURE — 99214 OFFICE O/P EST MOD 30 MIN: CPT | Performed by: INTERNAL MEDICINE

## 2023-09-14 PROCEDURE — 93000 ELECTROCARDIOGRAM COMPLETE: CPT | Performed by: INTERNAL MEDICINE

## 2023-09-14 NOTE — PROGRESS NOTES
Chief Complaint  Irregular Heart Beat    Subjective      Patient is here for follow-up on paroxysmal SVT.  She has sporadic palpitations which are infrequent.  She is able to abort them with carotid massage.  Her palpitations are associated with dizziness but she denies presyncope or syncope.  She has no chest pain, dyspnea, edema or other complaints.  She used to be on a beta-blocker in the past but she stopped it due to side effects.    Past Medical History:   Diagnosis Date    Anemia 06/2021    Iron prescription    Arrhythmia     Bronchitis     Colon polyp     Diabetes mellitus     GERD (gastroesophageal reflux disease)     Hiatal hernia     Hyperlipidemia     Lung nodule 7/2022    On my ct scan at [facility]    Rapid or irregular heartbeat     Seasonal allergies     Type 2 diabetes mellitus     PRE DM         Current Outpatient Medications:     Accu-Chek Softclix Lancets lancets, Test glucose once each day and as needed, Disp: 100 each, Rfl: 3    aspirin 81 MG EC tablet, Take 1 tablet by mouth Daily. LAST DOSE 08/18/23 PT STOPPED ON OWN, Disp: , Rfl:     Calcium Carbonate 1500 (600 Ca) MG tablet, 2 (Two) Times a Day With Meals., Disp: , Rfl:     cetirizine (zyrTEC) 10 MG tablet, Take 1 tablet by mouth Daily As Needed., Disp: , Rfl:     Cholecalciferol (Vitamin D3) 50 MCG (2000 UT) tablet, Take 1 tablet by mouth Daily., Disp: , Rfl:     estradiol (ESTRACE) 0.1 MG/GM vaginal cream, Insert 2 g into the vagina Daily., Disp: 42.5 g, Rfl: 3    ezetimibe (ZETIA) 10 MG tablet, Take 1 tablet by mouth Daily., Disp: 90 tablet, Rfl: 0    ferrous sulfate (FeroSul) 325 (65 FE) MG tablet, Take 1 tablet by mouth 2 (Two) Times a Day. (Patient taking differently: Take 1 tablet by mouth Every Other Day.), Disp: 60 tablet, Rfl: 3    glucose blood (Accu-Chek Guide) test strip, For E11.9, Disp: 200 each, Rfl: 2    Omega-3 1000 MG capsule, Take 1 capsule by mouth Daily., Disp: , Rfl:     omeprazole (priLOSEC) 20 MG capsule, Take 1  "capsule by mouth Daily., Disp: 30 capsule, Rfl: 0    rosuvastatin (CRESTOR) 10 MG tablet, Take 1 tablet by mouth Every Night., Disp: 90 tablet, Rfl: 0    Tirzepatide (Mounjaro) 2.5 MG/0.5ML solution pen-injector, Inject 0.5 mL under the skin into the appropriate area as directed Every 7 (Seven) Days. (Patient taking differently: Inject 0.5 mL under the skin into the appropriate area as directed Every 7 (Seven) Days. TAKES ON THURSDAY NIGHT LAST DOSE 08/24/23), Disp: 2 mL, Rfl: 5    valACYclovir (VALTREX) 500 MG tablet, Take 1 tablet by mouth As Needed., Disp: , Rfl:     Medications Discontinued During This Encounter   Medication Reason    Mirabegron ER (Myrbetriq) 25 MG tablet sustained-release 24 hour 24 hr tablet *Therapy completed     No Known Allergies     Social History     Tobacco Use    Smoking status: Never     Passive exposure: Past    Smokeless tobacco: Never   Vaping Use    Vaping Use: Never used   Substance Use Topics    Alcohol use: Not Currently     Comment: Maybe couple times year    Drug use: Never       Family History   Problem Relation Age of Onset    Heart disease Mother     Heart failure Mother         Congestive heart failure    Heart disease Father     Heart failure Father         3 heart attacks in 6 years    Heart disease Sister     Cancer Sister         Breast cancer    Heart disease Brother     Cancer Brother         Tongue cancer    Heart failure Brother         Pace maker defibrillator and stents at 40    Cancer Sister         Melanoma skin cancer    Heart failure Brother         By pass    Heart failure Sister         5 by pass    Malig Hyperthermia Neg Hx         Objective     /79   Pulse 62   Ht 157.5 cm (62.01\")   Wt 51.3 kg (113 lb 3.2 oz)   BMI 20.70 kg/m²       Physical Exam    General Appearance:   no acute distress  Alert and oriented x3  HENT:   lips not cyanotic  Atraumatic  Neck:  No jvd   supple  Respiratory:  no respiratory distress  normal breath sounds  no " rales  Cardiovascular:  no S3, no S4   no murmur  no rub  Extremities  No cyanosis  lower extremity edema: none    Skin:   warm, dry  No rashes      Result Review :     No results found for: PROBNP  CMP          11/29/2022    11:07 7/7/2023    11:30   CMP   Glucose 103  106    BUN 13  16    Creatinine 1.00  1.01    EGFR 58.1  57.5    Sodium 138  139    Potassium 4.8  4.6    Chloride 104  103    Calcium 9.5  9.5    Total Protein 6.8  6.6    Albumin 4.20  4.5    Globulin 2.6  2.1    Total Bilirubin 0.4  0.5    Alkaline Phosphatase 59  67    AST (SGOT) 26  19    ALT (SGPT) 17  14    Albumin/Globulin Ratio 1.6  2.1    BUN/Creatinine Ratio 13.0  15.8    Anion Gap 7.1  8.0      CBC w/diff          11/29/2022    11:07 7/7/2023    11:30   CBC w/Diff   WBC 6.06  7.11    RBC 4.81  4.73    Hemoglobin 14.0  14.0    Hematocrit 44.5  43.6    MCV 92.5  92.2    MCH 29.1  29.6    MCHC 31.5  32.1    RDW 12.3  12.5    Platelets 251  234    Neutrophil Rel % 66.6  68.9    Immature Granulocyte Rel % 0.2  0.3    Lymphocyte Rel % 23.1  21.1    Monocyte Rel % 7.8  7.5    Eosinophil Rel % 2.0  1.8    Basophil Rel % 0.3  0.4       Lab Results   Component Value Date    TSH 3.990 07/17/2023      Lab Results   Component Value Date    FREET4 1.17 06/24/2022      No results found for: DDIMERQUANT  Magnesium   Date Value Ref Range Status   06/24/2022 2.0 1.6 - 2.4 mg/dL Final      No results found for: DIGOXIN   No results found for: TROPONINT        Lipid Panel          7/17/2023    08:20   Lipid Panel   Total Cholesterol 142    Triglycerides 130    HDL Cholesterol 45    VLDL Cholesterol 23    LDL Cholesterol  74    LDL/HDL Ratio 1.58      No results found for: POCTROP    Results for orders placed in visit on 05/27/22    Adult Transthoracic Echo Complete W/ Cont if Necessary Per Protocol    Interpretation Summary  · Estimated left ventricular EF was in agreement with the calculated left ventricular EF. Left ventricular ejection fraction appears to  be 61 - 65%. Left ventricular systolic function is normal.  · Left ventricular diastolic function is consistent with (grade I) impaired relaxation.  · Mild aortic valve and trace mitral valve regurgitation is noted.       ECG 12 Lead    Date/Time: 9/14/2023 1:41 PM  Performed by: Halie Washington MD  Authorized by: Halie Washington MD   Comparison: compared with previous ECG   Similar to previous ECG  Rhythm: sinus rhythm  BPM: 64  Conduction: incomplete right bundle branch block  Comments: Diffuse nonspecific T wave changes.               Diagnoses and all orders for this visit:    1. Mixed dyslipidemia (Primary)    2. Palpitations  -     Adult Transthoracic Echo Complete W/ Cont if Necessary Per Protocol; Future  -     Treadmill Stress Test; Future    3. Abnormal electrocardiogram (ECG) (EKG)  -     Treadmill Stress Test; Future    4. Paroxysmal SVT (supraventricular tachycardia)    Other orders  -     ECG 12 Lead      Assessment:     Palpitations: Patient with intermittent palpitations, more likely related to paroxysmal SVT.  Her palpitations are infrequent.  She is able to abort them with carotid message.  She reports associated dizziness but denies presyncope or syncope.  She used to be on beta-blocker in the past but she could not tolerate it due to associated fatigue and tiredness.  Continue clinical monitoring.  EP study with ablation can be considered if her palpitations worsen in frequency or duration.  I will check a treadmill stress test to rule out exercise-induced arrhythmias.  Echo will be done for LVEF and valvular function.      Mixed dyslipidemia: Recent lipid profile was noted and was unremarkable.  Continue Zetia    Follow Up     Return in about 6 months (around 3/14/2024) for with Dr Washington.        Patient was given instructions and counseling regarding her condition or for health maintenance advice. Please see specific information pulled into the AVS if appropriate.

## 2023-09-14 NOTE — PROGRESS NOTES
"    UROLOGY OFFICE follow up NOTE    Subjective   HPI  Dacia Napier is a 77 y.o. female.  Presents for follow-up of AMANDA, ISD after cystoscopy with Bulkamid injection, 8/25/2023.  Patient of urology Cierra LOPEZ.  Presents with  who is also here for a visit today.    Reports doing very well after Bulkamid.  No longer wearing a pad.  Also notes resolution of her generalized burning and irritation since the procedure.  Pleased with the result.    Does report history of recurrent UTI.  No UTI since the procedure.    Results for orders placed or performed in visit on 09/14/23   Bladder Scan   Result Value Ref Range    Urine Volume 0        Review of systems  A review of systems was performed, and positive findings are noted in the HPI.    Objective     Vital Signs:   Resp 16   Ht 157.5 cm (62\")   Wt 50.4 kg (111 lb 3.2 oz)   BMI 20.34 kg/m²       Physical exam  No acute distress, well-nourished  Awake alert and oriented  Mood normal; affect normal    Bladder Scan interpretation 09/14/2023    Estimation of residual urine via BVI 3000 [place] Bladder Scan  Performed by: Jesenia Crews RN  Residual Urine: 0 ml  Indication: Intrinsic sphincter deficiency    Stress incontinence    Personal history of urinary (tract) infections   Position: Supine  Examination: Incremental scanning of the suprapubic area using 2.0 MHz transducer using copious amounts of acoustic gel.   Findings: An anechoic area was demonstrated which represented the bladder, with measurement of residual urine as noted. I inspected this myself. In that the residual urine was stable or insignificant, refer to plan for treatment and plan necessary at this time.     Problem List:  Patient Active Problem List   Diagnosis    Postmenopausal atrophic vaginitis    Agenesis of right kidney    Type 2 diabetes mellitus without complication    Hiatal hernia with GERD without esophagitis    High cholesterol    Postmenopausal osteoporosis    Recurrent urinary " "tract infection    Other allergic rhinitis    Palpitations (sees Cardiology--negative w/u)     Heart murmur (neg ECHO in 2021)     Stage 3a chronic kidney disease    Urgency incontinence    Recurrent herpes labialis    Low serum iron    Intrinsic sphincter deficiency       Assessment & Plan   Diagnoses and all orders for this visit:    1. Intrinsic sphincter deficiency (Primary)  -     Bladder Scan    2. Stress incontinence  -     Bladder Scan    3. Personal history of urinary (tract) infections      Doing very well after cystoscopy with Bulkamid  Emptying bladder without postvoid residual bladder scan.  Resolution of her AMANDA, ISD     Patient continue to monitor symptoms; aware of the possibility of a \"top off\" within the first couple months of initial injection should she have recurrence of symptoms.    Again, discussed the durability of the procedure after reaching max benefit to be about 7 years.  Activity may be as tolerated    Reports history of recurrent UTI  Emphasized the importance of obtaining culture when symptoms arise prior to treatment with antibiotic  Specimen cups provided; she is to call should she experience symptoms so that culture may be obtained.  Aware that treatment will be per sensitivities and culture result.    Follow-up 1 year or earlier as needed  All questions addressed    "

## 2023-09-22 DIAGNOSIS — K44.9 HIATAL HERNIA WITH GERD WITHOUT ESOPHAGITIS: ICD-10-CM

## 2023-09-22 DIAGNOSIS — K21.9 HIATAL HERNIA WITH GERD WITHOUT ESOPHAGITIS: ICD-10-CM

## 2023-09-22 NOTE — TELEPHONE ENCOUNTER
Rx Refill Note  Requested Prescriptions     Pending Prescriptions Disp Refills    omeprazole (priLOSEC) 20 MG capsule [Pharmacy Med Name: OMEPRAZOLE 20MG CAPSULES] 30 capsule 0     Sig: TAKE 1 CAPSULE BY MOUTH DAILY      Last office visit with prescribing clinician: 7/13/2023   Last telemedicine visit with prescribing clinician: Visit date not found   Next office visit with prescribing clinician: 1/8/2024   Proton Pump Inhibitors Protocol Failed     Taylor Adams LPN  09/22/23, 10:52 EDT

## 2023-09-23 RX ORDER — OMEPRAZOLE 20 MG/1
20 CAPSULE, DELAYED RELEASE ORAL DAILY
Qty: 90 CAPSULE | Refills: 3 | Status: SHIPPED | OUTPATIENT
Start: 2023-09-23

## 2023-11-28 ENCOUNTER — CLINICAL SUPPORT (OUTPATIENT)
Dept: FAMILY MEDICINE CLINIC | Age: 78
End: 2023-11-28
Payer: MEDICARE

## 2023-11-28 DIAGNOSIS — Z23 NEED FOR VACCINATION: Primary | ICD-10-CM

## 2023-11-28 PROCEDURE — 90662 IIV NO PRSV INCREASED AG IM: CPT | Performed by: FAMILY MEDICINE

## 2023-11-28 PROCEDURE — G0008 ADMIN INFLUENZA VIRUS VAC: HCPCS | Performed by: FAMILY MEDICINE

## 2023-12-18 RX ORDER — ESTRADIOL 0.1 MG/G
CREAM VAGINAL
Qty: 42.5 G | Refills: 3 | OUTPATIENT
Start: 2023-12-18

## 2023-12-19 ENCOUNTER — TRANSCRIBE ORDERS (OUTPATIENT)
Dept: ADMINISTRATIVE | Facility: HOSPITAL | Age: 78
End: 2023-12-19
Payer: MEDICARE

## 2023-12-19 DIAGNOSIS — N18.30 STAGE 3 CHRONIC KIDNEY DISEASE, UNSPECIFIED WHETHER STAGE 3A OR 3B CKD: Primary | ICD-10-CM

## 2023-12-19 DIAGNOSIS — I10 HTN (HYPERTENSION) WITH GOAL TO BE DETERMINED: ICD-10-CM

## 2023-12-27 RX ORDER — FLUCONAZOLE 150 MG/1
150 TABLET ORAL ONCE
Qty: 1 TABLET | Refills: 0 | OUTPATIENT
Start: 2023-12-27 | End: 2023-12-27

## 2023-12-28 ENCOUNTER — LAB (OUTPATIENT)
Dept: LAB | Facility: HOSPITAL | Age: 78
End: 2023-12-28
Payer: MEDICARE

## 2023-12-28 DIAGNOSIS — N18.30 STAGE 3 CHRONIC KIDNEY DISEASE, UNSPECIFIED WHETHER STAGE 3A OR 3B CKD: ICD-10-CM

## 2023-12-28 DIAGNOSIS — I10 HTN (HYPERTENSION) WITH GOAL TO BE DETERMINED: ICD-10-CM

## 2023-12-28 LAB
BACTERIA UR QL AUTO: NORMAL /HPF
BASOPHILS # BLD AUTO: 0.03 10*3/MM3 (ref 0–0.2)
BASOPHILS NFR BLD AUTO: 0.4 % (ref 0–1.5)
BILIRUB UR QL STRIP: NEGATIVE
CLARITY UR: CLEAR
COLOR UR: YELLOW
CREAT UR-MCNC: 120.3 MG/DL
DEPRECATED RDW RBC AUTO: 43.3 FL (ref 37–54)
EOSINOPHIL # BLD AUTO: 0.15 10*3/MM3 (ref 0–0.4)
EOSINOPHIL NFR BLD AUTO: 2.2 % (ref 0.3–6.2)
ERYTHROCYTE [DISTWIDTH] IN BLOOD BY AUTOMATED COUNT: 12.6 % (ref 12.3–15.4)
GLUCOSE UR STRIP-MCNC: NEGATIVE MG/DL
HCT VFR BLD AUTO: 44.7 % (ref 34–46.6)
HGB BLD-MCNC: 14 G/DL (ref 12–15.9)
HGB UR QL STRIP.AUTO: ABNORMAL
IMM GRANULOCYTES # BLD AUTO: 0.01 10*3/MM3 (ref 0–0.05)
IMM GRANULOCYTES NFR BLD AUTO: 0.1 % (ref 0–0.5)
KETONES UR QL STRIP: NEGATIVE
LEUKOCYTE ESTERASE UR QL STRIP.AUTO: ABNORMAL
LYMPHOCYTES # BLD AUTO: 1.29 10*3/MM3 (ref 0.7–3.1)
LYMPHOCYTES NFR BLD AUTO: 19.2 % (ref 19.6–45.3)
MCH RBC QN AUTO: 29.3 PG (ref 26.6–33)
MCHC RBC AUTO-ENTMCNC: 31.3 G/DL (ref 31.5–35.7)
MCV RBC AUTO: 93.5 FL (ref 79–97)
MONOCYTES # BLD AUTO: 0.55 10*3/MM3 (ref 0.1–0.9)
MONOCYTES NFR BLD AUTO: 8.2 % (ref 5–12)
NEUTROPHILS NFR BLD AUTO: 4.68 10*3/MM3 (ref 1.7–7)
NEUTROPHILS NFR BLD AUTO: 69.9 % (ref 42.7–76)
NITRITE UR QL STRIP: NEGATIVE
PH UR STRIP.AUTO: 6 [PH] (ref 5–8)
PLATELET # BLD AUTO: 262 10*3/MM3 (ref 140–450)
PMV BLD AUTO: 9.4 FL (ref 6–12)
PROT ?TM UR-MCNC: 7.4 MG/DL
PROT UR QL STRIP: NEGATIVE
PROT/CREAT UR: 0.06 MG/G{CREAT}
RBC # BLD AUTO: 4.78 10*6/MM3 (ref 3.77–5.28)
RBC # UR STRIP: NORMAL /HPF
REF LAB TEST METHOD: NORMAL
SP GR UR STRIP: 1.02 (ref 1–1.03)
SQUAMOUS #/AREA URNS HPF: NORMAL /HPF
UROBILINOGEN UR QL STRIP: ABNORMAL
WBC # UR STRIP: NORMAL /HPF
WBC NRBC COR # BLD AUTO: 6.71 10*3/MM3 (ref 3.4–10.8)

## 2023-12-28 PROCEDURE — 82043 UR ALBUMIN QUANTITATIVE: CPT

## 2023-12-28 PROCEDURE — 85025 COMPLETE CBC W/AUTO DIFF WBC: CPT

## 2023-12-28 PROCEDURE — 82570 ASSAY OF URINE CREATININE: CPT

## 2023-12-28 PROCEDURE — 36415 COLL VENOUS BLD VENIPUNCTURE: CPT

## 2023-12-28 PROCEDURE — 84156 ASSAY OF PROTEIN URINE: CPT

## 2023-12-28 PROCEDURE — 80053 COMPREHEN METABOLIC PANEL: CPT

## 2023-12-28 PROCEDURE — 81001 URINALYSIS AUTO W/SCOPE: CPT

## 2023-12-29 LAB
ALBUMIN SERPL-MCNC: 4.6 G/DL (ref 3.5–5.2)
ALBUMIN UR-MCNC: <1.2 MG/DL
ALBUMIN/GLOB SERPL: 2.1 G/DL
ALP SERPL-CCNC: 85 U/L (ref 39–117)
ALT SERPL W P-5'-P-CCNC: 14 U/L (ref 1–33)
ANION GAP SERPL CALCULATED.3IONS-SCNC: 10 MMOL/L (ref 5–15)
AST SERPL-CCNC: 17 U/L (ref 1–32)
BILIRUB SERPL-MCNC: 0.4 MG/DL (ref 0–1.2)
BUN SERPL-MCNC: 15 MG/DL (ref 8–23)
BUN/CREAT SERPL: 14.3 (ref 7–25)
CALCIUM SPEC-SCNC: 10.2 MG/DL (ref 8.6–10.5)
CHLORIDE SERPL-SCNC: 101 MMOL/L (ref 98–107)
CO2 SERPL-SCNC: 28 MMOL/L (ref 22–29)
CREAT SERPL-MCNC: 1.05 MG/DL (ref 0.57–1)
CREAT UR-MCNC: 101.3 MG/DL
EGFRCR SERPLBLD CKD-EPI 2021: 54.5 ML/MIN/1.73
GLOBULIN UR ELPH-MCNC: 2.2 GM/DL
GLUCOSE SERPL-MCNC: 130 MG/DL (ref 65–99)
MICROALBUMIN/CREAT UR: NORMAL MG/G{CREAT}
POTASSIUM SERPL-SCNC: 4.6 MMOL/L (ref 3.5–5.2)
PROT SERPL-MCNC: 6.8 G/DL (ref 6–8.5)
SODIUM SERPL-SCNC: 139 MMOL/L (ref 136–145)

## 2024-01-08 ENCOUNTER — OFFICE VISIT (OUTPATIENT)
Dept: FAMILY MEDICINE CLINIC | Age: 79
End: 2024-01-08
Payer: MEDICARE

## 2024-01-08 VITALS
HEIGHT: 62 IN | DIASTOLIC BLOOD PRESSURE: 70 MMHG | BODY MASS INDEX: 20.39 KG/M2 | WEIGHT: 110.8 LBS | HEART RATE: 79 BPM | SYSTOLIC BLOOD PRESSURE: 103 MMHG

## 2024-01-08 DIAGNOSIS — K21.9 HIATAL HERNIA WITH GERD WITHOUT ESOPHAGITIS: ICD-10-CM

## 2024-01-08 DIAGNOSIS — Z00.00 MEDICARE ANNUAL WELLNESS VISIT, SUBSEQUENT: Primary | ICD-10-CM

## 2024-01-08 DIAGNOSIS — E78.00 HIGH CHOLESTEROL: ICD-10-CM

## 2024-01-08 DIAGNOSIS — N18.31 STAGE 3A CHRONIC KIDNEY DISEASE: ICD-10-CM

## 2024-01-08 DIAGNOSIS — E61.1 LOW SERUM IRON: ICD-10-CM

## 2024-01-08 DIAGNOSIS — E11.9 TYPE 2 DIABETES MELLITUS WITHOUT COMPLICATION, WITHOUT LONG-TERM CURRENT USE OF INSULIN: ICD-10-CM

## 2024-01-08 DIAGNOSIS — K44.9 HIATAL HERNIA WITH GERD WITHOUT ESOPHAGITIS: ICD-10-CM

## 2024-01-08 DIAGNOSIS — Z86.010 HISTORY OF COLON POLYPS: ICD-10-CM

## 2024-01-08 PROBLEM — Z86.0100 HISTORY OF COLON POLYPS: Status: ACTIVE | Noted: 2024-01-08

## 2024-01-08 PROCEDURE — 1159F MED LIST DOCD IN RCRD: CPT | Performed by: FAMILY MEDICINE

## 2024-01-08 PROCEDURE — G0439 PPPS, SUBSEQ VISIT: HCPCS | Performed by: FAMILY MEDICINE

## 2024-01-08 PROCEDURE — 1170F FXNL STATUS ASSESSED: CPT | Performed by: FAMILY MEDICINE

## 2024-01-08 PROCEDURE — 1160F RVW MEDS BY RX/DR IN RCRD: CPT | Performed by: FAMILY MEDICINE

## 2024-01-08 PROCEDURE — 99214 OFFICE O/P EST MOD 30 MIN: CPT | Performed by: FAMILY MEDICINE

## 2024-01-08 NOTE — PROGRESS NOTES
The ABCs of the Annual Wellness Visit  Subsequent Medicare Wellness Visit    Subjective    Dacia Napier is a 78 y.o. female who presents for a Subsequent Medicare Wellness Visit.    The following portions of the patient's history were reviewed and   updated as appropriate: allergies, current medications, past family history, past medical history, past social history, past surgical history, and problem list.    Compared to one year ago, the patient feels her physical   health is the same.    Compared to one year ago, the patient feels her mental   health is better.    Recent Hospitalizations:  She was not admitted to the hospital during the last year.       Current Medical Providers:  Patient Care Team:  Mata Love MD as PCP - General (Family Medicine)  Rory Paredes MD as Consulting Physician (Urology)  Annabelle Buckley APRN as Nurse Practitioner (Endocrinology)  Nohemi Brunson RegSched Rep as Medical Assistant  Efrem Ventura MD as Consulting Physician (Cardiology)  Tessa Rg MD as Consulting Physician (Nephrology)  Chrissie Katz MD as Consulting Physician (Urology)    Outpatient Medications Prior to Visit   Medication Sig Dispense Refill    Accu-Chek Softclix Lancets lancets Test glucose once each day and as needed 100 each 3    aspirin 81 MG EC tablet Take 1 tablet by mouth Daily. LAST DOSE 08/18/23 PT STOPPED ON OWN      Calcium Carbonate 1500 (600 Ca) MG tablet 2 (Two) Times a Day With Meals.      cetirizine (zyrTEC) 10 MG tablet Take 1 tablet by mouth Daily As Needed.      Cholecalciferol (Vitamin D3) 50 MCG (2000 UT) tablet Take 1 tablet by mouth Daily.      estradiol (ESTRACE) 0.1 MG/GM vaginal cream Insert 2 g into the vagina Daily. 42.5 g 3    ezetimibe (ZETIA) 10 MG tablet Take 1 tablet by mouth Daily. 90 tablet 0    ferrous sulfate (FeroSul) 325 (65 FE) MG tablet Take 1 tablet by mouth 2 (Two) Times a Day. (Patient taking differently: Take 1 tablet by mouth  Every Other Day.) 60 tablet 3    glucose blood (Accu-Chek Guide) test strip For E11.9 200 each 2    Omega-3 1000 MG capsule Take 1 capsule by mouth Daily.      omeprazole (priLOSEC) 20 MG capsule TAKE 1 CAPSULE BY MOUTH DAILY 90 capsule 3    rosuvastatin (CRESTOR) 10 MG tablet Take 1 tablet by mouth Every Night. 90 tablet 0    Tirzepatide (Mounjaro) 2.5 MG/0.5ML solution pen-injector Inject 0.5 mL under the skin into the appropriate area as directed Every 7 (Seven) Days. (Patient taking differently: Inject 0.5 mL under the skin into the appropriate area as directed Every 7 (Seven) Days. TAKES ON THURSDAY NIGHT LAST DOSE 08/24/23) 2 mL 5    valACYclovir (VALTREX) 500 MG tablet Take 1 tablet by mouth As Needed.      denosumab (PROLIA) 60 MG/ML solution prefilled syringe syringe Inject 1 mL under the skin into the appropriate area as directed Every 6 (Six) Months. 1 mL 0     No facility-administered medications prior to visit.       No opioid medication identified on active medication list. I have reviewed chart for other potential  high risk medication/s and harmful drug interactions in the elderly.        Aspirin is on active medication list. Aspirin use is indicated based on review of current medical condition/s. Pros and cons of this therapy have been discussed today. Benefits of this medication outweigh potential harm.  Patient has been encouraged to continue taking this medication.  .      Patient Active Problem List   Diagnosis    Postmenopausal atrophic vaginitis    Agenesis of right kidney    Type 2 diabetes mellitus without complication    Hiatal hernia with GERD without esophagitis    High cholesterol    Postmenopausal osteoporosis    Recurrent urinary tract infection    Other allergic rhinitis    Palpitations (sees Cardiology--negative w/u)     Heart murmur (neg ECHO in 2021)     Stage 3a chronic kidney disease    Medicare annual wellness visit, subsequent    Urgency incontinence    Recurrent cold sores     "Low serum iron    Intrinsic sphincter deficiency    History of colon polyps (last scope was in )     Advance Care Planning   Advance Care Planning     Advance Directive is not on file.  ACP discussion was held with the patient during this visit. Patient has an advance directive (not in EMR), copy requested.     Objective    Vitals:    24 1303   BP: 103/70   BP Location: Left arm   Patient Position: Sitting   Pulse: 79   Weight: 50.3 kg (110 lb 12.8 oz)   Height: 157.5 cm (62\")   PainSc: 0-No pain     Estimated body mass index is 20.27 kg/m² as calculated from the following:    Height as of this encounter: 157.5 cm (62\").    Weight as of this encounter: 50.3 kg (110 lb 12.8 oz).    BMI is within normal parameters. No other follow-up for BMI required.      Does the patient have evidence of cognitive impairment? No          HEALTH RISK ASSESSMENT    Smoking Status:  Social History     Tobacco Use   Smoking Status Never    Passive exposure: Past   Smokeless Tobacco Never     Alcohol Consumption:  Social History     Substance and Sexual Activity   Alcohol Use Not Currently    Comment: Maybe couple times year     Fall Risk Screen:    STEADI Fall Risk Assessment was completed, and patient is at LOW risk for falls.Assessment completed on:2024    Depression Screenin/8/2024     1:01 PM   PHQ-2/PHQ-9 Depression Screening   Little Interest or Pleasure in Doing Things 0-->not at all   Feeling Down, Depressed or Hopeless 0-->not at all   PHQ-9: Brief Depression Severity Measure Score 0       Health Habits and Functional and Cognitive Screenin/8/2024     1:01 PM   Functional & Cognitive Status   Do you have difficulty preparing food and eating? No   Do you have difficulty bathing yourself, getting dressed or grooming yourself? No   Do you have difficulty using the toilet? No   Do you have difficulty moving around from place to place? No   Do you have trouble with steps or getting out of a bed or a " chair? No   Current Diet Well Balanced Diet   Dental Exam Up to date   Eye Exam Up to date   Exercise (times per week) 3 times per week   Current Exercises Include Walking   Do you need help using the phone?  No   Are you deaf or do you have serious difficulty hearing?  No   Do you need help to go to places out of walking distance? No   Do you need help shopping? No   Do you need help preparing meals?  No   Do you need help with housework?  No   Do you need help with laundry? No   Do you need help taking your medications? No   Do you need help managing money? No   Do you ever drive or ride in a car without wearing a seat belt? No   Have you felt unusual stress, anger or loneliness in the last month? Yes   Who do you live with? Alone   If you need help, do you have trouble finding someone available to you? No   Do you have difficulty concentrating, remembering or making decisions? No       Age-appropriate Screening Schedule:  Refer to the list below for future screening recommendations based on patient's age, sex and/or medical conditions. Orders for these recommended tests are listed in the plan section. The patient has been provided with a written plan.    Health Maintenance   Topic Date Due    COVID-19 Vaccine (2 - 2023-24 season) 09/01/2023    HEMOGLOBIN A1C  01/07/2024    TDAP/TD VACCINES (2 - Td or Tdap) 01/08/2025 (Originally 10/1/2022)    LIPID PANEL  07/17/2024    DIABETIC EYE EXAM  11/17/2024    URINE MICROALBUMIN  12/28/2024    ANNUAL WELLNESS VISIT  01/08/2025    DXA SCAN  07/17/2025    HEPATITIS C SCREENING  Completed    INFLUENZA VACCINE  Completed    Pneumococcal Vaccine 65+  Completed    ZOSTER VACCINE  Completed    COLORECTAL CANCER SCREENING  Discontinued                  CMS Preventative Services Quick Reference  Risk Factors Identified During Encounter  None Identified  The above risks/problems have been discussed with the patient.  Pertinent information has been shared with the patient in the  "After Visit Summary.  An After Visit Summary and PPPS were made available to the patient.    Follow Up:   Next Medicare Wellness visit to be scheduled in 1 year.       Additional E&M Note during same encounter follows:  Patient has multiple medical problems which are significant and separately identifiable that require additional work above and beyond the Medicare Wellness Visit.      Chief Complaint  Medicare Wellness-subsequent    Subjective        --NO RECENT HGA1C BUT RANDOM SUGAR   --LAST LIPIDS WERE OK, NO ISSUES WITH MEDS  --GRD IS WELL CONTROLLED WITH THE PPI  --DUE FOR SOME ROUTINE LABS AND SHE WOULD LIKE TO SEE A GASTROENTEROLOGIST FOR HER HISTORY OF COLON POLYPS       Dacia Napier is also being seen today for SUGAR, CHOLESTEROL, IRON    Review of Systems   Constitutional:  Negative for activity change, appetite change, chills, fatigue and fever.   HENT:  Negative for congestion, ear pain, hearing loss, rhinorrhea and sore throat.    Eyes:  Negative for discharge and visual disturbance.   Respiratory:  Negative for cough and shortness of breath.    Cardiovascular:  Negative for chest pain, palpitations and leg swelling.   Gastrointestinal:  Negative for abdominal pain, diarrhea, nausea and vomiting.   Genitourinary:  Negative for dysuria and hematuria.   Musculoskeletal:  Negative for arthralgias and myalgias.   Psychiatric/Behavioral:  Negative for dysphoric mood.        Objective   Vital Signs:  /70 (BP Location: Left arm, Patient Position: Sitting)   Pulse 79   Ht 157.5 cm (62\")   Wt 50.3 kg (110 lb 12.8 oz)   BMI 20.27 kg/m²     Physical Exam  Vitals and nursing note reviewed.   Constitutional:       General: She is not in acute distress.     Appearance: Normal appearance.   HENT:      Right Ear: Tympanic membrane normal.      Left Ear: Tympanic membrane normal.      Mouth/Throat:      Pharynx: Oropharynx is clear.   Eyes:      Conjunctiva/sclera: Conjunctivae normal. "   Cardiovascular:      Rate and Rhythm: Normal rate and regular rhythm.      Heart sounds: Normal heart sounds. No murmur heard.  Pulmonary:      Effort: Pulmonary effort is normal.      Breath sounds: Normal breath sounds.   Abdominal:      General: Bowel sounds are normal.      Palpations: Abdomen is soft.      Tenderness: There is no abdominal tenderness.   Musculoskeletal:      Cervical back: Neck supple.      Right lower leg: No edema.      Left lower leg: No edema.   Lymphadenopathy:      Cervical: No cervical adenopathy.   Neurological:      General: No focal deficit present.      Mental Status: She is alert.      Cranial Nerves: No cranial nerve deficit.      Coordination: Coordination normal.      Gait: Gait normal.   Psychiatric:         Mood and Affect: Mood normal.         Behavior: Behavior normal.          The following data was reviewed by: Mata Love MD on 01/08/2024:  Common labs          7/7/2023    11:30 7/7/2023    14:57 7/17/2023    08:20 12/28/2023    10:29   Common Labs   Glucose 106    130    BUN 16    15    Creatinine 1.01    1.05    Sodium 139    139    Potassium 4.6    4.6    Chloride 103    101    Calcium 9.5    10.2    Albumin 4.5    4.6    Total Bilirubin 0.5    0.4    Alkaline Phosphatase 67    85    AST (SGOT) 19    17    ALT (SGPT) 14    14    WBC 7.11    6.71    Hemoglobin 14.0    14.0    Hematocrit 43.6    44.7    Platelets 234    262    Total Cholesterol   142     Triglycerides   130     HDL Cholesterol   45     LDL Cholesterol    74     Hemoglobin A1C  5.5      Microalbumin, Urine   2.1  <1.2                 Assessment and Plan   Diagnoses and all orders for this visit:    1. Medicare annual wellness visit, subsequent (Primary)  Assessment & Plan:  ADVICE GIVEN RE:  SEATBELT USE, ALCOHOL USE, HEALTHY DIET, ROUTINE EYE AND DENTAL EXAM, ROUTINE VACCINATIONS.        2. Hiatal hernia with GERD without esophagitis  Assessment & Plan:  IMPROVED WITH CURRENT TREATMENT,  CONTINUE SAME, WILL REEVALUATE AT NEXT VISIT       3. High cholesterol  Assessment & Plan:  Lipid abnormalities are improving with treatment.  Nutritional counseling was provided.  Lipids will be reassessed in 6 months.    Orders:  -     Lipid Panel; Future    4. Low serum iron  -     Iron Profile; Future    5. Type 2 diabetes mellitus without complication, without long-term current use of insulin  Assessment & Plan:  Diabetes is improving with treatment.   Continue current treatment regimen.  Diabetes will be reassessed in 6 months.    Orders:  -     Hemoglobin A1c; Future    6. History of colon polyps (last scope was in 2021)  -     Ambulatory Referral to Gastroenterology    7. Stage 3a chronic kidney disease  Comments:  LABS AS NOTED             Follow Up   Return in about 6 months (around 7/8/2024).  Patient was given instructions and counseling regarding her condition or for health maintenance advice. Please see specific information pulled into the AVS if appropriate.

## 2024-01-12 ENCOUNTER — TELEPHONE (OUTPATIENT)
Dept: PULMONOLOGY | Facility: CLINIC | Age: 79
End: 2024-01-12

## 2024-01-15 DIAGNOSIS — E11.22 CONTROLLED TYPE 2 DIABETES MELLITUS WITH STAGE 3 CHRONIC KIDNEY DISEASE, WITHOUT LONG-TERM CURRENT USE OF INSULIN: ICD-10-CM

## 2024-01-15 DIAGNOSIS — N18.30 CONTROLLED TYPE 2 DIABETES MELLITUS WITH STAGE 3 CHRONIC KIDNEY DISEASE, WITHOUT LONG-TERM CURRENT USE OF INSULIN: ICD-10-CM

## 2024-01-15 DIAGNOSIS — R91.1 LUNG NODULE: Primary | ICD-10-CM

## 2024-01-15 RX ORDER — TIRZEPATIDE 2.5 MG/.5ML
INJECTION, SOLUTION SUBCUTANEOUS
Qty: 2 ML | Refills: 0 | Status: SHIPPED | OUTPATIENT
Start: 2024-01-15

## 2024-01-16 DIAGNOSIS — E11.9 TYPE 2 DIABETES MELLITUS WITHOUT COMPLICATION, WITHOUT LONG-TERM CURRENT USE OF INSULIN: ICD-10-CM

## 2024-01-16 RX ORDER — EZETIMIBE 10 MG/1
10 TABLET ORAL DAILY
Qty: 90 TABLET | Refills: 0 | Status: SHIPPED | OUTPATIENT
Start: 2024-01-16

## 2024-01-29 ENCOUNTER — HOSPITAL ENCOUNTER (OUTPATIENT)
Dept: CT IMAGING | Facility: HOSPITAL | Age: 79
Discharge: HOME | End: 2024-01-29
Admitting: INTERNAL MEDICINE
Payer: MEDICARE

## 2024-01-29 DIAGNOSIS — R91.1 LUNG NODULE: ICD-10-CM

## 2024-01-29 LAB
CREAT BLDA-MCNC: 1.1 MG/DL (ref 0.6–1.3)
EGFRCR SERPLBLD CKD-EPI 2021: 51.5 ML/MIN/1.73

## 2024-01-29 PROCEDURE — 25510000001 IOPAMIDOL PER 1 ML: Performed by: INTERNAL MEDICINE

## 2024-01-29 PROCEDURE — 71260 CT THORAX DX C+: CPT

## 2024-01-29 PROCEDURE — 82565 ASSAY OF CREATININE: CPT

## 2024-01-29 RX ADMIN — IOPAMIDOL 100 ML: 755 INJECTION, SOLUTION INTRAVENOUS at 13:57

## 2024-01-31 RX ORDER — ESTRADIOL 0.1 MG/G
2 CREAM VAGINAL DAILY
Qty: 42.5 G | Refills: 3 | Status: SHIPPED | OUTPATIENT
Start: 2024-01-31

## 2024-02-01 ENCOUNTER — LAB (OUTPATIENT)
Dept: LAB | Facility: HOSPITAL | Age: 79
End: 2024-02-01
Payer: MEDICARE

## 2024-02-01 ENCOUNTER — HOSPITAL ENCOUNTER (OUTPATIENT)
Dept: CARDIOLOGY | Facility: HOSPITAL | Age: 79
Discharge: HOME | End: 2024-02-01
Payer: MEDICARE

## 2024-02-01 DIAGNOSIS — E11.9 TYPE 2 DIABETES MELLITUS WITHOUT COMPLICATION, WITHOUT LONG-TERM CURRENT USE OF INSULIN: ICD-10-CM

## 2024-02-01 DIAGNOSIS — R00.2 PALPITATIONS: ICD-10-CM

## 2024-02-01 DIAGNOSIS — E78.00 HIGH CHOLESTEROL: ICD-10-CM

## 2024-02-01 DIAGNOSIS — E61.1 LOW SERUM IRON: ICD-10-CM

## 2024-02-01 LAB
CHOLEST SERPL-MCNC: 133 MG/DL (ref 0–200)
HBA1C MFR BLD: 5.9 % (ref 4.8–5.6)
HDLC SERPL-MCNC: 58 MG/DL (ref 40–60)
IRON 24H UR-MRATE: 92 MCG/DL (ref 37–145)
IRON SATN MFR SERPL: 22 % (ref 20–50)
LDLC SERPL CALC-MCNC: 56 MG/DL (ref 0–100)
LDLC/HDLC SERPL: 0.92 {RATIO}
TIBC SERPL-MCNC: 416 MCG/DL (ref 298–536)
TRANSFERRIN SERPL-MCNC: 279 MG/DL (ref 200–360)
TRIGL SERPL-MCNC: 107 MG/DL (ref 0–150)
VLDLC SERPL-MCNC: 19 MG/DL (ref 5–40)

## 2024-02-01 PROCEDURE — 36415 COLL VENOUS BLD VENIPUNCTURE: CPT

## 2024-02-01 PROCEDURE — 93306 TTE W/DOPPLER COMPLETE: CPT

## 2024-02-01 PROCEDURE — 84466 ASSAY OF TRANSFERRIN: CPT

## 2024-02-01 PROCEDURE — 83540 ASSAY OF IRON: CPT

## 2024-02-01 PROCEDURE — 83036 HEMOGLOBIN GLYCOSYLATED A1C: CPT

## 2024-02-01 PROCEDURE — 80061 LIPID PANEL: CPT

## 2024-02-02 LAB
BH CV ECHO MEAS - AI P1/2T: 586.4 MSEC
BH CV ECHO MEAS - AO ROOT DIAM: 2.8 CM
BH CV ECHO MEAS - EDV(CUBED): 79.1 ML
BH CV ECHO MEAS - EDV(MOD-SP2): 31.4 ML
BH CV ECHO MEAS - EDV(MOD-SP4): 32.1 ML
BH CV ECHO MEAS - EF(MOD-BP): 65.7 %
BH CV ECHO MEAS - EF(MOD-SP2): 73.9 %
BH CV ECHO MEAS - EF(MOD-SP4): 55.5 %
BH CV ECHO MEAS - ESV(CUBED): 19.3 ML
BH CV ECHO MEAS - ESV(MOD-SP2): 8.2 ML
BH CV ECHO MEAS - ESV(MOD-SP4): 14.3 ML
BH CV ECHO MEAS - FS: 37.5 %
BH CV ECHO MEAS - IVS/LVPW: 0.96 CM
BH CV ECHO MEAS - IVSD: 0.83 CM
BH CV ECHO MEAS - LA DIMENSION: 3.2 CM
BH CV ECHO MEAS - LAT PEAK E' VEL: 7 CM/SEC
BH CV ECHO MEAS - LV MASS(C)D: 114.3 GRAMS
BH CV ECHO MEAS - LVIDD: 4.3 CM
BH CV ECHO MEAS - LVIDS: 2.7 CM
BH CV ECHO MEAS - LVPWD: 0.87 CM
BH CV ECHO MEAS - MED PEAK E' VEL: 5.2 CM/SEC
BH CV ECHO MEAS - MV A MAX VEL: 67.3 CM/SEC
BH CV ECHO MEAS - MV DEC SLOPE: 296.5 CM/SEC2
BH CV ECHO MEAS - MV DEC TIME: 0.15 SEC
BH CV ECHO MEAS - MV E MAX VEL: 45.4 CM/SEC
BH CV ECHO MEAS - MV E/A: 0.67
BH CV ECHO MEAS - RVDD: 2.3 CM
BH CV ECHO MEAS - SV(MOD-SP2): 23.2 ML
BH CV ECHO MEAS - SV(MOD-SP4): 17.8 ML
BH CV ECHO MEASUREMENTS AVERAGE E/E' RATIO: 7.44

## 2024-02-02 NOTE — PROGRESS NOTES
Echo demonstrates normal LVEF.  There is mild aortic valve regurgitation and mild mitral valve regurgitation.  There is mild mitral valve prolapse.  Continue to monitor clinically.  Continue with scheduled follow-up.

## 2024-02-05 ENCOUNTER — TRANSCRIBE ORDERS (OUTPATIENT)
Dept: ADMINISTRATIVE | Facility: HOSPITAL | Age: 79
End: 2024-02-05
Payer: MEDICARE

## 2024-02-05 ENCOUNTER — TELEPHONE (OUTPATIENT)
Dept: UROLOGY | Facility: CLINIC | Age: 79
End: 2024-02-05
Payer: MEDICARE

## 2024-02-05 DIAGNOSIS — N13.30 HYDRONEPHROSIS, UNSPECIFIED HYDRONEPHROSIS TYPE: Primary | ICD-10-CM

## 2024-02-05 NOTE — TELEPHONE ENCOUNTER
HERMANN CALLED FROM OFFICE OF LUZMARIA SMITH.  SHE ASKED FOR AN APPOINTMENT WITH DR. DARLING FOR THIS WEEK FOR RIGHT HYDRONEPHROSIS.  IT WAS SEEN ON CT CHEST FROM  01/29/24.    PLEASE NOTE PATIENT'S INSURANCE.

## 2024-02-05 NOTE — TELEPHONE ENCOUNTER
PT CALLED BACK, SHE SAID THAT SHE HAS SEEN HER PCP, PULMONARY, AND HAD SEVERAL TESTS AT [facility] THIS YEAR AND NO ONE HAS QUESTIONED HER INSURANCE AND THERE HAVE BEEN NO ISSUES. SHE DOES NOT KNOW IF SHE GOT A CONTINUATION OF CARE.

## 2024-02-05 NOTE — TELEPHONE ENCOUNTER
RAJAT CALLED FROM OFFICE OF DR. SMITH.  SHE SAID HE WANTS PATIENT SEEN ASAP BY WHOMEVER CAN SEE HER.  SHE HAS SEEN DR. DARLING BEFORE.  SHE SAID HE IS CONCERNED THAT THE PATIENT WILL LOSE HER KIDNEY , BECAUSE THE HYDRONEPHROSIS IS SO BAD.    I TOLD HER I DON'T THINK WE ARE IN NETWORK WITH THE PATIENT'S INSURANCE.    SHE ASKED FOR SOMEONE TO CALL HER BACK.

## 2024-02-05 NOTE — TELEPHONE ENCOUNTER
"SPOKE TO HERMANN AT DR SMITH'S OFFICE AND ADVISED HER THAT THE PT WILL NEED TO CALL HER INSURANCE COMPANY DIRECTLY AND SEE IF SHE HAS OUT OF NETWORK BENEFITS OR COULD GET A CONTINUITY OF CARE; ADVISED THAT AS ON 1/1, [facility] AND [facility] NO LONGER ACCEPTS HER INSURANCE. OFFERED TO CALL THE PT DIRECTLY TO DISCUSS THIS WITH THEM. HERMANN SAID THAT THEY WILL JUST \"CALL SOMEONE ELSE.\"  "

## 2024-02-06 ENCOUNTER — PATIENT MESSAGE (OUTPATIENT)
Dept: UROLOGY | Facility: CLINIC | Age: 79
End: 2024-02-06
Payer: MEDICARE

## 2024-02-06 ENCOUNTER — LAB (OUTPATIENT)
Dept: LAB | Facility: HOSPITAL | Age: 79
End: 2024-02-06
Payer: MEDICARE

## 2024-02-06 ENCOUNTER — TRANSCRIBE ORDERS (OUTPATIENT)
Dept: ADMINISTRATIVE | Facility: HOSPITAL | Age: 79
End: 2024-02-06
Payer: MEDICARE

## 2024-02-06 ENCOUNTER — HOSPITAL ENCOUNTER (OUTPATIENT)
Dept: CT IMAGING | Facility: HOSPITAL | Age: 79
Discharge: HOME | End: 2024-02-06
Payer: MEDICARE

## 2024-02-06 DIAGNOSIS — N39.0 URINARY TRACT INFECTION WITHOUT HEMATURIA, SITE UNSPECIFIED: ICD-10-CM

## 2024-02-06 DIAGNOSIS — N13.30 HYDRONEPHROSIS, UNSPECIFIED HYDRONEPHROSIS TYPE: ICD-10-CM

## 2024-02-06 DIAGNOSIS — N39.0 URINARY TRACT INFECTION WITHOUT HEMATURIA, SITE UNSPECIFIED: Primary | ICD-10-CM

## 2024-02-06 PROCEDURE — 87086 URINE CULTURE/COLONY COUNT: CPT

## 2024-02-06 PROCEDURE — 74176 CT ABD & PELVIS W/O CONTRAST: CPT

## 2024-02-06 NOTE — TELEPHONE ENCOUNTER
From: Dacia Napier  To: Chrissie Katz  Sent: 2/6/2024 7:36 AM EST  Subject: Appointment     I don’t understand why I was turned down for an appointment because of my insurance. All the other [facility] providers are taking me.

## 2024-02-08 LAB — BACTERIA SPEC AEROBE CULT: NO GROWTH

## 2024-02-09 ENCOUNTER — HOSPITAL ENCOUNTER (OUTPATIENT)
Dept: MAMMOGRAPHY | Facility: HOSPITAL | Age: 79
Discharge: HOME | End: 2024-02-09
Payer: MEDICARE

## 2024-02-09 DIAGNOSIS — R92.8 ABNORMAL MAMMOGRAM OF LEFT BREAST: ICD-10-CM

## 2024-02-09 PROCEDURE — G0279 TOMOSYNTHESIS, MAMMO: HCPCS

## 2024-02-09 PROCEDURE — 77065 DX MAMMO INCL CAD UNI: CPT

## 2024-02-13 DIAGNOSIS — N18.30 CONTROLLED TYPE 2 DIABETES MELLITUS WITH STAGE 3 CHRONIC KIDNEY DISEASE, WITHOUT LONG-TERM CURRENT USE OF INSULIN: ICD-10-CM

## 2024-02-13 DIAGNOSIS — E11.22 CONTROLLED TYPE 2 DIABETES MELLITUS WITH STAGE 3 CHRONIC KIDNEY DISEASE, WITHOUT LONG-TERM CURRENT USE OF INSULIN: ICD-10-CM

## 2024-02-13 RX ORDER — BLOOD SUGAR DIAGNOSTIC
STRIP MISCELLANEOUS
Qty: 200 EACH | Refills: 3 | Status: SHIPPED | OUTPATIENT
Start: 2024-02-13

## 2024-02-19 ENCOUNTER — PATIENT MESSAGE (OUTPATIENT)
Dept: DIABETES SERVICES | Facility: HOSPITAL | Age: 79
End: 2024-02-19
Payer: MEDICARE

## 2024-02-19 DIAGNOSIS — N18.30 CONTROLLED TYPE 2 DIABETES MELLITUS WITH STAGE 3 CHRONIC KIDNEY DISEASE, WITHOUT LONG-TERM CURRENT USE OF INSULIN: ICD-10-CM

## 2024-02-19 DIAGNOSIS — E11.22 CONTROLLED TYPE 2 DIABETES MELLITUS WITH STAGE 3 CHRONIC KIDNEY DISEASE, WITHOUT LONG-TERM CURRENT USE OF INSULIN: ICD-10-CM

## 2024-02-26 ENCOUNTER — OFFICE VISIT (OUTPATIENT)
Dept: CARDIOLOGY | Facility: CLINIC | Age: 79
End: 2024-02-26
Payer: MEDICARE

## 2024-02-26 ENCOUNTER — OFFICE VISIT (OUTPATIENT)
Dept: PULMONOLOGY | Facility: CLINIC | Age: 79
End: 2024-02-26
Payer: MEDICARE

## 2024-02-26 VITALS
HEART RATE: 68 BPM | DIASTOLIC BLOOD PRESSURE: 65 MMHG | BODY MASS INDEX: 21.16 KG/M2 | HEIGHT: 62 IN | WEIGHT: 115 LBS | SYSTOLIC BLOOD PRESSURE: 98 MMHG

## 2024-02-26 VITALS
BODY MASS INDEX: 20.87 KG/M2 | HEART RATE: 67 BPM | DIASTOLIC BLOOD PRESSURE: 73 MMHG | TEMPERATURE: 97.2 F | SYSTOLIC BLOOD PRESSURE: 109 MMHG | HEIGHT: 62 IN | RESPIRATION RATE: 16 BRPM | OXYGEN SATURATION: 95 % | WEIGHT: 113.4 LBS

## 2024-02-26 DIAGNOSIS — I95.1 AUTONOMIC ORTHOSTATIC HYPOTENSION: ICD-10-CM

## 2024-02-26 DIAGNOSIS — N18.30 CONTROLLED TYPE 2 DIABETES MELLITUS WITH STAGE 3 CHRONIC KIDNEY DISEASE, WITHOUT LONG-TERM CURRENT USE OF INSULIN: ICD-10-CM

## 2024-02-26 DIAGNOSIS — E78.2 MIXED DYSLIPIDEMIA: ICD-10-CM

## 2024-02-26 DIAGNOSIS — E11.22 CONTROLLED TYPE 2 DIABETES MELLITUS WITH STAGE 3 CHRONIC KIDNEY DISEASE, WITHOUT LONG-TERM CURRENT USE OF INSULIN: ICD-10-CM

## 2024-02-26 DIAGNOSIS — R91.1 LUNG NODULE: Primary | ICD-10-CM

## 2024-02-26 DIAGNOSIS — I47.10 PAROXYSMAL SVT (SUPRAVENTRICULAR TACHYCARDIA): ICD-10-CM

## 2024-02-26 DIAGNOSIS — R00.2 PALPITATIONS: Primary | ICD-10-CM

## 2024-02-26 PROCEDURE — 99214 OFFICE O/P EST MOD 30 MIN: CPT

## 2024-02-26 PROCEDURE — 1160F RVW MEDS BY RX/DR IN RCRD: CPT

## 2024-02-26 PROCEDURE — 99213 OFFICE O/P EST LOW 20 MIN: CPT | Performed by: INTERNAL MEDICINE

## 2024-02-26 PROCEDURE — 1160F RVW MEDS BY RX/DR IN RCRD: CPT | Performed by: INTERNAL MEDICINE

## 2024-02-26 PROCEDURE — 1159F MED LIST DOCD IN RCRD: CPT

## 2024-02-26 PROCEDURE — 1159F MED LIST DOCD IN RCRD: CPT | Performed by: INTERNAL MEDICINE

## 2024-02-26 NOTE — TELEPHONE ENCOUNTER
[facility] faxed a medical clarification request on Estrace vaginal cream 0.1mg/ gm.  The medication is typically dosed 1-4 grams daily for 2 weeks, then decreased to 2-3 times weekly.  Max dose= 4 grams.   Please clarify and resend.

## 2024-02-26 NOTE — PROGRESS NOTES
Pulmonary Office Follow-up    Subjective     Dacia Napier is seen today at the office for   Chief Complaint   Patient presents with    Follow-up    Lung Nodule         HPI  Dacia Napier is a 78 y.o. female with a PMH significant for lung nodule for follow-up patient denies any shortness of breath cough wheeze or expectoration there is no history of tobacco abuse      Tobacco use history:  Never smoker      Patient Active Problem List   Diagnosis    Postmenopausal atrophic vaginitis    Agenesis of right kidney    Type 2 diabetes mellitus without complication    Hiatal hernia with GERD without esophagitis    High cholesterol    Postmenopausal osteoporosis    Recurrent urinary tract infection    Other allergic rhinitis    Palpitations (sees Cardiology--negative w/u)     Heart murmur (neg ECHO in 2021)     Stage 3a chronic kidney disease    Medicare annual wellness visit, subsequent    Urgency incontinence    Recurrent cold sores    Low serum iron    Intrinsic sphincter deficiency    History of colon polyps (last scope was in 2021)       Review of Systems  Review of Systems   All other systems reviewed and are negative.    As described in the HPI. Otherwise, remainder of ROS (14 systems) were negative.    Medications, Allergies, Social, and Family Histories reviewed as per EMR.    Result Review :       Data reviewed : Radiologic studies CT      Objective     Vitals:    02/26/24 1345   BP: 109/73   Pulse: 67   Resp: 16   Temp: 97.2 °F (36.2 °C)   SpO2: 95%         02/26/24  1345   Weight: 51.4 kg (113 lb 6.4 oz)       Physical Exam  Vitals and nursing note reviewed.   Constitutional:       Appearance: Normal appearance.   HENT:      Head: Normocephalic and atraumatic.      Nose: Nose normal.      Mouth/Throat:      Mouth: Mucous membranes are moist.      Pharynx: Oropharynx is clear.   Eyes:      Extraocular Movements: Extraocular movements intact.      Conjunctiva/sclera: Conjunctivae normal.      Pupils: Pupils  are equal, round, and reactive to light.   Cardiovascular:      Rate and Rhythm: Normal rate and regular rhythm.      Pulses: Normal pulses.      Heart sounds: Normal heart sounds.   Pulmonary:      Effort: Pulmonary effort is normal.      Breath sounds: Normal breath sounds.   Abdominal:      General: Abdomen is flat. Bowel sounds are normal.      Palpations: Abdomen is soft.   Musculoskeletal:         General: Normal range of motion.      Cervical back: Normal range of motion and neck supple.   Skin:     General: Skin is warm.      Capillary Refill: Capillary refill takes 2 to 3 seconds.   Neurological:      General: No focal deficit present.      Mental Status: She is alert and oriented to person, place, and time.   Psychiatric:         Mood and Affect: Mood normal.         Behavior: Behavior normal.         Mammo Diagnostic Digital Tomosynthesis Left With CAD    Result Date: 2/9/2024  1. Stable probably benign calcifications within the superior aspect of the left breast.  Recommend patient return for bilateral diagnostic mammogram in 6 months for re-evaluation of the left breast calcifications and for screening views of the right breast.  Findings and recommendations were discussed with the patient at the time of the exam.   RECOMMENDATION(S):  SHORT TERM FOLLOW-UP DIAGNOSTIC MAMMOGRAM BILATERAL BREASTS IN 6 MONTHS.   BIRADS:  DIAGNOSTIC CATEGORY 3--PROBABLY BENIGN FINDING.   BREAST COMPOSITION: Heterogeneously dense,which may obscure small masses.  PLEASE NOTE:  A NORMAL MAMMOGRAM DOES NOT EXCLUDE THE POSSIBILITY OF BREAST CANCER. ANY CLINICALLY SUSPICIOUS PALPABLE LUMP SHOULD BE BIOPSIED.      CHRISTINE FERNANDEZ MD       Electronically Signed and Approved By: CHRISTINE FERNANDEZ MD on 2/09/2024 at 10:18             CT Abdomen Pelvis Without Contrast    Result Date: 2/6/2024    CT scan of the abdomen and pelvis without contrast demonstrating 6 mm nodule in the posterior right lower lobe, unchanged.  Markedly  atrophic left kidney, unchanged.  Moderate right hydronephrosis is unchanged.  The right ureter is of normal caliber.  Similar findings are evident on prior studies.  Moderate sigmoid diverticulosis without diverticulitis.     ROMEO VERAS MD       Electronically Signed and Approved By: ROMEO VERAS MD on 2/06/2024 at 16:55             CT Chest With Contrast Diagnostic    Result Date: 1/30/2024     1. Stable right lower lobe pulmonary nodules measuring up to 6 mm.  Additional follow-up recommended to document stability through July of 2024 to determine 2 year stability.  2. Moderate right-sided hydronephrosis appears new as compared to prior study.  This is incompletely evaluated without excretory phase images.      TOMMY GARIBAY MD       Electronically Signed and Approved By: TOMMY GARIBAY MD on 1/30/2024 at 12:33               Assessment & Plan     Diagnoses and all orders for this visit:    1. Lung nodule (Primary)  -     CT Chest Low Dose Follow Up With Contrast; Future         Discussion/ Recommendations:   CT scan reviewed showing stable right lower lobe pulmonary nodules up to 6 mm  Will repeat CT scan low-dose for July for follow-up  Vaccinations discussed and recommended    BMI is within normal parameters. No other follow-up for BMI required.        Return in about 6 months (around 8/26/2024).          This document has been electronically signed by Johan De Jesus MD on February 26, 2024 13:50 EST

## 2024-02-26 NOTE — PROGRESS NOTES
Chief Complaint  Follow-up    Subjective        History of Present Illness  Dacia Napier presents to [facility] for follow up.  Patient is a 78-year-old female with past medical history outlined below, significant for hyperlipidemia and paroxysmal SVT who presents for routine follow-up.  She notes very rare palpitations.  She is more concerned with the episodes of dizziness that she experiences especially with position changes.  She denies syncope or presyncope.  She has no chest pain, dyspnea, edema or other complaints.  She used to be on a beta-blocker in the past but stopped due to its side effects.      Past Medical History:   Diagnosis Date    Anemia 06/2021    Iron prescription    Arrhythmia     Bronchitis     CHF (congestive heart failure)     Chronic kidney disease     Colon polyp     Diabetes mellitus     GERD (gastroesophageal reflux disease)     Hiatal hernia     Hyperlipidemia     Lung nodule 7/2022    On my ct scan at [facility]    Other allergic rhinitis 01/06/2022    Rapid or irregular heartbeat     Seasonal allergies     Type 2 diabetes mellitus     PRE DM       ALLERGY  No Known Allergies     Past Surgical History:   Procedure Laterality Date    CARDIAC CATHETERIZATION      CATARACT EXTRACTION, BILATERAL      COLONOSCOPY      COLONOSCOPY N/A 06/25/2021    POLYP    CYSTOSCOPY W/ BULKING AGENT INJECTION N/A 08/25/2023    Procedure: CYSTOSCOPY WITH BULKING AGENT INJECTION;  Surgeon: Chrissie Katz MD;  Location: [facility] MAIN OR;  Service: Urology;  Laterality: N/A;    ENDOSCOPY N/A 06/25/2021    Procedure: ESOPHAGOGASTRODUODENOSCOPY;  Surgeon: Ty Partida MD;  Location: [facility] ENDOSCOPY;  Service: General;  Laterality: N/A;  Hiatal Hernia    EYE CAPSULOTOMY WITH LASER      HERNIA REPAIR      HYSTERECTOMY      LASIK      TONSILLECTOMY          Social History     Socioeconomic History    Marital status:    Tobacco Use    Smoking status: Never     Passive  exposure: Past    Smokeless tobacco: Never   Vaping Use    Vaping Use: Never used   Substance and Sexual Activity    Alcohol use: Not Currently     Comment: Hardly ever    Drug use: Never    Sexual activity: Not Currently     Partners: Male     Birth control/protection: Condom, Pill, Diaphragm, Spermicide       Family History   Problem Relation Age of Onset    Heart disease Mother          of massive heart attach at age 66    Heart failure Mother         Congestive heart failure    Heart disease Father         Had 3 heart attacks and  on  at age 79    Heart failure Father         3 heart attacks in 6 years    Heart disease Sister         Sister with heart problems    Cancer Sister         Breast cancer    Heart disease Brother         Stents  of heart attack    Cancer Brother         Tongue cancer    Heart failure Brother         Pace maker defibrillator and stents at 40    Cancer Sister         Melanoma skin cancer    Heart disease Sister         Pace maker    Heart failure Brother         By pass    Heart failure Sister         5 by pass    Heart disease Sister     Heart disease Brother         Heart problems    Malig Hyperthermia Neg Hx         Current Outpatient Medications on File Prior to Visit   Medication Sig    Accu-Chek Softclix Lancets lancets Test glucose once each day and as needed    aspirin 81 MG EC tablet Take 1 tablet by mouth Daily. LAST DOSE 23 PT STOPPED ON OWN    Calcium Carbonate 1500 (600 Ca) MG tablet 2 (Two) Times a Day With Meals.    cetirizine (zyrTEC) 10 MG tablet Take 1 tablet by mouth Daily As Needed.    Cholecalciferol (Vitamin D3) 50 MCG (2000 UT) tablet Take 1 tablet by mouth Daily.    estradiol (ESTRACE) 0.1 MG/GM vaginal cream Insert 2 applicators into the vagina Daily.    ezetimibe (ZETIA) 10 MG tablet TAKE 1 TABLET BY MOUTH DAILY    glucose blood (Accu-Chek Guide) test strip TEST TWICE DAILY    Omega-3 1000 MG capsule Take 1 capsule by mouth Daily.     "omeprazole (priLOSEC) 20 MG capsule TAKE 1 CAPSULE BY MOUTH DAILY    rosuvastatin (CRESTOR) 10 MG tablet Take 1 tablet by mouth Every Night.    Tirzepatide (Mounjaro) 2.5 MG/0.5ML solution pen-injector pen Inject 0.5 mL under the skin into the appropriate area as directed 1 (One) Time Per Week.    valACYclovir (VALTREX) 500 MG tablet Take 1 tablet by mouth As Needed.     No current facility-administered medications on file prior to visit.       Objective   Vitals:    02/26/24 1247   BP: 98/65   BP Location: Left arm   Patient Position: Sitting   Cuff Size: Adult   Pulse: 68   Weight: 52.2 kg (115 lb)   Height: 157.5 cm (62\")       Physical Exam  Constitutional:       General: She is awake. She is not in acute distress.     Appearance: Normal appearance.   HENT:      Head: Normocephalic.      Nose: Nose normal. No congestion.   Eyes:      Extraocular Movements: Extraocular movements intact.      Conjunctiva/sclera: Conjunctivae normal.      Pupils: Pupils are equal, round, and reactive to light.   Neck:      Thyroid: No thyromegaly.      Vascular: No JVD.   Cardiovascular:      Rate and Rhythm: Normal rate and regular rhythm.      Chest Wall: PMI is not displaced.      Pulses: Normal pulses.      Heart sounds: Normal heart sounds, S1 normal and S2 normal. No murmur heard.     No friction rub. No gallop. No S3 or S4 sounds.   Pulmonary:      Effort: Pulmonary effort is normal.      Breath sounds: Normal breath sounds. No wheezing, rhonchi or rales.   Abdominal:      General: Bowel sounds are normal.      Palpations: Abdomen is soft.      Tenderness: There is no abdominal tenderness.   Musculoskeletal:      Cervical back: No tenderness.      Right lower leg: No edema.      Left lower leg: No edema.   Lymphadenopathy:      Cervical: No cervical adenopathy.   Skin:     General: Skin is warm and dry.      Capillary Refill: Capillary refill takes less than 2 seconds.      Coloration: Skin is not cyanotic.      Findings: No " petechiae or rash.      Nails: There is no clubbing.   Neurological:      Mental Status: She is alert.   Psychiatric:         Mood and Affect: Mood normal.         Behavior: Behavior is cooperative.           Result Review     The following data was reviewed by JANES Ibrahim on 02/26/24.      CMP          7/7/2023    11:30 12/28/2023    10:29 1/29/2024    13:57   CMP   Glucose 106  130     BUN 16  15     Creatinine 1.01  1.05  1.10    EGFR 57.5  54.5  51.5    Sodium 139  139     Potassium 4.6  4.6     Chloride 103  101     Calcium 9.5  10.2     Total Protein 6.6  6.8     Albumin 4.5  4.6     Globulin 2.1  2.2     Total Bilirubin 0.5  0.4     Alkaline Phosphatase 67  85     AST (SGOT) 19  17     ALT (SGPT) 14  14     Albumin/Globulin Ratio 2.1  2.1     BUN/Creatinine Ratio 15.8  14.3     Anion Gap 8.0  10.0       CBC w/diff          7/7/2023    11:30 12/28/2023    10:29   CBC w/Diff   WBC 7.11  6.71    RBC 4.73  4.78    Hemoglobin 14.0  14.0    Hematocrit 43.6  44.7    MCV 92.2  93.5    MCH 29.6  29.3    MCHC 32.1  31.3    RDW 12.5  12.6    Platelets 234  262    Neutrophil Rel % 68.9  69.9    Immature Granulocyte Rel % 0.3  0.1    Lymphocyte Rel % 21.1  19.2    Monocyte Rel % 7.5  8.2    Eosinophil Rel % 1.8  2.2    Basophil Rel % 0.4  0.4       Lipid Panel          7/17/2023    08:20 2/1/2024    10:35   Lipid Panel   Total Cholesterol 142  133    Triglycerides 130  107    HDL Cholesterol 45  58    VLDL Cholesterol 23  19    LDL Cholesterol  74  56    LDL/HDL Ratio 1.58  0.92        Results for orders placed during the hospital encounter of 02/01/24    Adult Transthoracic Echo Complete W/ Cont if Necessary Per Protocol    Interpretation Summary    Left ventricular systolic function is normal. Calculated left ventricular EF = 65.7%    Left ventricular diastolic function is consistent with (grade I) impaired relaxation and age.    There is mild bileaflet mitral valve prolapse present.  Mild mitral valve  regurgitation.    Mild aortic valve regurgitation.    Results for orders placed in visit on 01/04/24    Treadmill Stress Test    Interpretation Summary    Exercise treadmill stress test is negative for myocardial ischemia.    Excellent exercise tolerance noted.  Maximum workload achieved was 13 METS.    There was no chest pain at maximal exercise.  There were no ischemic EKG changes at peak heart rate.    There were no exercise-induced arrhythmias.              Procedures    Assessment & Plan  Diagnoses and all orders for this visit:    1. Palpitations (Primary)    2. Mixed dyslipidemia    3. Paroxysmal SVT (supraventricular tachycardia)    4. Autonomic orthostatic hypotension      1.  Patient's palpitations are very infrequent.  More likely related to paroxysmal SVT.  Given the infrequency at this point do not feel that she is a candidate for possible ablation.  If her symptoms worsen in frequency or duration would consider EP referral at that time.  2.  Recent lipid profile was noted and unremarkable.  Continue the Zetia.  3.  As above.  4.  Patient's blood pressure runs on the low side and she has episodes of dizziness with position changes.  Orthostatic blood pressures were checked in the office and went from lying down 98/63 with a heart rate of 66 to standing 110/70 with a heart rate of 76.  This really is not indicative of orthostatic hypotension.  She may just be hypotensive at baseline.  Recommend lifestyle modifications including compression socks, increase salt intake, increase water intake, decrease caffeine intake.  If this does not work may consider midodrine versus fludrocortisone at next follow-up visit.              Follow Up   Return in about 6 months (around 8/26/2024) for With .    Patient was given instructions and counseling regarding her condition or for health maintenance advice. Please see specific information pulled into the AVS if appropriate.     Didi Serrato,  APRN  02/26/24  13:00 EST    Dictated Utilizing Dragon Dictation

## 2024-02-27 RX ORDER — ESTRADIOL 0.1 MG/G
2 CREAM VAGINAL DAILY
Qty: 42.5 G | Refills: 3 | Status: SHIPPED | OUTPATIENT
Start: 2024-02-27

## 2024-02-29 RX ORDER — TIRZEPATIDE 2.5 MG/.5ML
INJECTION, SOLUTION SUBCUTANEOUS
Qty: 2 ML | OUTPATIENT
Start: 2024-02-29

## 2024-02-29 NOTE — TELEPHONE ENCOUNTER
From: Dacia Napier  To: Annabelle Buckley  Sent: 2/19/2024 11:28 AM EST  Subject: Mounjaro    I asked for a three anyi prescription for 2.5 Mounjaro thru home delivery [employer]. They said they got the prescription but the directions were vague and two sets of directions. They said they have sent a request to clarify directions and have not received. Could you please check and send directions

## 2024-03-24 ENCOUNTER — TELEMEDICINE (OUTPATIENT)
Dept: FAMILY MEDICINE CLINIC | Facility: TELEHEALTH | Age: 79
End: 2024-03-24
Payer: MEDICARE

## 2024-03-24 DIAGNOSIS — M43.6 STIFF NECK: Primary | ICD-10-CM

## 2024-03-24 RX ORDER — PREDNISONE 10 MG/1
TABLET ORAL DAILY
Qty: 21 EACH | Refills: 0 | Status: SHIPPED | OUTPATIENT
Start: 2024-03-24 | End: 2024-03-30

## 2024-03-24 NOTE — PROGRESS NOTES
Subjective   Chief Complaint   Patient presents with    Neck Pain       Dacia Napier is a 78 y.o. female.     History of Present Illness  Patient reports waking up yesterday with a crick in  the right side of her neck just at the base of the hairline.  Today she woke up and the stiffness is wors she has had this problem in the past and was prescribed prednisone.  She prefers not to take muscle relaxers as they make her feel loopy.  Neck Pain   This is a new problem. The current episode started yesterday. The problem has been gradually worsening. The pain is associated with a sleep position. The pain is present in the right side. The pain is mild. The symptoms are aggravated by position, twisting and bending. Stiffness is present All day. Pertinent negatives include no chest pain, fever, headaches, leg pain, numbness, pain with swallowing, paresis, photophobia, syncope, tingling, trouble swallowing, visual change, weakness or weight loss. She has tried nothing for the symptoms.        No Known Allergies    Past Medical History:   Diagnosis Date    Anemia 06/2021    Iron prescription    Arrhythmia     Bronchitis     CHF (congestive heart failure)     Chronic kidney disease     Colon polyp     Diabetes mellitus     GERD (gastroesophageal reflux disease)     Hiatal hernia     Hyperlipidemia     Lung nodule 7/2022    On my ct scan at [facility]    Other allergic rhinitis 01/06/2022    Rapid or irregular heartbeat     Seasonal allergies     Type 2 diabetes mellitus     PRE DM       Past Surgical History:   Procedure Laterality Date    CARDIAC CATHETERIZATION      CATARACT EXTRACTION, BILATERAL      COLONOSCOPY      COLONOSCOPY N/A 06/25/2021    POLYP    CYSTOSCOPY W/ BULKING AGENT INJECTION N/A 08/25/2023    Procedure: CYSTOSCOPY WITH BULKING AGENT INJECTION;  Surgeon: Chrissie Katz MD;  Location: [facility] OR;  Service: Urology;  Laterality: N/A;    ENDOSCOPY N/A 06/25/2021    Procedure:  ESOPHAGOGASTRODUODENOSCOPY;  Surgeon: Ty Partida MD;  Location: [facility] ENDOSCOPY;  Service: General;  Laterality: N/A;  Hiatal Hernia    EYE CAPSULOTOMY WITH LASER      HERNIA REPAIR      HYSTERECTOMY      LASIK      TONSILLECTOMY         Social History     Socioeconomic History    Marital status:    Tobacco Use    Smoking status: Never     Passive exposure: Past    Smokeless tobacco: Never   Vaping Use    Vaping status: Never Used   Substance and Sexual Activity    Alcohol use: Not Currently     Comment: Hardly ever    Drug use: Never    Sexual activity: Not Currently     Partners: Male     Birth control/protection: Condom, Pill, Diaphragm, Spermicide       Family History   Problem Relation Age of Onset    Heart disease Mother          of massive heart attach at age 66    Heart failure Mother         Congestive heart failure    Heart disease Father         Had 3 heart attacks and  on  at age 79    Heart failure Father         3 heart attacks in 6 years    Heart disease Sister         Sister with heart problems    Cancer Sister         Breast cancer    Heart disease Brother         Stents  of heart attack    Cancer Brother         Tongue cancer    Heart failure Brother         Pace maker defibrillator and stents at 40    Cancer Sister         Melanoma skin cancer    Heart disease Sister         Pace maker    Heart failure Brother         By pass    Heart failure Sister         5 by pass    Heart disease Sister     Heart disease Brother         Heart problems    Malig Hyperthermia Neg Hx          Current Outpatient Medications:     Accu-Chek Softclix Lancets lancets, Test glucose once each day and as needed, Disp: 100 each, Rfl: 3    aspirin 81 MG EC tablet, Take 1 tablet by mouth Daily. LAST DOSE 23 PT STOPPED ON OWN, Disp: , Rfl:     Calcium Carbonate 1500 (600 Ca) MG tablet, 2 (Two) Times a Day With Meals., Disp: , Rfl:     cetirizine (zyrTEC) 10 MG tablet, Take 1 tablet by mouth  Daily As Needed., Disp: , Rfl:     Cholecalciferol (Vitamin D3) 50 MCG (2000 UT) tablet, Take 1 tablet by mouth Daily., Disp: , Rfl:     Coenzyme Q10 (CO Q-10 PO), , Disp: , Rfl:     estradiol (ESTRACE) 0.1 MG/GM vaginal cream, Insert 2 applicators into the vagina Daily., Disp: 42.5 g, Rfl: 3    ezetimibe (ZETIA) 10 MG tablet, TAKE 1 TABLET BY MOUTH DAILY, Disp: 90 tablet, Rfl: 0    glucose blood (Accu-Chek Guide) test strip, TEST TWICE DAILY, Disp: 200 each, Rfl: 3    Iron, Ferrous Sulfate, 325 (65 Fe) MG tablet, Take 325 mg by mouth Daily., Disp: , Rfl:     Omega-3 1000 MG capsule, Take 1 capsule by mouth Daily., Disp: , Rfl:     omeprazole (priLOSEC) 20 MG capsule, TAKE 1 CAPSULE BY MOUTH DAILY, Disp: 90 capsule, Rfl: 3    predniSONE (DELTASONE) 10 MG (21) dose pack, Take  by mouth Daily for 6 days., Disp: 21 each, Rfl: 0    rosuvastatin (CRESTOR) 10 MG tablet, Take 1 tablet by mouth Every Night., Disp: 90 tablet, Rfl: 0    Tirzepatide (Mounjaro) 2.5 MG/0.5ML solution pen-injector pen, Inject  under the skin into the appropriate area as directed., Disp: , Rfl:     valACYclovir (VALTREX) 500 MG tablet, Take 1 tablet by mouth As Needed., Disp: , Rfl:       Review of Systems   Constitutional:  Positive for activity change. Negative for chills, diaphoresis, fatigue, fever and unexpected weight loss.   HENT:  Negative for trouble swallowing.    Eyes:  Negative for photophobia.   Cardiovascular:  Negative for chest pain and syncope.   Musculoskeletal:  Positive for neck pain and neck stiffness.   Neurological:  Negative for tingling, weakness, numbness and headache.        There were no vitals filed for this visit.    Objective   Physical Exam  Constitutional:       General: She is not in acute distress.     Appearance: Normal appearance. She is not ill-appearing, toxic-appearing or diaphoretic.   HENT:      Head: Normocephalic.      Mouth/Throat:      Lips: Pink.      Mouth: Mucous membranes are moist.   Neck:      Pulmonary:      Effort: Pulmonary effort is normal.   Musculoskeletal:      Cervical back: Pain with movement present. Decreased range of motion.   Neurological:      Mental Status: She is alert and oriented to person, place, and time.          Procedures     Assessment & Plan   Diagnoses and all orders for this visit:    1. Stiff neck (Primary)  -     predniSONE (DELTASONE) 10 MG (21) dose pack; Take  by mouth Daily for 6 days.  Dispense: 21 each; Refill: 0    You may do gentle massage, apply heating pad or warm compress to help loosen muscles on the neck.    If symptoms worsen or do not improve follow up with your PCP or visit your nearest Urgent Care Center or ER.        PLAN: Discussed dosing, side effects, recommended other symptomatic care.  Patient should follow up with primary care provider, Urgent Care or ER if symptoms worsen, fail to resolve or other symptoms need attention. Patient/family agree to the above.         JANES Infante     The use of a video visit has been reviewed with the patient and verbal informed consent has been obtained. Myself and Dacia Napier participated in this visit. The patient is located at [address] [place] [id number]. I am located in [place], [place]. Mychart and Zoom were utilized.        This visit was performed via Telehealth.  This patient has been instructed to follow-up with their primary care provider if their symptoms worsen or the treatment provided does not resolve their illness.

## 2024-03-24 NOTE — PATIENT INSTRUCTIONS
NOTIFICATION RETURN TO WORK / SCHOOL 
 
4/12/2021 5:04 PM 
 
Ms. Hermina Schaumann [address] [facility] [address] [id number] [id number] To Whom It May Concern: 
 
Hermina Schaumann is currently under the care of [address] [place] [address]. She will need to take time off work due to medical reasons from April 12, 2021 to May 2, 2021. If there are questions or concerns please have the patient contact our office. Sincerely, Vazquez Alexander MD 
 
                                
 
 You may do gentle massage, apply heating pad or warm compress to help loosen muscles on the neck.    If symptoms worsen or do not improve follow up with your PCP or visit your nearest Urgent Care Center or ER.

## 2024-03-28 NOTE — PROGRESS NOTES
Chief Complaint  Diabetes (Follow up, med mgt, A1c completed 3/8/24)    Referred By: Mata Love,*    Subjective          Dacia Napier presents to [facility] for diabetes medication management    History of Present Illness    Visit type:  follow-up  Diabetes type:  Type 2  Current diabetes status/concerns/issues:  She was last seen in July 2023.  She has had higher glucose levels recently due to taking prednisone for bursitis  Other health concerns:  She has had a lot of stress recently due to the loss of her  in October 2023; recent imaging shows hydronephrosis of the right kidney  Current Diabetes symptoms:    Polyuria: No   Polydipsia: No   Polyphagia: No   Blurred vision: No   Excessive fatigue: No  Known Diabetes complications:  Neuropathy: None; Location: N/A  Renal: Stage IIIa moderate (GFR = 45-59 mL/min)  Eyes: None; Location: N/A  Amputation/Wounds: None  GI: None  Cardiovascular: Hypercholesterolemia  ED: N/A  Other: None  Hypoglycemia:  None reported at this time  Hypoglycemia Symptoms:  No hypoglycemia at this time  Current diabetes treatment:   Mounjaro 2.5 mg once weekly     Blood glucose device:  Meter  Blood glucose monitoring frequency:  1  Blood glucose range/average:   staying below 120 generally; higher over the last coupld of days  Glucose Source: Device Reviewed  Diet:  Limits high carb/sweet foods, Avoids sugary drinks  Activity/Exercise:  None    Past Medical History:   Diagnosis Date    Anemia 06/2021    Iron prescription    Arrhythmia     Bronchitis     CHF (congestive heart failure)     Chronic kidney disease     Colon polyp     Diabetes mellitus     GERD (gastroesophageal reflux disease)     Hiatal hernia     Hyperlipidemia     Lung nodule 7/2022    On my ct scan at [facility]    Other allergic rhinitis 01/06/2022    Rapid or irregular heartbeat     Seasonal allergies     Type 2 diabetes mellitus     PRE DM     Past Surgical History:    Procedure Laterality Date    CARDIAC CATHETERIZATION      CATARACT EXTRACTION, BILATERAL      COLONOSCOPY      COLONOSCOPY N/A 2021    POLYP    CYSTOSCOPY W/ BULKING AGENT INJECTION N/A 2023    Procedure: CYSTOSCOPY WITH BULKING AGENT INJECTION;  Surgeon: Chrissie Katz MD;  Location: [facility] MAIN OR;  Service: Urology;  Laterality: N/A;    ENDOSCOPY N/A 2021    Procedure: ESOPHAGOGASTRODUODENOSCOPY;  Surgeon: Ty Partida MD;  Location: [facility] ENDOSCOPY;  Service: General;  Laterality: N/A;  Hiatal Hernia    EYE CAPSULOTOMY WITH LASER      HERNIA REPAIR      HYSTERECTOMY      LASIK      TONSILLECTOMY       Family History   Problem Relation Age of Onset    Heart disease Mother          of massive heart attach at age 66    Heart failure Mother         Congestive heart failure    Heart disease Father         Had 3 heart attacks and  on  at age 79    Heart failure Father         3 heart attacks in 6 years    Heart disease Sister         Sister with heart problems    Cancer Sister         Breast cancer    Heart disease Brother         Stents  of heart attack    Cancer Brother         Tongue cancer    Heart failure Brother         Pace maker defibrillator and stents at 40    Cancer Sister         Melanoma skin cancer    Heart disease Sister         Pace maker    Heart failure Brother         By pass    Heart failure Sister         5 by pass    Heart disease Sister     Heart disease Brother         Heart problems    Malig Hyperthermia Neg Hx      Social History     Socioeconomic History    Marital status:    Tobacco Use    Smoking status: Never     Passive exposure: Past    Smokeless tobacco: Never   Vaping Use    Vaping status: Never Used   Substance and Sexual Activity    Alcohol use: Not Currently     Comment: Hardly ever    Drug use: Never    Sexual activity: Not Currently     Partners: Male     Birth control/protection: Condom, Pill, Diaphragm, Spermicide     No Known  "Allergies    Current Outpatient Medications:     Accu-Chek Softclix Lancets lancets, Test glucose once each day and as needed, Disp: 100 each, Rfl: 3    aspirin 81 MG EC tablet, Take 1 tablet by mouth Daily. LAST DOSE 08/18/23 PT STOPPED ON OWN, Disp: , Rfl:     Calcium Carbonate 1500 (600 Ca) MG tablet, 2 (Two) Times a Day With Meals., Disp: , Rfl:     cetirizine (zyrTEC) 10 MG tablet, Take 1 tablet by mouth Daily As Needed., Disp: , Rfl:     Cholecalciferol (Vitamin D3) 50 MCG (2000 UT) tablet, Take 1 tablet by mouth Daily., Disp: , Rfl:     estradiol (ESTRACE) 0.1 MG/GM vaginal cream, Insert 2 applicators into the vagina Daily., Disp: 42.5 g, Rfl: 3    ezetimibe (ZETIA) 10 MG tablet, TAKE 1 TABLET BY MOUTH DAILY, Disp: 90 tablet, Rfl: 1    glucose blood (Accu-Chek Guide) test strip, TEST TWICE DAILY, Disp: 200 each, Rfl: 3    Iron, Ferrous Sulfate, 325 (65 Fe) MG tablet, Take 325 mg by mouth Daily., Disp: , Rfl:     Omega-3 1000 MG capsule, Take 1 capsule by mouth Daily., Disp: , Rfl:     omeprazole (priLOSEC) 20 MG capsule, TAKE 1 CAPSULE BY MOUTH DAILY, Disp: 90 capsule, Rfl: 3    rosuvastatin (CRESTOR) 10 MG tablet, Take 1 tablet by mouth Every Night., Disp: 90 tablet, Rfl: 0    Tirzepatide (Mounjaro) 2.5 MG/0.5ML solution pen-injector pen, Inject  under the skin into the appropriate area as directed., Disp: , Rfl:     valACYclovir (VALTREX) 500 MG tablet, Take 1 tablet by mouth As Needed., Disp: , Rfl:     Objective     Vitals:    04/01/24 1330   BP: 134/70   BP Location: Left arm   Patient Position: Sitting   Cuff Size: Adult   Pulse: 72   SpO2: 99%   Weight: 50.8 kg (112 lb)   Height: 157.5 cm (62\")   PainSc: 0-No pain     Body mass index is 20.49 kg/m².    Physical Exam  Constitutional:       Appearance: Normal appearance. She is normal weight.   HENT:      Head: Normocephalic and atraumatic.      Right Ear: External ear normal.      Left Ear: External ear normal.      Nose: Nose normal.   Eyes:      " Extraocular Movements: Extraocular movements intact.      Conjunctiva/sclera: Conjunctivae normal.   Pulmonary:      Effort: Pulmonary effort is normal.   Musculoskeletal:         General: Normal range of motion.      Cervical back: Normal range of motion.   Skin:     General: Skin is warm and dry.   Neurological:      General: No focal deficit present.      Mental Status: She is alert and oriented to person, place, and time. Mental status is at baseline.   Psychiatric:         Mood and Affect: Mood normal.         Behavior: Behavior normal.         Thought Content: Thought content normal.         Judgment: Judgment normal.             Result Review :   The following data was reviewed by: JANES Betancourt on 04/01/2024:    Most Recent A1C          2/1/2024    10:35   HGBA1C Most Recent   Hemoglobin A1C 5.90        A1C Last 3 Results          7/7/2023    14:57 2/1/2024    10:35   HGBA1C Last 3 Results   Hemoglobin A1C 5.5  5.90      A1c collected on 2/1/2024  is 5.9%, indicating Controlled Type II diabetes.  This result is up from the prior result of 5.5% collected on 7/7/23    Glucose   Date Value Ref Range Status   04/01/2024 107 (H) 70 - 99 mg/dL Final     Comment:     Serial Number: [id number]:  [id number]     Point of care glucose in the office today is within normal limits for nonfasting glucose    Creatinine   Date Value Ref Range Status   01/29/2024 1.10 0.60 - 1.30 mg/dL Final     Comment:     Serial Number: [id number]:  [id number]   12/28/2023 1.05 (H) 0.57 - 1.00 mg/dL Final     eGFR   Date Value Ref Range Status   01/29/2024 51.5 (L) >60.0 mL/min/1.73 Final   12/28/2023 54.5 (L) >60.0 mL/min/1.73 Final     Labs collected on 1/19/2024 show Stage IIIa moderate (GFR = 45-59 mL/min    Microalbumin, Urine   Date Value Ref Range Status   12/28/2023 <1.2 mg/dL Final   07/17/2023 2.1 mg/dL Final     Creatinine, Urine   Date Value Ref Range Status   12/28/2023 120.3 mg/dL Final   12/28/2023 101.3  mg/dL Final     Microalbumin/Creatinine Ratio   Date Value Ref Range Status   12/28/2023   Final     Comment:     Unable to calculate   07/17/2023 12.2 mg/g Final     Urine microalbuminuria collected on 12/28/2023.  Is negative for microalbuminuria    Total Cholesterol   Date Value Ref Range Status   02/01/2024 133 0 - 200 mg/dL Final   07/17/2023 142 0 - 200 mg/dL Final     Triglycerides   Date Value Ref Range Status   02/01/2024 107 0 - 150 mg/dL Final   07/17/2023 130 0 - 150 mg/dL Final     HDL Cholesterol   Date Value Ref Range Status   02/01/2024 58 40 - 60 mg/dL Final   07/17/2023 45 40 - 60 mg/dL Final     LDL Cholesterol    Date Value Ref Range Status   02/01/2024 56 0 - 100 mg/dL Final   07/17/2023 74 0 - 100 mg/dL Final     Lipid panel collected on 2/1/2024 shows normal lipid panel            Assessment: Patient's A1c remains well-controlled without problematic hypoglycemia.  She continues to only use the 2.5 mg dose of Mounjaro without difficulty.      Diagnoses and all orders for this visit:    1. Controlled type 2 diabetes mellitus with stage 3 chronic kidney disease, without long-term current use of insulin (Primary)    Other orders  -     POC Glucose        Plan: No changes were made to the treatment plan.  She will be scheduled for routine follow-up appointment.    The patient will monitor her blood glucose levels once a day.  If she develops problematic hyperglycemia or hypoglycemia or adverse drug reactions, she will contact the office for further instructions.        Follow Up     Return in about 6 months (around 10/1/2024) for Medication Management.    Patient was given instructions and counseling regarding her condition or for health maintenance advice. Please see specific information pulled into the AVS if appropriate.     Annabelle Buckley, APRN  04/01/2024      Dictated Utilizing Dragon Dictation.  Please note that portions of this note were completed with a voice recognition program.   Part of this note may be an electronic transcription/translation of spoken language to printed text using the Dragon Dictation System.

## 2024-03-30 DIAGNOSIS — E11.9 TYPE 2 DIABETES MELLITUS WITHOUT COMPLICATION, WITHOUT LONG-TERM CURRENT USE OF INSULIN: ICD-10-CM

## 2024-04-01 ENCOUNTER — OFFICE VISIT (OUTPATIENT)
Dept: DIABETES SERVICES | Facility: HOSPITAL | Age: 79
End: 2024-04-01
Payer: MEDICARE

## 2024-04-01 VITALS
DIASTOLIC BLOOD PRESSURE: 70 MMHG | WEIGHT: 112 LBS | HEART RATE: 72 BPM | BODY MASS INDEX: 20.61 KG/M2 | SYSTOLIC BLOOD PRESSURE: 134 MMHG | HEIGHT: 62 IN | OXYGEN SATURATION: 99 %

## 2024-04-01 DIAGNOSIS — K21.9 HIATAL HERNIA WITH GERD WITHOUT ESOPHAGITIS: ICD-10-CM

## 2024-04-01 DIAGNOSIS — E78.00 HIGH CHOLESTEROL: ICD-10-CM

## 2024-04-01 DIAGNOSIS — E11.22 CONTROLLED TYPE 2 DIABETES MELLITUS WITH STAGE 3 CHRONIC KIDNEY DISEASE, WITHOUT LONG-TERM CURRENT USE OF INSULIN: Primary | ICD-10-CM

## 2024-04-01 DIAGNOSIS — N18.30 CONTROLLED TYPE 2 DIABETES MELLITUS WITH STAGE 3 CHRONIC KIDNEY DISEASE, WITHOUT LONG-TERM CURRENT USE OF INSULIN: Primary | ICD-10-CM

## 2024-04-01 DIAGNOSIS — K44.9 HIATAL HERNIA WITH GERD WITHOUT ESOPHAGITIS: ICD-10-CM

## 2024-04-01 LAB — GLUCOSE BLDC GLUCOMTR-MCNC: 107 MG/DL (ref 70–99)

## 2024-04-01 PROCEDURE — 99213 OFFICE O/P EST LOW 20 MIN: CPT | Performed by: NURSE PRACTITIONER

## 2024-04-01 PROCEDURE — 82948 REAGENT STRIP/BLOOD GLUCOSE: CPT | Performed by: NURSE PRACTITIONER

## 2024-04-01 PROCEDURE — 1160F RVW MEDS BY RX/DR IN RCRD: CPT | Performed by: NURSE PRACTITIONER

## 2024-04-01 PROCEDURE — 1159F MED LIST DOCD IN RCRD: CPT | Performed by: NURSE PRACTITIONER

## 2024-04-01 PROCEDURE — G0463 HOSPITAL OUTPT CLINIC VISIT: HCPCS | Performed by: NURSE PRACTITIONER

## 2024-04-01 RX ORDER — ROSUVASTATIN CALCIUM 10 MG/1
10 TABLET, COATED ORAL NIGHTLY
Qty: 90 TABLET | Refills: 0 | Status: SHIPPED | OUTPATIENT
Start: 2024-04-01

## 2024-04-01 RX ORDER — OMEPRAZOLE 20 MG/1
20 CAPSULE, DELAYED RELEASE ORAL DAILY
Qty: 90 CAPSULE | Refills: 0 | Status: SHIPPED | OUTPATIENT
Start: 2024-04-01

## 2024-04-01 RX ORDER — EZETIMIBE 10 MG/1
10 TABLET ORAL DAILY
Qty: 90 TABLET | Refills: 1 | Status: SHIPPED | OUTPATIENT
Start: 2024-04-01

## 2024-04-01 RX ORDER — ESTRADIOL 0.1 MG/G
2 CREAM VAGINAL DAILY
Qty: 42.5 G | Refills: 3 | Status: SHIPPED | OUTPATIENT
Start: 2024-04-01 | End: 2024-04-05

## 2024-04-04 ENCOUNTER — TELEPHONE (OUTPATIENT)
Dept: FAMILY MEDICINE CLINIC | Age: 79
End: 2024-04-04
Payer: MEDICARE

## 2024-04-04 NOTE — TELEPHONE ENCOUNTER
Ask the patient if she is actually using it daily and if so, ask her to try using it twice weekly. If this does not work, we can go back to the daily dosing.

## 2024-04-04 NOTE — TELEPHONE ENCOUNTER
Andrew faxed a medical clarification request:   Estrace vaginal cream 0.1mg/ gm, 1/2 applicator full ( 2 grams) daily is a high dose.  Typically two or three times weekly is prescribed.

## 2024-04-05 RX ORDER — ESTRADIOL 0.1 MG/G
2 CREAM VAGINAL 2 TIMES WEEKLY
Qty: 42.5 G | Refills: 3 | Status: SHIPPED | OUTPATIENT
Start: 2024-04-08

## 2024-04-05 NOTE — TELEPHONE ENCOUNTER
Pt said she was told to use it daily at the beginning and then go to two to three times weekly and that is what she is doing.   Advised to use twice weekly and let us know if it doesn't work for her that way. Pharmacy informed of twice weekly use.

## 2024-04-30 DIAGNOSIS — E11.9 TYPE 2 DIABETES MELLITUS WITHOUT COMPLICATION, WITHOUT LONG-TERM CURRENT USE OF INSULIN: ICD-10-CM

## 2024-04-30 DIAGNOSIS — E11.22 CONTROLLED TYPE 2 DIABETES MELLITUS WITH STAGE 3 CHRONIC KIDNEY DISEASE, WITHOUT LONG-TERM CURRENT USE OF INSULIN: ICD-10-CM

## 2024-04-30 DIAGNOSIS — E78.00 HIGH CHOLESTEROL: ICD-10-CM

## 2024-04-30 DIAGNOSIS — N18.30 CONTROLLED TYPE 2 DIABETES MELLITUS WITH STAGE 3 CHRONIC KIDNEY DISEASE, WITHOUT LONG-TERM CURRENT USE OF INSULIN: ICD-10-CM

## 2024-04-30 RX ORDER — ROSUVASTATIN CALCIUM 10 MG/1
10 TABLET, COATED ORAL NIGHTLY
Qty: 90 TABLET | Refills: 0 | Status: SHIPPED | OUTPATIENT
Start: 2024-04-30

## 2024-04-30 RX ORDER — LANCETS
EACH MISCELLANEOUS
Qty: 100 EACH | Refills: 3 | Status: SHIPPED | OUTPATIENT
Start: 2024-04-30

## 2024-04-30 RX ORDER — BLOOD SUGAR DIAGNOSTIC
STRIP MISCELLANEOUS
Qty: 200 EACH | Refills: 3 | Status: SHIPPED | OUTPATIENT
Start: 2024-04-30

## 2024-05-09 DIAGNOSIS — E11.9 TYPE 2 DIABETES MELLITUS WITHOUT COMPLICATION, WITHOUT LONG-TERM CURRENT USE OF INSULIN: ICD-10-CM

## 2024-05-09 RX ORDER — LANCETS
EACH MISCELLANEOUS
Qty: 100 EACH | Refills: 3 | Status: SHIPPED | OUTPATIENT
Start: 2024-05-09

## 2024-05-15 ENCOUNTER — OFFICE VISIT (OUTPATIENT)
Dept: GASTROENTEROLOGY | Facility: CLINIC | Age: 79
End: 2024-05-15
Payer: MEDICARE

## 2024-05-15 VITALS
WEIGHT: 116 LBS | DIASTOLIC BLOOD PRESSURE: 61 MMHG | HEART RATE: 73 BPM | BODY MASS INDEX: 21.35 KG/M2 | HEIGHT: 62 IN | SYSTOLIC BLOOD PRESSURE: 115 MMHG

## 2024-05-15 DIAGNOSIS — Z86.010 HISTORY OF COLON POLYPS: Primary | ICD-10-CM

## 2024-05-15 DIAGNOSIS — K59.04 CHRONIC IDIOPATHIC CONSTIPATION: ICD-10-CM

## 2024-05-15 DIAGNOSIS — Z12.11 ENCOUNTER FOR SCREENING FOR MALIGNANT NEOPLASM OF COLON: ICD-10-CM

## 2024-05-15 DIAGNOSIS — R13.19 ESOPHAGEAL DYSPHAGIA: ICD-10-CM

## 2024-05-15 RX ORDER — ONDANSETRON 4 MG/1
TABLET, FILM COATED ORAL
Qty: 2 TABLET | Refills: 0 | Status: SHIPPED | OUTPATIENT
Start: 2024-05-15

## 2024-05-15 NOTE — PROGRESS NOTES
Patient Name: Dacia Napier   Visit Date: 05/15/2024   Patient ID: [id number]  Provider: JANES Diaz    Sex: female  Location:  Location Address:  Location Phone: [phone]  [place] [address] [phone]    YOB: 1945  Age: 78 y.o.   Primary Care Provider Mata Love MD      Referring Provider: Mata Love,*        Chief Complaint  Constipation (Pt states having a BM QOD, straining, having hard-small stool some days )    History of Present Illness  New pt states she had required colonoscopy q3 yrs d/t polyps ( in [place]), and concerned last colonoscopy was told 5 yrs.   Pt states she has always struggled with constipation, takes Colace if no BM for 2 days.   No blood in stool or black stool  No fam hx of colon cancer  Denies abd pain w eating, pt reports occasional issues with swallowing, feels food get stuck in throat, worse if skips Omeprazole. States if she takes regularly she does better with less episodes.     CT abdomen and pelvis without contrast 2/6/2024: Sigmoid diverticulosis without diverticulitis right hydronephrosis unchanged right ureter is of normal caliber similar findings on previous studies, patient with atrophic left kidney. Pt follows w urologist     EGD by Dr Partida 6/25/21: Medium size hiatal hernia, normal stomach-gastropathy, normal duodenum  Colonoscopy by Dr. Partida 6/25/2021: Good bowel prep, small polyp in the transverse colon removed-adenomatous, repeat colon in 5 years.    Past Medical History:   Diagnosis Date    Anemia 06/2021    Iron prescription    Arrhythmia     Bronchitis     CHF (congestive heart failure)     Chronic kidney disease     Colon polyp     Diabetes mellitus     Diverticulitis of colon     Esophageal varices     GERD (gastroesophageal reflux disease)     Hiatal hernia     Hyperlipidemia     Lung nodule 7/2022    On my ct scan at [facility]    Other allergic rhinitis 01/06/2022    Rapid or irregular  heartbeat     Seasonal allergies     Type 2 diabetes mellitus     PRE DM       Past Surgical History:   Procedure Laterality Date    CARDIAC CATHETERIZATION      CATARACT EXTRACTION, BILATERAL      COLONOSCOPY      COLONOSCOPY N/A 2021    POLYP    CYSTOSCOPY W/ BULKING AGENT INJECTION N/A 2023    Procedure: CYSTOSCOPY WITH BULKING AGENT INJECTION;  Surgeon: Chrissie Katz MD;  Location: [facility] MAIN OR;  Service: Urology;  Laterality: N/A;    ENDOSCOPY N/A 2021    Procedure: ESOPHAGOGASTRODUODENOSCOPY;  Surgeon: Ty Partida MD;  Location: [facility] ENDOSCOPY;  Service: General;  Laterality: N/A;  Hiatal Hernia    EYE CAPSULOTOMY WITH LASER      HERNIA REPAIR      HYSTERECTOMY      LASIK      TONSILLECTOMY         No Known Allergies    Family History   Problem Relation Age of Onset    Heart disease Mother          of massive heart attach at age 66    Heart failure Mother         Congestive heart failure    Heart disease Father         Had 3 heart attacks and  on  at age 79    Heart failure Father         3 heart attacks in 6 years    Heart disease Sister         Sister with heart problems    Cancer Sister         Breast cancer    Cancer Sister         Melanoma skin cancer    Heart disease Sister         Pace maker    Heart failure Sister         5 by pass    Heart disease Sister     Heart disease Brother         Stents  of heart attack    Cancer Brother         Tongue cancer    Heart failure Brother         Pace maker defibrillator and stents at 40    Heart failure Brother         By pass    Heart disease Brother         Heart problems    Malig Hyperthermia Neg Hx     Colon cancer Neg Hx         Social History     Tobacco Use    Smoking status: Never     Passive exposure: Past    Smokeless tobacco: Never   Vaping Use    Vaping status: Never Used   Substance Use Topics    Alcohol use: Not Currently     Comment: Hardly ever    Drug use: Never       Objective     Vital Signs:   BP  "115/61 (BP Location: Right arm, Patient Position: Sitting, Cuff Size: Adult)   Pulse 73   Ht 157.5 cm (62\")   Wt 52.6 kg (116 lb)   BMI 21.22 kg/m²       Physical Exam  Constitutional:       General: The patient is not in acute distress.     Appearance: Normal appearance.   HENT:      Head: Normocephalic and atraumatic.      Nose: Nose normal.   Pulmonary:      Effort: Pulmonary effort is normal. No respiratory distress.   Abdominal:      General: Abdomen is flat.      Palpations: Abdomen is soft. There is no mass.      Tenderness: There is no abdominal tenderness. There is no guarding.   Musculoskeletal:      Cervical back: Neck supple.      Right lower leg: No edema.      Left lower leg: No edema.   Skin:     General: Skin is warm and dry.   Neurological:      General: No focal deficit present.      Mental Status: The patient is alert and oriented to person, place, and time.      Gait: Gait normal.   Psychiatric:         Mood and Affect: Mood normal.         Speech: Speech normal.         Behavior: Behavior normal.         Thought Content: Thought content normal.     Result Review :   The following data was reviewed by: JANES Diaz on 05/15/2024:    CBC w/diff          7/7/2023    11:30 12/28/2023    10:29   CBC w/Diff   WBC 7.11  6.71    RBC 4.73  4.78    Hemoglobin 14.0  14.0    Hematocrit 43.6  44.7    MCV 92.2  93.5    MCH 29.6  29.3    MCHC 32.1  31.3    RDW 12.5  12.6    Platelets 234  262    Neutrophil Rel % 68.9  69.9    Immature Granulocyte Rel % 0.3  0.1    Lymphocyte Rel % 21.1  19.2    Monocyte Rel % 7.5  8.2    Eosinophil Rel % 1.8  2.2    Basophil Rel % 0.4  0.4      CMP          7/7/2023    11:30 12/28/2023    10:29 1/29/2024    13:57   CMP   Glucose 106  130     BUN 16  15     Creatinine 1.01  1.05  1.10    EGFR 57.5  54.5  51.5    Sodium 139  139     Potassium 4.6  4.6     Chloride 103  101     Calcium 9.5  10.2     Total Protein 6.6  6.8     Albumin 4.5  4.6     Globulin 2.1 "  2.2     Total Bilirubin 0.5  0.4     Alkaline Phosphatase 67  85     AST (SGOT) 19  17     ALT (SGPT) 14  14     Albumin/Globulin Ratio 2.1  2.1     BUN/Creatinine Ratio 15.8  14.3     Anion Gap 8.0  10.0                     Assessment and Plan    Diagnoses and all orders for this visit:    1. History of colon polyps (Primary)  -     Case Request; Standing  -     Case Request    2. Chronic idiopathic constipation  -     Case Request; Standing  -     Case Request    3. Esophageal dysphagia  -     Case Request; Standing  -     Case Request    4. Encounter for screening for malignant neoplasm of colon    Other orders  -     Implement Anesthesia orders day of procedure.; Standing  -     Follow Anesthesia Guidelines / Protocol; Future  -     Obtain Informed Consent; Future  -     Verify NPO; Standing  -     Verify bowel prep was successful; Standing  -     Give tap water enema if bowel prep was insufficient; Standing  -     Obtain Informed Consent; Standing  -     ondansetron (Zofran) 4 MG tablet; 1 po 1 hour before prep, may repeat in 4-6 hrs as needed for n/v  Dispense: 2 tablet; Refill: 0  -     polyethylene glycol (GoLYTELY) 236 g solution; Take per office instructions  Dispense: 4000 mL; Refill: 0              Follow Up   Return if symptoms worsen or fail to improve.  I discussed with patient that we do not have screening guidelines after age 75 and that colonoscopy discussion becomes individualized; I explained we generally stop screening at age 85, patient verbalized understanding and would like to proceed.    EGD/COLONOSCOPY Surgical Risk and Benefits: Possible risks/complications, benefits, and alternatives to surgical or invasive procedure have been explained to patient and/or legal guardian; risks include bleeding, infection, and perforation. Patient has been evaluated and can tolerate anesthesia and/or sedation. Risks, benefits, and alternatives to anesthesia and sedation have been explained to patient  and/or legal guardian.   Cardio:    Pulm:    Pt concerned about tolerating prep, states she has trouble with n/v from it and has had difficulty completing  Pre medicate w Zofran   2 d cl liquids  Miralax 7 days before -- d/w pt these are options she can take to ensure good prep. She may not require full 2 d. Of cl liquids.  Benefiber recommended daily, but hold week of scope   Hold Mounjaro 7 days before scope    Previous GI - DR Jeremy Ramirez in [place] [phone]    Patient was given instructions and counseling regarding her condition or for health maintenance advice. Please see specific information pulled into the AVS if appropriate.

## 2024-05-17 ENCOUNTER — PATIENT ROUNDING (BHMG ONLY) (OUTPATIENT)
Dept: GASTROENTEROLOGY | Facility: CLINIC | Age: 79
End: 2024-05-17
Payer: MEDICARE

## 2024-05-17 NOTE — PROGRESS NOTES
5/17/2024      Hello, may I speak with Dacia Napier     My name is Cecilio. I am calling from [facility]. I show that you had a recent visit with JANES Perry.    Before we get started may I verify your date of birth? 1945    I am calling to officially welcome you to our practice and ask about your recent visit. Is this a good time to talk? No I left pt a VM     Tell me about your visit with us. What things went well?    We strive to ensure that we protect your safety and privacy. Is there anything we could have done to improve this during your visit?        We're always looking for ways to make our patients' experiences even better. Do you have recommendations on ways we may improve?    Overall were you satisfied with your first visit to our practice?    I appreciate you taking the time to speak with me today. Is there anything else I can do for you?    I am glad to hear that you had a very good visit and I appreciate you taking the time to provide feedback on this call. We would greatly appreciate you filling out a survey if you receive one in the mail, email or text. This is a great opportunity to provide any additional feedback that you may think of after this call as well.       Thank you, and have a great day.

## 2024-06-07 ENCOUNTER — TELEPHONE (OUTPATIENT)
Dept: FAMILY MEDICINE CLINIC | Age: 79
End: 2024-06-07
Payer: MEDICARE

## 2024-06-07 DIAGNOSIS — Z12.31 SCREENING MAMMOGRAM FOR BREAST CANCER: Primary | ICD-10-CM

## 2024-06-07 NOTE — TELEPHONE ENCOUNTER
Mammo Diagnostic Digital Tomosynthesis Left With CAD (02/09/2024 10:07)   Mata Love MD  2/11/2024  7:59 AM EST       No change in the bits of calcium in the left breast. Please set a reminder to repeat a routine mammogram in six months.   Not due until Aug.

## 2024-06-07 NOTE — TELEPHONE ENCOUNTER
Caller: Dacia Napier    Relationship: Self    Best call back number: [phone]     What orders are you requesting (i.e. lab or imaging):   DIAGNOSTIC MAMMOGRAM TO FOLLOW UP    In what timeframe would the patient need to come in: 8.9.2024    Where will you receive your lab/imaging services: [facility]     Additional notes: PATIENT REQUESTING REFERRAL BE PUT IN AND SENT OVER TO SCHEDULE.        MYCHART YES OR CALL MAY LEAVE VOICEMAIL.

## 2024-06-12 RX ORDER — CETIRIZINE HYDROCHLORIDE 10 MG/1
10 TABLET ORAL DAILY PRN
Qty: 90 TABLET | Refills: 3 | Status: SHIPPED | OUTPATIENT
Start: 2024-06-12

## 2024-06-12 NOTE — TELEPHONE ENCOUNTER
Rx Refill Note  Requested Prescriptions     Pending Prescriptions Disp Refills    cetirizine (zyrTEC) 10 MG tablet       Sig: Take 1 tablet by mouth Daily As Needed.      Last office visit with prescribing clinician: 1/8/2024   Last telemedicine visit with prescribing clinician: Visit date not found   Next office visit with prescribing clinician: 6/12/2024      Taylor Adams LPN  06/12/24, 12:22 EDT

## 2024-06-12 NOTE — TELEPHONE ENCOUNTER
02/09/2024 Mammogram:  IMPRESSION:  1. Stable probably benign calcifications within the superior aspect of the left breast.     Recommend patient return for bilateral diagnostic mammogram in 6 months for re-evaluation of the   left breast calcifications and for screening views of the right breast.  Findings and   recommendations were discussed with the patient at the time of the exam.       RECOMMENDATION(S):               SHORT TERM FOLLOW-UP DIAGNOSTIC MAMMOGRAM BILATERAL BREASTS IN 6   MONTHS.      The next one should be a bilateral diagnostic mammogram due in Aug.   Pt informed it would be due in Aug.     Do you want us to set a reminder for Aug?

## 2024-06-13 ENCOUNTER — PATIENT MESSAGE (OUTPATIENT)
Dept: DIABETES SERVICES | Facility: HOSPITAL | Age: 79
End: 2024-06-13
Payer: MEDICARE

## 2024-06-14 RX ORDER — TIRZEPATIDE 2.5 MG/.5ML
INJECTION, SOLUTION SUBCUTANEOUS
Qty: 6 ML | Refills: 1 | Status: SHIPPED | OUTPATIENT
Start: 2024-06-14

## 2024-06-23 DIAGNOSIS — K44.9 HIATAL HERNIA WITH GERD WITHOUT ESOPHAGITIS: ICD-10-CM

## 2024-06-23 DIAGNOSIS — K21.9 HIATAL HERNIA WITH GERD WITHOUT ESOPHAGITIS: ICD-10-CM

## 2024-06-24 ENCOUNTER — PATIENT MESSAGE (OUTPATIENT)
Dept: FAMILY MEDICINE CLINIC | Age: 79
End: 2024-06-24
Payer: MEDICARE

## 2024-06-24 ENCOUNTER — TELEPHONE (OUTPATIENT)
Dept: GASTROENTEROLOGY | Facility: CLINIC | Age: 79
End: 2024-06-24
Payer: MEDICARE

## 2024-06-24 DIAGNOSIS — N18.30 CONTROLLED TYPE 2 DIABETES MELLITUS WITH STAGE 3 CHRONIC KIDNEY DISEASE, WITHOUT LONG-TERM CURRENT USE OF INSULIN: Primary | ICD-10-CM

## 2024-06-24 DIAGNOSIS — E11.22 CONTROLLED TYPE 2 DIABETES MELLITUS WITH STAGE 3 CHRONIC KIDNEY DISEASE, WITHOUT LONG-TERM CURRENT USE OF INSULIN: Primary | ICD-10-CM

## 2024-06-24 DIAGNOSIS — R92.8 ABNORMAL MAMMOGRAM: Primary | ICD-10-CM

## 2024-06-24 RX ORDER — OMEPRAZOLE 20 MG/1
20 CAPSULE, DELAYED RELEASE ORAL DAILY
Qty: 90 CAPSULE | Refills: 1 | Status: SHIPPED | OUTPATIENT
Start: 2024-06-24

## 2024-06-24 NOTE — TELEPHONE ENCOUNTER
Hub staff attempted to follow warm transfer process and was unsuccessful     Caller: Dacia Napier    Relationship to patient: Self    Best call back number: [phone]    Patient is needing: PATIENT NEEDS TO RESCHEDULE HER PROCEDURE ON 7-11-24 DUE TO NO TRANSPORTATION

## 2024-06-25 ENCOUNTER — DOCUMENTATION (OUTPATIENT)
Dept: FAMILY MEDICINE CLINIC | Age: 79
End: 2024-06-25
Payer: MEDICARE

## 2024-06-25 ENCOUNTER — TRANSCRIBE ORDERS (OUTPATIENT)
Dept: LAB | Facility: HOSPITAL | Age: 79
End: 2024-06-25
Payer: MEDICARE

## 2024-06-25 ENCOUNTER — LAB (OUTPATIENT)
Dept: LAB | Facility: HOSPITAL | Age: 79
End: 2024-06-25
Payer: MEDICARE

## 2024-06-25 DIAGNOSIS — E11.22 CONTROLLED TYPE 2 DIABETES MELLITUS WITH STAGE 3 CHRONIC KIDNEY DISEASE, WITHOUT LONG-TERM CURRENT USE OF INSULIN: ICD-10-CM

## 2024-06-25 DIAGNOSIS — I10 HYPERTENSION, ESSENTIAL: ICD-10-CM

## 2024-06-25 DIAGNOSIS — E11.9 TYPE 2 DIABETES MELLITUS WITHOUT COMPLICATION, WITHOUT LONG-TERM CURRENT USE OF INSULIN: ICD-10-CM

## 2024-06-25 DIAGNOSIS — N18.30 CONTROLLED TYPE 2 DIABETES MELLITUS WITH STAGE 3 CHRONIC KIDNEY DISEASE, WITHOUT LONG-TERM CURRENT USE OF INSULIN: ICD-10-CM

## 2024-06-25 DIAGNOSIS — E61.1 LOW SERUM IRON: Primary | ICD-10-CM

## 2024-06-25 DIAGNOSIS — R92.8 ABNORMAL MAMMOGRAM: Primary | ICD-10-CM

## 2024-06-25 DIAGNOSIS — E61.1 LOW SERUM IRON: ICD-10-CM

## 2024-06-25 DIAGNOSIS — E78.00 HIGH CHOLESTEROL: ICD-10-CM

## 2024-06-25 DIAGNOSIS — I10 HYPERTENSION, ESSENTIAL: Primary | ICD-10-CM

## 2024-06-25 LAB
ALBUMIN SERPL-MCNC: 4.2 G/DL (ref 3.5–5.2)
ALBUMIN UR-MCNC: <1.2 MG/DL
ALBUMIN/GLOB SERPL: 1.8 G/DL
ALP SERPL-CCNC: 118 U/L (ref 39–117)
ALT SERPL W P-5'-P-CCNC: 15 U/L (ref 1–33)
ANION GAP SERPL CALCULATED.3IONS-SCNC: 9.7 MMOL/L (ref 5–15)
AST SERPL-CCNC: 25 U/L (ref 1–32)
BACTERIA UR QL AUTO: NORMAL /HPF
BASOPHILS # BLD AUTO: 0.02 10*3/MM3 (ref 0–0.2)
BASOPHILS NFR BLD AUTO: 0.3 % (ref 0–1.5)
BILIRUB SERPL-MCNC: 0.6 MG/DL (ref 0–1.2)
BILIRUB UR QL STRIP: NEGATIVE
BUN SERPL-MCNC: 14 MG/DL (ref 8–23)
BUN/CREAT SERPL: 13.9 (ref 7–25)
CALCIUM SPEC-SCNC: 9.4 MG/DL (ref 8.6–10.5)
CHLORIDE SERPL-SCNC: 103 MMOL/L (ref 98–107)
CHOLEST SERPL-MCNC: 145 MG/DL (ref 0–200)
CLARITY UR: CLEAR
CO2 SERPL-SCNC: 26.3 MMOL/L (ref 22–29)
COLOR UR: YELLOW
CREAT SERPL-MCNC: 1.01 MG/DL (ref 0.57–1)
CREAT UR-MCNC: 103.1 MG/DL
CREAT UR-MCNC: 96 MG/DL
DEPRECATED RDW RBC AUTO: 43.9 FL (ref 37–54)
EGFRCR SERPLBLD CKD-EPI 2021: 57.1 ML/MIN/1.73
EOSINOPHIL # BLD AUTO: 0.06 10*3/MM3 (ref 0–0.4)
EOSINOPHIL NFR BLD AUTO: 0.9 % (ref 0.3–6.2)
ERYTHROCYTE [DISTWIDTH] IN BLOOD BY AUTOMATED COUNT: 12.7 % (ref 12.3–15.4)
GLOBULIN UR ELPH-MCNC: 2.4 GM/DL
GLUCOSE SERPL-MCNC: 100 MG/DL (ref 65–99)
GLUCOSE UR STRIP-MCNC: NEGATIVE MG/DL
HBA1C MFR BLD: 6.1 % (ref 4.8–5.6)
HCT VFR BLD AUTO: 42.2 % (ref 34–46.6)
HDLC SERPL-MCNC: 50 MG/DL (ref 40–60)
HGB BLD-MCNC: 13.1 G/DL (ref 12–15.9)
HGB UR QL STRIP.AUTO: ABNORMAL
IMM GRANULOCYTES # BLD AUTO: 0.02 10*3/MM3 (ref 0–0.05)
IMM GRANULOCYTES NFR BLD AUTO: 0.3 % (ref 0–0.5)
IRON 24H UR-MRATE: 93 MCG/DL (ref 37–145)
IRON SATN MFR SERPL: 24 % (ref 20–50)
KETONES UR QL STRIP: NEGATIVE
LDLC SERPL CALC-MCNC: 75 MG/DL (ref 0–100)
LDLC/HDLC SERPL: 1.46 {RATIO}
LEUKOCYTE ESTERASE UR QL STRIP.AUTO: NEGATIVE
LYMPHOCYTES # BLD AUTO: 1.22 10*3/MM3 (ref 0.7–3.1)
LYMPHOCYTES NFR BLD AUTO: 19.2 % (ref 19.6–45.3)
MCH RBC QN AUTO: 29 PG (ref 26.6–33)
MCHC RBC AUTO-ENTMCNC: 31 G/DL (ref 31.5–35.7)
MCV RBC AUTO: 93.4 FL (ref 79–97)
MICROALBUMIN/CREAT UR: NORMAL MG/G{CREAT}
MONOCYTES # BLD AUTO: 0.58 10*3/MM3 (ref 0.1–0.9)
MONOCYTES NFR BLD AUTO: 9.1 % (ref 5–12)
NEUTROPHILS NFR BLD AUTO: 4.45 10*3/MM3 (ref 1.7–7)
NEUTROPHILS NFR BLD AUTO: 70.2 % (ref 42.7–76)
NITRITE UR QL STRIP: NEGATIVE
PH UR STRIP.AUTO: 6 [PH] (ref 5–8)
PLATELET # BLD AUTO: 216 10*3/MM3 (ref 140–450)
PMV BLD AUTO: 10.2 FL (ref 6–12)
POTASSIUM SERPL-SCNC: 4.5 MMOL/L (ref 3.5–5.2)
PROT ?TM UR-MCNC: 9.4 MG/DL
PROT SERPL-MCNC: 6.6 G/DL (ref 6–8.5)
PROT UR QL STRIP: NEGATIVE
PROT/CREAT UR: 0.09 MG/G{CREAT}
RBC # BLD AUTO: 4.52 10*6/MM3 (ref 3.77–5.28)
RBC # UR STRIP: NORMAL /HPF
REF LAB TEST METHOD: NORMAL
SODIUM SERPL-SCNC: 139 MMOL/L (ref 136–145)
SP GR UR STRIP: 1.02 (ref 1–1.03)
SQUAMOUS #/AREA URNS HPF: NORMAL /HPF
TIBC SERPL-MCNC: 390 MCG/DL (ref 298–536)
TRANSFERRIN SERPL-MCNC: 262 MG/DL (ref 200–360)
TRIGL SERPL-MCNC: 109 MG/DL (ref 0–150)
UROBILINOGEN UR QL STRIP: ABNORMAL
VLDLC SERPL-MCNC: 20 MG/DL (ref 5–40)
WBC # UR STRIP: NORMAL /HPF
WBC NRBC COR # BLD AUTO: 6.35 10*3/MM3 (ref 3.4–10.8)

## 2024-06-25 PROCEDURE — 82570 ASSAY OF URINE CREATININE: CPT

## 2024-06-25 PROCEDURE — 84156 ASSAY OF PROTEIN URINE: CPT

## 2024-06-25 PROCEDURE — 84466 ASSAY OF TRANSFERRIN: CPT

## 2024-06-25 PROCEDURE — 36415 COLL VENOUS BLD VENIPUNCTURE: CPT

## 2024-06-25 PROCEDURE — 80053 COMPREHEN METABOLIC PANEL: CPT

## 2024-06-25 PROCEDURE — 83540 ASSAY OF IRON: CPT

## 2024-06-25 PROCEDURE — 81001 URINALYSIS AUTO W/SCOPE: CPT

## 2024-06-25 PROCEDURE — 83036 HEMOGLOBIN GLYCOSYLATED A1C: CPT

## 2024-06-25 PROCEDURE — 80061 LIPID PANEL: CPT

## 2024-06-25 PROCEDURE — 82043 UR ALBUMIN QUANTITATIVE: CPT

## 2024-06-25 PROCEDURE — 85025 COMPLETE CBC W/AUTO DIFF WBC: CPT

## 2024-06-25 NOTE — TELEPHONE ENCOUNTER
Per radiology it was recommended pt have 6 month follow up of diagnostic srinivas mammogram for reevaluation of left breast calcifications

## 2024-06-25 NOTE — TELEPHONE ENCOUNTER
Discussed condition and exacerbating conditions/situations (e.g., dry/arid environments, overhead fans, air conditioners, side effect of medications). Per pt radiologist recommended srinivas diagnostic mammo, please advise.

## 2024-06-26 ENCOUNTER — PATIENT MESSAGE (OUTPATIENT)
Dept: DIABETES SERVICES | Facility: HOSPITAL | Age: 79
End: 2024-06-26
Payer: MEDICARE

## 2024-07-11 ENCOUNTER — APPOINTMENT (OUTPATIENT)
Dept: CT IMAGING | Facility: HOSPITAL | Age: 79
End: 2024-07-11
Payer: MEDICARE

## 2024-07-12 ENCOUNTER — HOSPITAL ENCOUNTER (OUTPATIENT)
Dept: CT IMAGING | Facility: HOSPITAL | Age: 79
Discharge: HOME | End: 2024-07-12
Payer: MEDICARE

## 2024-07-12 DIAGNOSIS — R91.1 LUNG NODULE: ICD-10-CM

## 2024-07-12 PROCEDURE — 71271 CT THORAX LUNG CANCER SCR C-: CPT

## 2024-07-29 ENCOUNTER — OFFICE VISIT (OUTPATIENT)
Dept: FAMILY MEDICINE CLINIC | Age: 79
End: 2024-07-29
Payer: MEDICARE

## 2024-07-29 VITALS
SYSTOLIC BLOOD PRESSURE: 104 MMHG | TEMPERATURE: 98 F | WEIGHT: 113 LBS | HEART RATE: 91 BPM | BODY MASS INDEX: 20.8 KG/M2 | DIASTOLIC BLOOD PRESSURE: 65 MMHG | HEIGHT: 62 IN

## 2024-07-29 DIAGNOSIS — E78.00 HIGH CHOLESTEROL: Primary | ICD-10-CM

## 2024-07-29 DIAGNOSIS — R00.2 PALPITATIONS: ICD-10-CM

## 2024-07-29 DIAGNOSIS — K21.9 HIATAL HERNIA WITH GERD WITHOUT ESOPHAGITIS: ICD-10-CM

## 2024-07-29 DIAGNOSIS — E11.9 TYPE 2 DIABETES MELLITUS WITHOUT COMPLICATION, WITHOUT LONG-TERM CURRENT USE OF INSULIN: ICD-10-CM

## 2024-07-29 DIAGNOSIS — K44.9 HIATAL HERNIA WITH GERD WITHOUT ESOPHAGITIS: ICD-10-CM

## 2024-07-29 PROBLEM — Z00.00 MEDICARE ANNUAL WELLNESS VISIT, SUBSEQUENT: Status: RESOLVED | Noted: 2022-07-14 | Resolved: 2024-07-29

## 2024-07-29 PROCEDURE — 1160F RVW MEDS BY RX/DR IN RCRD: CPT | Performed by: FAMILY MEDICINE

## 2024-07-29 PROCEDURE — 1126F AMNT PAIN NOTED NONE PRSNT: CPT | Performed by: FAMILY MEDICINE

## 2024-07-29 PROCEDURE — 99214 OFFICE O/P EST MOD 30 MIN: CPT | Performed by: FAMILY MEDICINE

## 2024-07-29 PROCEDURE — 1159F MED LIST DOCD IN RCRD: CPT | Performed by: FAMILY MEDICINE

## 2024-07-29 PROCEDURE — G2211 COMPLEX E/M VISIT ADD ON: HCPCS | Performed by: FAMILY MEDICINE

## 2024-07-29 NOTE — PROGRESS NOTES
Chief Complaint  Diabetes (6 months)    Subjective          Dacia Napier presents to [facility]  History of Present Illness  --LAST HGA1C WAS 6.1 % WITH CURRENT MEDS  --LIPIDS WERE OK BUT SHE WANTS TO TRY HOLDING THE STATIN AND ZETIA AS SHE IS NOW TAKING AN OTC SUPPLEMENT TO LOWER HER CHOLESTEROL  --GERD IS WELL CONTROLLED WITH THE PPI        No Known Allergies     Health Maintenance Due   Topic Date Due    COVID-19 Vaccine (2 - 2023-24 season) 09/01/2023        Current Outpatient Medications on File Prior to Visit   Medication Sig    Accu-Chek Softclix Lancets lancets USE 1 LANCET TWICE DAILY    aspirin 81 MG EC tablet Take 1 tablet by mouth Daily. LAST DOSE 08/18/23 PT STOPPED ON OWN    Calcium Carbonate 1500 (600 Ca) MG tablet 2 (Two) Times a Day With Meals.    cetirizine (zyrTEC) 10 MG tablet Take 1 tablet by mouth Daily As Needed for Allergies.    Cholecalciferol (Vitamin D3) 50 MCG (2000 UT) tablet Take 1 tablet by mouth Daily.    Collagen-Boron-Hyaluronic Acid (MOVE FREE Savision PO) Take  by mouth.    estradiol (ESTRACE) 0.1 MG/GM vaginal cream Insert 2 g into the vagina 2 (Two) Times a Week.    ezetimibe (ZETIA) 10 MG tablet TAKE 1 TABLET BY MOUTH DAILY    glucose blood (Accu-Chek Guide) test strip TEST TWICE DAILY    Omega-3 1000 MG capsule Take 1 capsule by mouth Daily.    omeprazole (priLOSEC) 20 MG capsule Take 1 capsule by mouth Daily.    ondansetron (Zofran) 4 MG tablet 1 po 1 hour before prep, may repeat in 4-6 hrs as needed for n/v    polyethylene glycol (GoLYTELY) 236 g solution Take per office instructions    rosuvastatin (CRESTOR) 10 MG tablet Take 1 tablet by mouth Every Night.    Tirzepatide (Mounjaro) 2.5 MG/0.5ML solution pen-injector pen Inject 0.5 mL under the skin into the appropriate area as directed 1 (One) Time Per Week.    valACYclovir (VALTREX) 500 MG tablet Take 1 tablet by mouth As Needed.     No current facility-administered medications  "on file prior to visit.       Immunization History   Administered Date(s) Administered    COVID-19 (CHARLINE) 03/24/2021    Fluzone High Dose =>65 Years ([facility] ONLY) 10/24/2017, 10/16/2019, 11/28/2023    Fluzone High-Dose 65+yrs 10/24/2017, 10/16/2019, 10/04/2020, 10/22/2021, 10/04/2022, 11/28/2023    Hep B, Adolescent or Pediatric 05/01/2019, 02/01/2020    Hepatitis B 05/01/2019, 02/01/2020    Hepatitis B Adult/Adolescent IM 05/01/2019, 02/01/2020    Pneumococcal Conjugate 13-Valent (PCV13) 08/20/2019    Pneumococcal Conjugate 20-Valent (PCV20) 08/24/2022    Pneumococcal Polysaccharide (PPSV23) 02/13/2016    Shingrix 08/15/2019, 02/12/2020    Tdap 10/01/2012    Zostavax 02/12/2020       Review of Systems   Constitutional:  Negative for activity change, appetite change, chills, fatigue and fever.   HENT:  Negative for congestion, ear pain, rhinorrhea and sore throat.    Respiratory:  Negative for cough and shortness of breath.    Cardiovascular:  Negative for chest pain, palpitations and leg swelling.   Gastrointestinal:  Negative for abdominal pain, constipation, diarrhea, nausea and vomiting.   Musculoskeletal:  Negative for arthralgias and myalgias.   Neurological:  Negative for headache.        Objective     /65 (BP Location: Left arm, Patient Position: Sitting)   Pulse 91   Temp 98 °F (36.7 °C) (Oral)   Ht 157.5 cm (62\")   Wt 51.3 kg (113 lb)   BMI 20.67 kg/m²       Physical Exam  Vitals and nursing note reviewed.   Constitutional:       General: She is not in acute distress.     Appearance: Normal appearance.   Cardiovascular:      Rate and Rhythm: Normal rate and regular rhythm.      Heart sounds: Normal heart sounds. No murmur heard.  Pulmonary:      Effort: Pulmonary effort is normal.      Breath sounds: Normal breath sounds.   Abdominal:      Palpations: Abdomen is soft.      Tenderness: There is no abdominal tenderness.   Musculoskeletal:      Cervical back: Neck supple.      Right lower leg: " No edema.      Left lower leg: No edema.   Lymphadenopathy:      Cervical: No cervical adenopathy.   Neurological:      General: No focal deficit present.      Mental Status: She is alert.      Cranial Nerves: No cranial nerve deficit.      Coordination: Coordination normal.      Gait: Gait normal.   Psychiatric:         Mood and Affect: Mood normal.         Behavior: Behavior normal.         Result Review :                             Assessment and Plan      Diagnoses and all orders for this visit:    1. High cholesterol (Primary)  Assessment & Plan:   Lipid abnormalities are stable    Plan:  Continue same medication/s without change.      Discussed medication dosage, use, side effects, and goals of treatment in detail.    Counseled patient on lifestyle modifications to help control hyperlipidemia.     Patient Treatment Goals:   LDL goal is less than 70    Followup in 6 months  SHE WILL HOLD THE STATIN AS DISCUSSED AND REPEAT LABS IN TWO MONTHS      Orders:  -     Lipid Panel; Future    2. Hiatal hernia with GERD without esophagitis  Assessment & Plan:  IMPROVED WITH CURRENT TREATMENT, CONTINUE SAME, WILL REEVALUATE AT NEXT VISIT       3. Type 2 diabetes mellitus without complication, without long-term current use of insulin  Assessment & Plan:  Diabetes is stable.   Continue current treatment regimen.  Diabetes will be reassessed in 6 months    Orders:  -     Hemoglobin A1c; Future  -     Comprehensive Metabolic Panel; Future    4. Palpitations (sees Cardiology--negative w/u)   -     TSH Rfx On Abnormal To Free T4; Future            Follow Up     Return in about 6 months (around 1/29/2025).    Patient was given instructions and counseling regarding her condition or for health maintenance advice. Please see specific information pulled into the AVS if appropriate.

## 2024-07-29 NOTE — ASSESSMENT & PLAN NOTE
Lipid abnormalities are stable    Plan:  Continue same medication/s without change.      Discussed medication dosage, use, side effects, and goals of treatment in detail.    Counseled patient on lifestyle modifications to help control hyperlipidemia.     Patient Treatment Goals:   LDL goal is less than 70    Followup in 6 months  SHE WILL HOLD THE STATIN AS DISCUSSED AND REPEAT LABS IN TWO MONTHS

## 2024-08-12 ENCOUNTER — HOSPITAL ENCOUNTER (OUTPATIENT)
Dept: MAMMOGRAPHY | Facility: HOSPITAL | Age: 79
Discharge: HOME | End: 2024-08-12
Admitting: FAMILY MEDICINE
Payer: MEDICARE

## 2024-08-12 DIAGNOSIS — R92.8 ABNORMAL MAMMOGRAM: ICD-10-CM

## 2024-08-12 PROCEDURE — G0279 TOMOSYNTHESIS, MAMMO: HCPCS

## 2024-08-12 PROCEDURE — 77066 DX MAMMO INCL CAD BI: CPT

## 2024-08-17 DIAGNOSIS — E78.00 HIGH CHOLESTEROL: ICD-10-CM

## 2024-08-19 RX ORDER — ROSUVASTATIN CALCIUM 10 MG/1
10 TABLET, COATED ORAL
Qty: 90 TABLET | Refills: 1 | Status: SHIPPED | OUTPATIENT
Start: 2024-08-19

## 2024-08-26 ENCOUNTER — OFFICE VISIT (OUTPATIENT)
Dept: PULMONOLOGY | Facility: CLINIC | Age: 79
End: 2024-08-26
Payer: MEDICARE

## 2024-08-26 ENCOUNTER — OFFICE VISIT (OUTPATIENT)
Dept: CARDIOLOGY | Facility: CLINIC | Age: 79
End: 2024-08-26
Payer: MEDICARE

## 2024-08-26 VITALS
HEIGHT: 62 IN | DIASTOLIC BLOOD PRESSURE: 74 MMHG | HEART RATE: 77 BPM | BODY MASS INDEX: 20.87 KG/M2 | TEMPERATURE: 98 F | OXYGEN SATURATION: 97 % | WEIGHT: 113.4 LBS | SYSTOLIC BLOOD PRESSURE: 114 MMHG | RESPIRATION RATE: 18 BRPM

## 2024-08-26 VITALS
WEIGHT: 113 LBS | SYSTOLIC BLOOD PRESSURE: 115 MMHG | HEIGHT: 62 IN | HEART RATE: 84 BPM | BODY MASS INDEX: 20.8 KG/M2 | DIASTOLIC BLOOD PRESSURE: 76 MMHG

## 2024-08-26 DIAGNOSIS — I25.10 CORONARY ARTERY DISEASE INVOLVING NATIVE CORONARY ARTERY OF NATIVE HEART WITHOUT ANGINA PECTORIS: Primary | ICD-10-CM

## 2024-08-26 DIAGNOSIS — R91.1 LUNG NODULE: Primary | ICD-10-CM

## 2024-08-26 DIAGNOSIS — I47.10 PAROXYSMAL SVT (SUPRAVENTRICULAR TACHYCARDIA): ICD-10-CM

## 2024-08-26 DIAGNOSIS — I10 ESSENTIAL HYPERTENSION: ICD-10-CM

## 2024-08-26 DIAGNOSIS — E78.5 HYPERLIPIDEMIA LDL GOAL <70: ICD-10-CM

## 2024-08-26 PROCEDURE — 3078F DIAST BP <80 MM HG: CPT | Performed by: INTERNAL MEDICINE

## 2024-08-26 PROCEDURE — 99213 OFFICE O/P EST LOW 20 MIN: CPT | Performed by: INTERNAL MEDICINE

## 2024-08-26 PROCEDURE — 1160F RVW MEDS BY RX/DR IN RCRD: CPT | Performed by: INTERNAL MEDICINE

## 2024-08-26 PROCEDURE — 1159F MED LIST DOCD IN RCRD: CPT | Performed by: INTERNAL MEDICINE

## 2024-08-26 PROCEDURE — 3074F SYST BP LT 130 MM HG: CPT | Performed by: INTERNAL MEDICINE

## 2024-08-26 NOTE — LETTER
August 26, 2024     Mata Love MD  [address] [facility]  [address]  [place] [address]    Patient: Dacia Napier   YOB: 1945   Date of Visit: 8/26/2024       Dear Mata Love MD,    Dacia Napier was in my office today. Below are the relevant portions of my assessment and plan of care.           If you have questions, please do not hesitate to call me. I look forward to following Dacia along with you.         Sincerely,        Maegan Shelton MD        CC: MD Johan Pena MD Prabodh Mehta, MD Vanessa Renay Paddy, APRN Jason H Reynolds, MD Brittany O'Bryan, MD Savannah Mishael [facility]

## 2024-08-26 NOTE — PROGRESS NOTES
[facility]  INTERVENTIONAL CARDIOLOGY FOLLOW-UP PROGRESS NOTE        Chief Complaint  Palpitations    Subjective            History of Present Illness  Dacia Napier is a 78 y.o. female who presents to [facility].  Patient has past medical history of hyperlipidemia and paroxysmal SVT.  Patient denies syncope or presyncope.  Patient denies chest pain, dyspnea, edema or other complaints.  Patient currently doing well.  She had echo done 2024 which showed normal ejection fraction with grade 1 diastolic dysfunction.  She also had a treadmill exercise test which was normal.          Past History:  Past Medical History:   Diagnosis Date    Anemia 06/2021    Iron prescription    Arrhythmia     Bronchitis     Cataract 2019    Surgery    CHF (congestive heart failure)     Chronic kidney disease     Colon polyp     Coronary artery disease involving native coronary artery of native heart without angina pectoris 8/26/2024    Diabetes mellitus     Diverticulitis of colon     Diverticulosis 2012    Esophageal varices     Essential hypertension 8/26/2024    GERD (gastroesophageal reflux disease)     Hiatal hernia     Hyperlipidemia     Lung nodule 7/2022    On my ct scan at [facility]    Osteopenia 2019    Taking prolia/stopped in 2023    Other allergic rhinitis 01/06/2022    Rapid or irregular heartbeat     Renal insufficiency     Have only one working kidney. The other one is very small an    Seasonal allergies     Type 2 diabetes mellitus     PRE DM    Urinary tract infection 5/12/23    Reoccurring       Medical History:  Past Medical History:   Diagnosis Date    Anemia 06/2021    Iron prescription    Arrhythmia     Bronchitis     Cataract 2019    Surgery    CHF (congestive heart failure)     Chronic kidney disease     Colon polyp     Coronary artery disease involving native coronary artery of native heart without angina pectoris 8/26/2024    Diabetes mellitus     Diverticulitis of  colon     Diverticulosis 2012    Esophageal varices     Essential hypertension 8/26/2024    GERD (gastroesophageal reflux disease)     Hiatal hernia     Hyperlipidemia     Lung nodule 7/2022    On my ct scan at [facility]    Osteopenia 2019    Taking prolia/stopped in 2023    Other allergic rhinitis 01/06/2022    Rapid or irregular heartbeat     Renal insufficiency     Have only one working kidney. The other one is very small an    Seasonal allergies     Type 2 diabetes mellitus     PRE DM    Urinary tract infection 5/12/23    Reoccurring       Surgical History:   has a past surgical history that includes Tonsillectomy; Hernia repair; Hysterectomy; LASIK; Cataract extraction, bilateral; Colonoscopy; Esophagogastroduodenoscopy (N/A, 06/25/2021); Colonoscopy (N/A, 06/25/2021); Eye capsulotomy; Cardiac catheterization; Cystoscopy w/ deflux injection (N/A, 08/25/2023); Eye surgery (2019 and 2021); and Subtotal Hysterectomy (1998).     Family History:   family history includes Cancer in her brother, brother, sister, sister, and sister; Heart disease in her brother, brother, father, mother, sister, sister, and sister; Heart failure in her brother, brother, father, mother, and sister; Miscarriages / Stillbirths in her mother and sister.     Social History:   reports that she has never smoked. She has been exposed to tobacco smoke. She has never used smokeless tobacco. She reports that she does not currently use alcohol. She reports that she does not use drugs.    Allergies:   Patient has no known allergies.    Current Outpatient Medications on File Prior to Visit   Medication Sig    Accu-Chek Softclix Lancets lancets USE 1 LANCET TWICE DAILY    aspirin 81 MG EC tablet Take 1 tablet by mouth Daily. LAST DOSE 08/18/23 PT STOPPED ON OWN    Calcium Carbonate 1500 (600 Ca) MG tablet 2 (Two) Times a Day With Meals.    cetirizine (zyrTEC) 10 MG tablet Take 1 tablet by mouth Daily As Needed for Allergies.    Cholecalciferol  "(Vitamin D3) 50 MCG (2000 UT) tablet Take 1 tablet by mouth Daily.    Collagen-Boron-Hyaluronic Acid (MOVE FREE [employer] HEALTH PO) Take  by mouth.    estradiol (ESTRACE) 0.1 MG/GM vaginal cream Insert 2 g into the vagina 2 (Two) Times a Week.    ezetimibe (ZETIA) 10 MG tablet TAKE 1 TABLET BY MOUTH DAILY    glucose blood (Accu-Chek Guide) test strip TEST TWICE DAILY    Omega-3 1000 MG capsule Take 1 capsule by mouth Daily.    omeprazole (priLOSEC) 20 MG capsule Take 1 capsule by mouth Daily.    ondansetron (Zofran) 4 MG tablet 1 po 1 hour before prep, may repeat in 4-6 hrs as needed for n/v    polyethylene glycol (GoLYTELY) 236 g solution Take per office instructions    rosuvastatin (CRESTOR) 10 MG tablet TAKE 1 TABLET BY MOUTH AT NIGHT    Tirzepatide (Mounjaro) 2.5 MG/0.5ML solution pen-injector pen Inject 0.5 mL under the skin into the appropriate area as directed 1 (One) Time Per Week.    valACYclovir (VALTREX) 500 MG tablet Take 1 tablet by mouth As Needed.     No current facility-administered medications on file prior to visit.          Review of Systems   Negative except as mentioned in HPI above.    Objective     /76   Pulse 84   Ht 157.5 cm (62.01\")   Wt 51.3 kg (113 lb)   BMI 20.66 kg/m²       Physical Exam    General : Alert, awake, no acute distress  Neck : Supple, no carotid bruit, no jugular venous distention  CVS : Regular rate and rhythm, no murmur, rubs or gallops  Lungs: Clear to auscultation bilaterally, no crackles or rhonchi  Abdomen: Soft, nontender, bowel sounds heard in all 4 quadrants  Extremities: Warm, well-perfused, no pedal edema    Result Review :     The following data was reviewed by: Maegan Shelton MD on 08/26/2024:    CMP          12/28/2023    10:29 1/29/2024    13:57 6/25/2024    10:58   CMP   Glucose 130   100    BUN 15   14    Creatinine 1.05  1.10  1.01    EGFR 54.5  51.5  57.1    Sodium 139   139    Potassium 4.6   4.5    Chloride 101   103    Calcium 10.2   9.4 "    Total Protein 6.8   6.6    Albumin 4.6   4.2    Globulin 2.2   2.4    Total Bilirubin 0.4   0.6    Alkaline Phosphatase 85   118    AST (SGOT) 17   25    ALT (SGPT) 14   15    Albumin/Globulin Ratio 2.1   1.8    BUN/Creatinine Ratio 14.3   13.9    Anion Gap 10.0   9.7      CBC          12/28/2023    10:29 6/25/2024    10:58   CBC   WBC 6.71  6.35    RBC 4.78  4.52    Hemoglobin 14.0  13.1    Hematocrit 44.7  42.2    MCV 93.5  93.4    MCH 29.3  29.0    MCHC 31.3  31.0    RDW 12.6  12.7    Platelets 262  216        Lipid Panel          2/1/2024    10:35 6/25/2024    10:58   Lipid Panel   Total Cholesterol 133  145    Triglycerides 107  109    HDL Cholesterol 58  50    VLDL Cholesterol 19  20    LDL Cholesterol  56  75    LDL/HDL Ratio 0.92  1.46       A1C Last 3 Results          2/1/2024    10:35 6/25/2024    10:58   HGBA1C Last 3 Results   Hemoglobin A1C 5.90  6.10          Data reviewed: Cardiology studies    Results for orders placed during the hospital encounter of 02/01/24    Adult Transthoracic Echo Complete W/ Cont if Necessary Per Protocol    Interpretation Summary    Left ventricular systolic function is normal. Calculated left ventricular EF = 65.7%    Left ventricular diastolic function is consistent with (grade I) impaired relaxation and age.    There is mild bileaflet mitral valve prolapse present.  Mild mitral valve regurgitation.    Mild aortic valve regurgitation.    Results for orders placed in visit on 01/04/24    Treadmill Stress Test    Interpretation Summary    Exercise treadmill stress test is negative for myocardial ischemia.    Excellent exercise tolerance noted.  Maximum workload achieved was 13 METS.    There was no chest pain at maximal exercise.  There were no ischemic EKG changes at peak heart rate.    There were no exercise-induced arrhythmias.      ECG 12 Lead    Date/Time: 8/26/2024 1:24 PM  Performed by: Maegan Shelton MD    Authorized by: Maegan Shelton MD  Comparison:  compared with previous ECG   Similar to previous ECG  Rhythm: sinus rhythm  Rate: normal  QRS axis: normal  Other findings: low voltage    Clinical impression: non-specific ECG        No results found for this or any previous visit.        ASCVD Score  The 10-year ASCVD risk score (Leo GOMES, et al., 2019) is: 31.9%    Values used to calculate the score:      Age: 78 years      Sex: Female      Is Non- : No      Diabetic: Yes      Tobacco smoker: No      Systolic Blood Pressure: 115 mmHg      Is BP treated: No      HDL Cholesterol: 50 mg/dL      Total Cholesterol: 145 mg/dL         Assessment and Plan          Diagnoses and all orders for this visit:    1. Coronary artery disease involving native coronary artery of native heart without angina pectoris (Primary)    2. Essential hypertension    3. Hyperlipidemia LDL goal <70    4. Paroxysmal SVT (supraventricular tachycardia)        Plan  Patient currently doing well.  CT scan of chest showed coronary artery calcification.  Patient recommended to continue on aspirin and rosuvastatin.  Blood pressure well-controlled.  Lipid profile well-controlled.  No new medication changes at this visit other than resuming rosuvastatin.  Occasional palpitation likely secondary to SVT episode that did not lifestyle-limiting.  Patient recommended to keep close observation.  Patient can follow-up in 6 months or sooner if needed.        Patient was educated on heart healthy diet, daily exercise and achieving optimal weight.     Follow Up     Return in about 6 months (around 2/26/2025) for With Layne Wilkinson.      Dacia Napier I spent 30 minutes caring for this patient on this date of service. This time includes time spent by me in the following activities:preparing for the visit, reviewing tests, obtaining and/or reviewing a separately obtained history, performing a medically appropriate examination and/or evaluation , counseling and  educating the patient/family/caregiver, ordering medications, tests, or procedures, referring and communicating with other health care professionals , documenting information in the medical record, independently interpreting results and communicating that information with the patient/family/caregiver, and care coordination.     This is an acute or chronic illness that poses a threat to life or bodily function. The above treatment plan involves a high risk of complications and/or mortality of patient management.    The patient was seen and examined. Work by the provider also included review and/or ordering of lab tests, review and/or ordering of radiology tests, review and/or ordering of medicine tests, discussion with other physicians or providers, independent review of data, obtaining old records, review/summation of old records, and/or other review.    I have reviewed the family history, social history, and past medical history for this patient. Previous information and data has been reviewed and updated as needed. I have reviewed and verified the chief complaint, history, and other documentation. The patient was interviewed and examined in the clinic and the chart reviewed. The previous observations, recommendations, and conclusions were reviewed including those of other providers.     The plan was discussed with the patient and/or family. The patient was given time to ask questions and these questions were answered. At the conclusion of their visit they had no additional questions or concerns and all questions were answered to their satisfaction.     Patient was given instructions and counseling regarding her condition or for health maintenance advice. Please see specific information pulled into the AVS if appropriate.      Maegan Shelton MD, FACC  08/26/24  12:01 EDT    Dictated Utilizing Dragon Dictation

## 2024-08-26 NOTE — PROGRESS NOTES
Pulmonary Office Follow-up    Subjective     Dacia Napier is seen today at the office for   Chief Complaint   Patient presents with    Follow-up     6 month    Bronchitis     Lung nodule         HPI  Dacia Napier is a 78 y.o. female with a PMH significant for lung nodule presents for follow-up patient denies any shortness of breath cough wheeze or expectoration she does not smoke      Tobacco use history:  Never smoker      Patient Active Problem List   Diagnosis    Postmenopausal atrophic vaginitis    Agenesis of right kidney    Type 2 diabetes mellitus without complication    Hiatal hernia with GERD without esophagitis    Hyperlipidemia LDL goal <70    Postmenopausal osteoporosis    Recurrent urinary tract infection    Other allergic rhinitis    Palpitations (sees Cardiology--negative w/u)     Heart murmur (neg ECHO in 2021)     Stage 3a chronic kidney disease    Urgency incontinence    Recurrent cold sores    Low serum iron    Intrinsic sphincter deficiency    History of colon polyps (last scope was in 2021)    Chronic idiopathic constipation    Esophageal dysphagia    Coronary artery disease involving native coronary artery of native heart without angina pectoris    Essential hypertension    Paroxysmal SVT (supraventricular tachycardia)       Review of Systems  Review of Systems   All other systems reviewed and are negative.    As described in the HPI. Otherwise, remainder of ROS (14 systems) were negative.    Medications, Allergies, Social, and Family Histories reviewed as per EMR.    Result Review :            Objective     Vitals:    08/26/24 1348   BP: 114/74   Pulse: 77   Resp: 18   Temp: 98 °F (36.7 °C)   SpO2: 97%         08/26/24  1348   Weight: 51.4 kg (113 lb 6.4 oz)       Physical Exam  Vitals and nursing note reviewed.   Constitutional:       Appearance: Normal appearance.   HENT:      Head: Normocephalic and atraumatic.      Nose: Nose normal.      Mouth/Throat:      Mouth: Mucous membranes  are moist.      Pharynx: Oropharynx is clear.   Eyes:      Extraocular Movements: Extraocular movements intact.      Conjunctiva/sclera: Conjunctivae normal.      Pupils: Pupils are equal, round, and reactive to light.   Cardiovascular:      Rate and Rhythm: Normal rate and regular rhythm.      Pulses: Normal pulses.      Heart sounds: Normal heart sounds.   Pulmonary:      Effort: Pulmonary effort is normal.      Breath sounds: Normal breath sounds.   Abdominal:      General: Abdomen is flat. Bowel sounds are normal.      Palpations: Abdomen is soft.   Musculoskeletal:         General: Normal range of motion.      Cervical back: Normal range of motion and neck supple.   Skin:     General: Skin is warm.      Capillary Refill: Capillary refill takes 2 to 3 seconds.   Neurological:      General: No focal deficit present.      Mental Status: She is alert and oriented to person, place, and time.   Psychiatric:         Mood and Affect: Mood normal.         Behavior: Behavior normal.         Mammo Diagnostic Digital Tomosynthesis Bilateral With CAD    Result Date: 8/12/2024   IMPRESSION: Benign bilateral diagnostic mammogram.  Bilateral screening mammogram is recommended in a year.  TISSUE DENSITY: The breasts are heterogeneously dense, which may obscure small masses. The patient will receive a letter regarding breast density.  BI-RADS ASSESSMENT: BI-RADS 2. Benign findings.   Note: It has been reported that there is approximately a 15% false negative in mammography. Therefore, management of a palpable abnormality should not be deferred because of a negative mammogram.    Electronically Signed By-ROMEO VERAS MD On:8/12/2024 10:19 AM      CT Chest Low Dose Cancer Screening WO    Result Date: 7/15/2024  Impression: Stable pulmonary nodules. Recommendation: Continue annual screening with LDCT Lung Rads Assessment: Lung-RADS L2 - Benign appearance or <1% chance of malignancy. Electronically Signed: Jason Dunn MD   7/15/2024 8:35 AM EDT  Workstation ID: [id number]      Assessment & Plan     Diagnoses and all orders for this visit:    1. Lung nodule (Primary)         Discussion/ Recommendations:   CT scan reviewed shows stable lung nodule  Patient is advised to have repeat CT next year and was reassured  Vaccinations discussed and recommended    BMI is within normal parameters. No other follow-up for BMI required.        Return in about 6 months (around 2/26/2025).          This document has been electronically signed by Johan De Jesus MD on August 26, 2024 13:54 EDT

## 2024-09-06 ENCOUNTER — TELEPHONE (OUTPATIENT)
Dept: GASTROENTEROLOGY | Facility: CLINIC | Age: 79
End: 2024-09-06
Payer: MEDICARE

## 2024-09-06 NOTE — TELEPHONE ENCOUNTER
Procedure: Colonoscopy and/or EGD     Med Directive: NA     PMH: CAD, PSVT, HTN, HLD     Last Seen: 8/26/24

## 2024-09-06 NOTE — TELEPHONE ENCOUNTER
Cardiac Clearance Request    2024      Patient Name: Dacia Napier  : 1945      Dear Dr. Shelton,    This patient is waiting to have a Colonoscopy and/or Esophagogastroduodenoscopy which I will perform at [facility] on _24_. Please respond to this request noting your recommendations regarding clearance from a cardiology standpoint.  You may contact our office at [phone] Option 2 with any questions. I appreciate your prompt response in this matter. Please return this form to our office as soon as possible to [phone].    ____ I approve my patient from a cardiology standpoint    ____ I do NOT approve my patient from a cardiology standpoint at this time      Approving physician name (please print): _____________________________________________      Approving physician signature: ________________________________ Date:________________      Sincerely,  [facility] [address]  Dr. Olvera's Office                            Please fax approval or denial to our office as soon as possible.

## 2024-09-06 NOTE — TELEPHONE ENCOUNTER
2024    Dear Dr. De Jesus,      Patient Name: Dacia Napier  : 1945      This patient is waiting to have a Colonoscopy and/or Esophagogastroduodenoscopy which I will perform at [facility] on _24_. Please respond to this request noting your recommendations regarding clearance from a pulmonary standpoint.  You may contact our office at [phone] (option 2) with any questions. I appreciate your prompt response in this matter. Please return this form to our office as soon as possible to [phone].    ____ I approve my patient from a pulmonary standpoint    ____ I do NOT approve my patient from a pulmonary standpoint at this time      Approving physician name (please print): _____________________________________________      Approving physician signature: ________________________________ Date:________________  Sincerely,  [facility] - [facility]astroenter[facility] - [facility] [address]            Please fax approval or denial to our office as soon as possible.

## 2024-09-08 DIAGNOSIS — E11.9 TYPE 2 DIABETES MELLITUS WITHOUT COMPLICATION, WITHOUT LONG-TERM CURRENT USE OF INSULIN: ICD-10-CM

## 2024-09-09 RX ORDER — EZETIMIBE 10 MG/1
10 TABLET ORAL DAILY
Qty: 90 TABLET | Refills: 1 | Status: SHIPPED | OUTPATIENT
Start: 2024-09-09

## 2024-09-17 ENCOUNTER — ANESTHESIA EVENT (OUTPATIENT)
Dept: GASTROENTEROLOGY | Facility: HOSPITAL | Age: 79
End: 2024-09-17
Payer: MEDICARE

## 2024-09-19 ENCOUNTER — ANESTHESIA (OUTPATIENT)
Dept: GASTROENTEROLOGY | Facility: HOSPITAL | Age: 79
End: 2024-09-19
Payer: MEDICARE

## 2024-09-19 ENCOUNTER — HOSPITAL ENCOUNTER (OUTPATIENT)
Facility: HOSPITAL | Age: 79
Setting detail: HOSPITAL OUTPATIENT SURGERY
Discharge: HOME | End: 2024-09-19
Attending: INTERNAL MEDICINE | Admitting: INTERNAL MEDICINE
Payer: MEDICARE

## 2024-09-19 VITALS
BODY MASS INDEX: 20.48 KG/M2 | DIASTOLIC BLOOD PRESSURE: 65 MMHG | TEMPERATURE: 98 F | HEART RATE: 78 BPM | SYSTOLIC BLOOD PRESSURE: 110 MMHG | OXYGEN SATURATION: 95 % | WEIGHT: 111.99 LBS | RESPIRATION RATE: 21 BRPM

## 2024-09-19 DIAGNOSIS — K59.04 CHRONIC IDIOPATHIC CONSTIPATION: ICD-10-CM

## 2024-09-19 DIAGNOSIS — R13.19 ESOPHAGEAL DYSPHAGIA: ICD-10-CM

## 2024-09-19 DIAGNOSIS — Z86.010 HISTORY OF COLON POLYPS: ICD-10-CM

## 2024-09-19 LAB — GLUCOSE BLDC GLUCOMTR-MCNC: 105 MG/DL (ref 70–99)

## 2024-09-19 PROCEDURE — 25010000002 PROPOFOL 10 MG/ML EMULSION: Performed by: NURSE ANESTHETIST, CERTIFIED REGISTERED

## 2024-09-19 PROCEDURE — 93010 ELECTROCARDIOGRAM REPORT: CPT | Performed by: INTERNAL MEDICINE

## 2024-09-19 PROCEDURE — 82948 REAGENT STRIP/BLOOD GLUCOSE: CPT | Performed by: NURSE ANESTHETIST, CERTIFIED REGISTERED

## 2024-09-19 PROCEDURE — 45385 COLONOSCOPY W/LESION REMOVAL: CPT | Performed by: INTERNAL MEDICINE

## 2024-09-19 PROCEDURE — 43249 ESOPH EGD DILATION <30 MM: CPT | Performed by: INTERNAL MEDICINE

## 2024-09-19 PROCEDURE — 25810000003 LACTATED RINGERS PER 1000 ML: Performed by: NURSE ANESTHETIST, CERTIFIED REGISTERED

## 2024-09-19 PROCEDURE — 93005 ELECTROCARDIOGRAM TRACING: CPT

## 2024-09-19 PROCEDURE — 43239 EGD BIOPSY SINGLE/MULTIPLE: CPT | Performed by: INTERNAL MEDICINE

## 2024-09-19 PROCEDURE — 88305 TISSUE EXAM BY PATHOLOGIST: CPT | Performed by: INTERNAL MEDICINE

## 2024-09-19 PROCEDURE — C1726 CATH, BAL DIL, NON-VASCULAR: HCPCS | Performed by: INTERNAL MEDICINE

## 2024-09-19 RX ORDER — LIDOCAINE HYDROCHLORIDE 20 MG/ML
INJECTION, SOLUTION EPIDURAL; INFILTRATION; INTRACAUDAL; PERINEURAL AS NEEDED
Status: DISCONTINUED | OUTPATIENT
Start: 2024-09-19 | End: 2024-09-19 | Stop reason: SURG

## 2024-09-19 RX ORDER — PHENYLEPHRINE HCL IN 0.9% NACL 1 MG/10 ML
SYRINGE (ML) INTRAVENOUS AS NEEDED
Status: DISCONTINUED | OUTPATIENT
Start: 2024-09-19 | End: 2024-09-19 | Stop reason: SURG

## 2024-09-19 RX ORDER — PANTOPRAZOLE SODIUM 40 MG/1
40 TABLET, DELAYED RELEASE ORAL DAILY
Qty: 30 TABLET | Refills: 5 | Status: SHIPPED | OUTPATIENT
Start: 2024-09-19 | End: 2024-09-26 | Stop reason: ALTCHOICE

## 2024-09-19 RX ORDER — PROPOFOL 10 MG/ML
VIAL (ML) INTRAVENOUS AS NEEDED
Status: DISCONTINUED | OUTPATIENT
Start: 2024-09-19 | End: 2024-09-19 | Stop reason: SURG

## 2024-09-19 RX ORDER — SODIUM CHLORIDE, SODIUM LACTATE, POTASSIUM CHLORIDE, CALCIUM CHLORIDE 600; 310; 30; 20 MG/100ML; MG/100ML; MG/100ML; MG/100ML
30 INJECTION, SOLUTION INTRAVENOUS CONTINUOUS
Status: DISCONTINUED | OUTPATIENT
Start: 2024-09-19 | End: 2024-09-19 | Stop reason: HOSPADM

## 2024-09-19 RX ORDER — SODIUM CHLORIDE, SODIUM LACTATE, POTASSIUM CHLORIDE, CALCIUM CHLORIDE 600; 310; 30; 20 MG/100ML; MG/100ML; MG/100ML; MG/100ML
INJECTION, SOLUTION INTRAVENOUS CONTINUOUS PRN
Status: DISCONTINUED | OUTPATIENT
Start: 2024-09-19 | End: 2024-09-19 | Stop reason: SURG

## 2024-09-19 RX ADMIN — Medication 100 MCG: at 09:28

## 2024-09-19 RX ADMIN — Medication 100 MCG: at 09:34

## 2024-09-19 RX ADMIN — PROPOFOL 250 MCG/KG/MIN: 10 INJECTION, EMULSION INTRAVENOUS at 09:09

## 2024-09-19 RX ADMIN — PROPOFOL 50 MG: 10 INJECTION, EMULSION INTRAVENOUS at 09:09

## 2024-09-19 RX ADMIN — Medication 100 MCG: at 09:31

## 2024-09-19 RX ADMIN — SODIUM CHLORIDE, POTASSIUM CHLORIDE, SODIUM LACTATE AND CALCIUM CHLORIDE 30 ML/HR: 600; 310; 30; 20 INJECTION, SOLUTION INTRAVENOUS at 07:44

## 2024-09-19 RX ADMIN — SODIUM CHLORIDE, POTASSIUM CHLORIDE, SODIUM LACTATE AND CALCIUM CHLORIDE: 600; 310; 30; 20 INJECTION, SOLUTION INTRAVENOUS at 09:06

## 2024-09-19 RX ADMIN — LIDOCAINE HYDROCHLORIDE 100 MG: 20 INJECTION, SOLUTION INTRAVENOUS at 09:08

## 2024-09-20 LAB
CYTO UR: NORMAL
LAB AP CASE REPORT: NORMAL
LAB AP CLINICAL INFORMATION: NORMAL
PATH REPORT.FINAL DX SPEC: NORMAL
PATH REPORT.GROSS SPEC: NORMAL
QT INTERVAL: 414 MS
QTC INTERVAL: 434 MS

## 2024-09-26 ENCOUNTER — TELEPHONE (OUTPATIENT)
Dept: GASTROENTEROLOGY | Facility: CLINIC | Age: 79
End: 2024-09-26
Payer: MEDICARE

## 2024-09-30 ENCOUNTER — TELEPHONE (OUTPATIENT)
Dept: GASTROENTEROLOGY | Facility: CLINIC | Age: 79
End: 2024-09-30
Payer: MEDICARE

## 2024-09-30 NOTE — TELEPHONE ENCOUNTER
Spoke with patient, she stated she has been taking omeprazole 20mg and after her procedure  said he was going to up it to 40mg. When the patient went to get her script she said it was changed to pantoprazole 40mg. I let her know that they are both for the same thing, but she should only take one. Patient stated she would start taking her pantoprazole 40mg and discontinue her omeprazole 20mg.

## 2024-09-30 NOTE — TELEPHONE ENCOUNTER
Hub staff attempted to follow warm transfer process and was unsuccessful     Caller: Dacia Napier    Relationship to patient: Self    Best call back number: [phone]    Patient is needing: PATIENT IS REQUESTING A CALL BACK HAS A QUESTION REGARDING HER PANTOPRAZOLE PLEASE CALL PATIENT TO ADVISE

## 2024-10-01 NOTE — TELEPHONE ENCOUNTER
[facility]   EGD/Colon Completed: 9.19.24  Last Office Visit: 5.15.24    Pt called back today.     She advised she was already on Omeprazole 20 mg. She would like to know if she can just double up on her Omeprazole 20mg until she finishes her rx so she is not wasting her medication and then begin Pantoprazole 40mg? Please advise.

## 2024-10-11 ENCOUNTER — OFFICE VISIT (OUTPATIENT)
Dept: DIABETES SERVICES | Facility: CLINIC | Age: 79
End: 2024-10-11
Payer: MEDICARE

## 2024-10-11 VITALS
OXYGEN SATURATION: 99 % | HEIGHT: 62 IN | DIASTOLIC BLOOD PRESSURE: 59 MMHG | TEMPERATURE: 98.1 F | BODY MASS INDEX: 21.2 KG/M2 | WEIGHT: 115.2 LBS | SYSTOLIC BLOOD PRESSURE: 117 MMHG | HEART RATE: 67 BPM

## 2024-10-11 DIAGNOSIS — E11.22 CONTROLLED TYPE 2 DIABETES MELLITUS WITH STAGE 3 CHRONIC KIDNEY DISEASE, WITHOUT LONG-TERM CURRENT USE OF INSULIN: Primary | ICD-10-CM

## 2024-10-11 DIAGNOSIS — N18.30 CONTROLLED TYPE 2 DIABETES MELLITUS WITH STAGE 3 CHRONIC KIDNEY DISEASE, WITHOUT LONG-TERM CURRENT USE OF INSULIN: Primary | ICD-10-CM

## 2024-10-11 LAB
EXPIRATION DATE: NORMAL
HBA1C MFR BLD: 5.7 % (ref 4.5–5.7)
Lab: NORMAL

## 2024-10-11 NOTE — PROGRESS NOTES
Chief Complaint  Diabetes (Med mgt, A1c eval - needing financial assistance for mounjaro)    Referred By: No ref. provider found    Subjective          Dacia Napier presents to [facility] for diabetes medication management    History of Present Illness    Visit type:  follow-up  Diabetes type:  Type 2  Current diabetes status/concerns/issues:  She remains on the lowest dose of the Mounjaro without difficulty.  She does have high cost to the medication  Other health concerns: No new health concerns  Current Diabetes symptoms:    Polyuria: No   Polydipsia: No   Polyphagia: No   Blurred vision: No   Excessive fatigue: No  Known Diabetes complications:  Neuropathy: None; Location: N/A  Renal: Stage IIIa moderate (GFR = 45-59 mL/min) and Microalbuminuria - NEGATIVE  Eyes: No Retinopathy reported on current eye exam; Location: N/A  Amputation/Wounds: None  GI: None  Cardiovascular: Hypercholesterolemia  ED: N/A  Other: None  Hypoglycemia:  None reported at this time  Hypoglycemia Symptoms:  No hypoglycemia at this time  Current diabetes treatment:  Mounjaro 2.5 mg once weekly    Blood glucose device:  Meter  Blood glucose monitoring frequency:  Random/occasional  Blood glucose range/average:   103-125  Glucose Source: Patient Reported  Diet:  Limits high carb/sweet foods, Avoids sugary drinks  Activity/Exercise:   she tries to stay active    Past Medical History:   Diagnosis Date    Anemia 06/2021    Iron prescription    Arrhythmia     Bronchitis     Cataract 2019    Surgery    CHF (congestive heart failure)     Chronic kidney disease     Colon polyp     Coronary artery disease involving native coronary artery of native heart without angina pectoris 08/26/2024    Diabetes mellitus     Diverticulitis of colon     Diverticulosis 2012    Esophageal varices     Essential hypertension 08/26/2024    GERD (gastroesophageal reflux disease)     Hiatal hernia     Hyperlipidemia     Lung nodule  2022    On my ct scan at [facility]    Osteopenia 2019    Taking prolia/stopped in     Other allergic rhinitis 2022    Rapid or irregular heartbeat     Renal insufficiency     Have only one working kidney. The other one is very small an    Seasonal allergies     Type 2 diabetes mellitus     PRE DM    Urinary tract infection 23    Reoccurring     Past Surgical History:   Procedure Laterality Date    CARDIAC CATHETERIZATION      CATARACT EXTRACTION, BILATERAL      COLONOSCOPY      COLONOSCOPY N/A 2021    POLYP    COLONOSCOPY N/A 2024    Procedure: COLONOSCOPY WITH COLD SNARE POLYPECTOMY;  Surgeon: Rod Olvera MD;  Location: [facility] ENDOSCOPY;  Service: Gastroenterology;  Laterality: N/A;  COLON POLYP, DIVERTICULOSIS    CYSTOSCOPY W/ BULKING AGENT INJECTION N/A 2023    Procedure: CYSTOSCOPY WITH BULKING AGENT INJECTION;  Surgeon: Chrissie Katz MD;  Location: [facility] MAIN OR;  Service: Urology;  Laterality: N/A;    ENDOSCOPY N/A 2021    Procedure: ESOPHAGOGASTRODUODENOSCOPY;  Surgeon: Ty Partida MD;  Location: [facility] ENDOSCOPY;  Service: General;  Laterality: N/A;  Hiatal Hernia    ENDOSCOPY N/A 2024    Procedure: ESOPHAGOGASTRODUODENOSCOPY WITH BIOPSIES AND BALLON DILATATION 18-20(DILATED TO 20);  Surgeon: Rod Olvera MD;  Location: [facility] ENDOSCOPY;  Service: Gastroenterology;  Laterality: N/A;  HIATAL HERNIA, SCHATSKIS RING    EYE CAPSULOTOMY WITH LASER      [facility]   [facility]  [facility] foggy eyes    HERNIA REPAIR      HYSTERECTOMY      LASIK      SUBTOTAL HYSTERECTOMY      Did not remove uterus    TONSILLECTOMY       Family History   Problem Relation Age of Onset    Heart disease Mother          of massive heart attach at age 66    Heart failure Mother         Congestive heart failure    Miscarriages / Stillbirths Mother         2 miscarriages    Heart disease Father         Had 3 heart  attacks and  on  at age 79    Heart failure Father         3 heart attacks in 6 years    Heart disease Sister         Sister with heart problems    Cancer Sister         Breast cancer    Cancer Sister         Melanoma skin cancer    Heart disease Sister         Pace maker    Heart failure Sister         5 by pass    Heart disease Sister     Cancer Sister     Heart disease Brother         Stents  of heart attack    Cancer Brother         Tongue cancer    Heart failure Brother         Pace maker defibrillator and stents at 40    Heart failure Brother         By pass    Heart disease Brother         Heart problems    Cancer Brother     Heart failure Brother     Miscarriages / Stillbirths Sister     Alice Hyperthermia Neg Hx     Colon cancer Neg Hx      Social History     Socioeconomic History    Marital status:    Tobacco Use    Smoking status: Never     Passive exposure: Past    Smokeless tobacco: Never   Vaping Use    Vaping status: Never Used   Substance and Sexual Activity    Alcohol use: Not Currently     Comment: Hardly ever    Drug use: Never    Sexual activity: Not Currently     Partners: Male     Birth control/protection: Condom, Diaphragm, Spermicide, Pill     No Known Allergies    Current Outpatient Medications:     Accu-Chek Softclix Lancets lancets, USE 1 LANCET TWICE DAILY, Disp: 100 each, Rfl: 3    aspirin 81 MG EC tablet, Take 1 tablet by mouth Daily. LAST DOSE 23 PT STOPPED ON OWN, Disp: , Rfl:     Calcium Carbonate 1500 (600 Ca) MG tablet, 2 (Two) Times a Day With Meals., Disp: , Rfl:     cetirizine (zyrTEC) 10 MG tablet, Take 1 tablet by mouth Daily As Needed for Allergies., Disp: 90 tablet, Rfl: 3    Cholecalciferol (Vitamin D3) 50 MCG (2000 UT) tablet, Take 1 tablet by mouth Daily., Disp: , Rfl:     Collagen-Boron-Hyaluronic Acid (MOVE FREE ULTRA JOINT HEALTH PO), Take  by mouth., Disp: , Rfl:     ezetimibe (ZETIA) 10 MG tablet, TAKE 1 TABLET BY MOUTH DAILY, Disp: 90 tablet,  "Rfl: 1    glucose blood (Accu-Chek Guide) test strip, TEST TWICE DAILY, Disp: 200 each, Rfl: 3    Omega-3 1000 MG capsule, Take 1 capsule by mouth Daily., Disp: , Rfl:     rosuvastatin (CRESTOR) 10 MG tablet, TAKE 1 TABLET BY MOUTH AT NIGHT, Disp: 90 tablet, Rfl: 1    Tirzepatide (Mounjaro) 2.5 MG/0.5ML solution pen-injector pen, Inject 0.5 mL under the skin into the appropriate area as directed 1 (One) Time Per Week., Disp: 2 mL, Rfl: 5    Objective     Vitals:    10/11/24 1336   BP: 117/59   BP Location: Right arm   Patient Position: Sitting   Cuff Size: Adult   Pulse: 67   Temp: 98.1 °F (36.7 °C)   TempSrc: Temporal   SpO2: 99%   Weight: 52.3 kg (115 lb 3.2 oz)   Height: 157.5 cm (62\")     Body mass index is 21.07 kg/m².    Physical Exam  Constitutional:       Appearance: Normal appearance. She is normal weight.   HENT:      Head: Normocephalic and atraumatic.      Right Ear: External ear normal.      Left Ear: External ear normal.      Nose: Nose normal.   Eyes:      Extraocular Movements: Extraocular movements intact.      Conjunctiva/sclera: Conjunctivae normal.   Pulmonary:      Effort: Pulmonary effort is normal.   Musculoskeletal:         General: Normal range of motion.      Cervical back: Normal range of motion.   Skin:     General: Skin is warm and dry.   Neurological:      General: No focal deficit present.      Mental Status: She is alert and oriented to person, place, and time. Mental status is at baseline.   Psychiatric:         Mood and Affect: Mood normal.         Behavior: Behavior normal.         Thought Content: Thought content normal.         Judgment: Judgment normal.             Result Review :   The following data was reviewed by: JANES Betancourt on 10/11/2024:    Most Recent A1C          10/11/2024    13:41   HGBA1C Most Recent   Hemoglobin A1C 5.7        A1C Last 3 Results          2/1/2024    10:35 6/25/2024    10:58 10/11/2024    13:41   HGBA1C Last 3 Results   Hemoglobin A1C " 5.90  6.10  5.7      A1c collected in the office today is 5.7%, indicating Controlled Type II diabetes.  This result is down from the prior result of 6.1% collected on 6/25/24       Creatinine   Date Value Ref Range Status   06/25/2024 1.01 (H) 0.57 - 1.00 mg/dL Final   01/29/2024 1.10 0.60 - 1.30 mg/dL Final     Comment:     Serial Number: [id number]:  [id number]     eGFR   Date Value Ref Range Status   06/25/2024 57.1 (L) >60.0 mL/min/1.73 Final   01/29/2024 51.5 (L) >60.0 mL/min/1.73 Final     Labs collected on 6/25/2024 show Stage IIIa moderate (GFR = 45-59 mL/min    Microalbumin, Urine   Date Value Ref Range Status   06/25/2024 <1.2 mg/dL Final   12/28/2023 <1.2 mg/dL Final     Creatinine, Urine   Date Value Ref Range Status   06/25/2024 96.0 mg/dL Final   06/25/2024 103.1 mg/dL Final     Microalbumin/Creatinine Ratio   Date Value Ref Range Status   06/25/2024   Final     Comment:     Unable to calculate   12/28/2023   Final     Comment:     Unable to calculate     Urine microalbuminuria collected on 6/25/2024 is negative for microalbuminuria    Total Cholesterol   Date Value Ref Range Status   06/25/2024 145 0 - 200 mg/dL Final   02/01/2024 133 0 - 200 mg/dL Final     Triglycerides   Date Value Ref Range Status   06/25/2024 109 0 - 150 mg/dL Final   02/01/2024 107 0 - 150 mg/dL Final     HDL Cholesterol   Date Value Ref Range Status   06/25/2024 50 40 - 60 mg/dL Final   02/01/2024 58 40 - 60 mg/dL Final     LDL Cholesterol    Date Value Ref Range Status   06/25/2024 75 0 - 100 mg/dL Final   02/01/2024 56 0 - 100 mg/dL Final     Lipid panel collected on 6/25/2024 shows normal lipid panel            Assessment: Her A1c remains very well-controlled without problematic hypoglycemia.  She continues to take the smallest dose of Mounjaro and successfully manages both her weight and her glucose levels.  She is having some problems with the expense of the medication because she is in her coverage [employer].      Diagnoses  and all orders for this visit:    1. Controlled type 2 diabetes mellitus with stage 3 chronic kidney disease, without long-term current use of insulin (Primary)  -     POC Glycosylated Hemoglobin (Hb A1C)        Plan: No changes will be made to the treatment plan today.  The patient was encouraged to call the [facility] office to see if samples might be available to assist her through this coverage gap.     The patient will monitor her blood glucose levels once a day.  If she develops problematic hyperglycemia or hypoglycemia or adverse drug reactions, she will contact the office for further instructions.        Follow Up     Return in about 6 months (around 4/11/2025) for Medication Management.    Patient was given instructions and counseling regarding her condition or for health maintenance advice. Please see specific information pulled into the AVS if appropriate.     Annabelle Buckley, JANES  10/11/2024      Dictated Utilizing Dragon Dictation.  Please note that portions of this note were completed with a voice recognition program.  Part of this note may be an electronic transcription/translation of spoken language to printed text using the Dragon Dictation System.

## 2024-10-14 ENCOUNTER — TELEPHONE (OUTPATIENT)
Dept: DIABETES SERVICES | Facility: HOSPITAL | Age: 79
End: 2024-10-14

## 2024-10-17 ENCOUNTER — LAB (OUTPATIENT)
Dept: LAB | Facility: HOSPITAL | Age: 79
End: 2024-10-17
Payer: MEDICARE

## 2024-10-17 DIAGNOSIS — E11.9 TYPE 2 DIABETES MELLITUS WITHOUT COMPLICATION, WITHOUT LONG-TERM CURRENT USE OF INSULIN: ICD-10-CM

## 2024-10-17 DIAGNOSIS — R00.2 PALPITATIONS: ICD-10-CM

## 2024-10-17 DIAGNOSIS — E78.00 HIGH CHOLESTEROL: ICD-10-CM

## 2024-10-17 LAB
ALBUMIN SERPL-MCNC: 3.9 G/DL (ref 3.5–5.2)
ALBUMIN/GLOB SERPL: 1.4 G/DL
ALP SERPL-CCNC: 115 U/L (ref 39–117)
ALT SERPL W P-5'-P-CCNC: 10 U/L (ref 1–33)
ANION GAP SERPL CALCULATED.3IONS-SCNC: 8.2 MMOL/L (ref 5–15)
AST SERPL-CCNC: 19 U/L (ref 1–32)
BILIRUB SERPL-MCNC: 0.7 MG/DL (ref 0–1.2)
BUN SERPL-MCNC: 14 MG/DL (ref 8–23)
BUN/CREAT SERPL: 14.6 (ref 7–25)
CALCIUM SPEC-SCNC: 9.3 MG/DL (ref 8.6–10.5)
CHLORIDE SERPL-SCNC: 106 MMOL/L (ref 98–107)
CHOLEST SERPL-MCNC: 174 MG/DL (ref 0–200)
CO2 SERPL-SCNC: 27.8 MMOL/L (ref 22–29)
CREAT SERPL-MCNC: 0.96 MG/DL (ref 0.57–1)
EGFRCR SERPLBLD CKD-EPI 2021: 60.7 ML/MIN/1.73
GLOBULIN UR ELPH-MCNC: 2.7 GM/DL
GLUCOSE SERPL-MCNC: 107 MG/DL (ref 65–99)
HBA1C MFR BLD: 6.2 % (ref 4.8–5.6)
HDLC SERPL-MCNC: 48 MG/DL (ref 40–60)
LDLC SERPL CALC-MCNC: 101 MG/DL (ref 0–100)
LDLC/HDLC SERPL: 2.04 {RATIO}
POTASSIUM SERPL-SCNC: 4.6 MMOL/L (ref 3.5–5.2)
PROT SERPL-MCNC: 6.6 G/DL (ref 6–8.5)
SODIUM SERPL-SCNC: 142 MMOL/L (ref 136–145)
TRIGL SERPL-MCNC: 140 MG/DL (ref 0–150)
TSH SERPL DL<=0.05 MIU/L-ACNC: 3.6 UIU/ML (ref 0.27–4.2)
VLDLC SERPL-MCNC: 25 MG/DL (ref 5–40)

## 2024-10-17 PROCEDURE — 83036 HEMOGLOBIN GLYCOSYLATED A1C: CPT

## 2024-10-17 PROCEDURE — 80061 LIPID PANEL: CPT

## 2024-10-17 PROCEDURE — 36415 COLL VENOUS BLD VENIPUNCTURE: CPT

## 2024-10-17 PROCEDURE — 84443 ASSAY THYROID STIM HORMONE: CPT

## 2024-10-17 PROCEDURE — 80053 COMPREHEN METABOLIC PANEL: CPT

## 2024-11-11 RX ORDER — ESTRADIOL 0.1 MG/G
CREAM VAGINAL
Qty: 42.5 G | Refills: 3 | OUTPATIENT
Start: 2024-11-11

## 2024-11-11 NOTE — TELEPHONE ENCOUNTER
Rx Refill Note  Requested Prescriptions     Pending Prescriptions Disp Refills    estradiol (ESTRACE) 0.1 MG/GM vaginal cream [Pharmacy Med Name: Estradiol 0.1 MG/GM Vaginal Cream] 42.5 g 3     Sig: INSERT 2 GRAMS VAGINALLY TWICE  WEEKLY      Last office visit with prescribing clinician: 7/29/2024   Last telemedicine visit with prescribing clinician: Visit date not found   Next office visit with prescribing clinician: 1/10/2025       Spoke to pt and she said that her gyn prescribes that.  Denied from our office.     Taylor Adams LPN  11/11/24, 09:56 EST

## 2024-11-18 ENCOUNTER — E-VISIT (OUTPATIENT)
Dept: ADMINISTRATIVE | Facility: OTHER | Age: 79
End: 2024-11-18
Payer: MEDICARE

## 2024-11-18 ENCOUNTER — TELEPHONE (OUTPATIENT)
Dept: PULMONOLOGY | Facility: CLINIC | Age: 79
End: 2024-11-18
Payer: MEDICARE

## 2024-11-18 ENCOUNTER — TELEMEDICINE (OUTPATIENT)
Dept: FAMILY MEDICINE CLINIC | Facility: TELEHEALTH | Age: 79
End: 2024-11-18
Payer: MEDICARE

## 2024-11-18 DIAGNOSIS — T36.95XA ANTIBIOTIC-INDUCED YEAST INFECTION: ICD-10-CM

## 2024-11-18 DIAGNOSIS — J01.00 ACUTE NON-RECURRENT MAXILLARY SINUSITIS: Primary | ICD-10-CM

## 2024-11-18 DIAGNOSIS — B37.9 ANTIBIOTIC-INDUCED YEAST INFECTION: ICD-10-CM

## 2024-11-18 PROCEDURE — 99213 OFFICE O/P EST LOW 20 MIN: CPT | Performed by: NURSE PRACTITIONER

## 2024-11-18 RX ORDER — FLUCONAZOLE 150 MG/1
150 TABLET ORAL ONCE
Qty: 1 TABLET | Refills: 0 | Status: SHIPPED | OUTPATIENT
Start: 2024-11-18 | End: 2024-11-18

## 2024-11-18 RX ORDER — AZITHROMYCIN 250 MG/1
TABLET, FILM COATED ORAL
Qty: 6 TABLET | Refills: 0 | Status: SHIPPED | OUTPATIENT
Start: 2024-11-18

## 2024-11-18 RX ORDER — METHYLPREDNISOLONE 4 MG/1
TABLET ORAL
Qty: 1 EACH | Refills: 0 | Status: SHIPPED | OUTPATIENT
Start: 2024-11-18

## 2024-11-18 NOTE — TELEPHONE ENCOUNTER
Per Dr De Jesus, patient to go to nearest Urgent care or emergency room. Patient verbalized understanding

## 2024-11-18 NOTE — PROGRESS NOTES
Subjective   Dacia Napier is a 79 y.o. female.     History of Present Illness  The patient presents for evaluation of bronchitis.    She experiences annual bouts of bronchitis each fall. Her symptoms have persisted for approximately 12 to 13 days. Initially, she believed her condition was improving, but she has since developed a cough and swollen, tender glands. Her sore throat has resolved. She reports tenderness in her neck and around her throat. Her cough is not painful, but it is frequent and disrupts her sleep. She experienced chest congestion when lying down at night, but this has improved with medication. She had significant nasal drainage yesterday, but this has also improved with medication. Her mucus is clear.    She has been taking [employer] [facility], which has alleviated her runny nose but not her cough or glandular swelling. She had a fever yesterday, but none today. She has not taken any antibiotics in the past month. Prednisone is typically prescribed for her bronchitis, which usually resolves her symptoms. She took prednisone today and feels fine. She has tolerated prednisone well in the past, with no impact on her blood sugar levels. She tolerates Z-Oliver well.    She reports no shortness of breath or wheezing.    She requests medication for a yeast infection, as she tends to develop one when taking other medications.    ALLERGIES  She is not allergic to any medications.    The following portions of the patient's history were reviewed and updated as appropriate: allergies, current medications, past family history, past medical history, past social history, past surgical history, and problem list.    Review of Systems   Constitutional:  Positive for fever.   HENT:  Positive for congestion, rhinorrhea and sinus pressure.    Respiratory:  Positive for cough. Negative for shortness of breath and wheezing.    Allergic/Immunologic: Positive for environmental allergies.       Objective   Physical  Exam  Constitutional:       General: She is not in acute distress.     Appearance: She is well-developed. She is not diaphoretic.   HENT:      Nose:      Right Sinus: Maxillary sinus tenderness present. No frontal sinus tenderness.      Left Sinus: Maxillary sinus tenderness present. No frontal sinus tenderness.   Pulmonary:      Effort: Pulmonary effort is normal.   Lymphadenopathy:      Head:      Right side of head: Tonsillar adenopathy present.      Left side of head: Tonsillar adenopathy present.   Neurological:      Mental Status: She is alert and oriented to person, place, and time.   Psychiatric:         Behavior: Behavior normal.       Assessment & Plan   Diagnoses and all orders for this visit:    1. Acute non-recurrent maxillary sinusitis (Primary)  -     azithromycin (Zithromax Z-Oliver) 250 MG tablet; Take 2 tablets by mouth on day 1, then 1 tablet daily on days 2-5  Dispense: 6 tablet; Refill: 0  -     methylPREDNISolone (MEDROL) 4 MG dose pack; Take as directed on package instructions.  Dispense: 1 each; Refill: 0    2. Antibiotic-induced yeast infection  -     fluconazole (Diflucan) 150 MG tablet; Take 1 tablet by mouth 1 (One) Time for 1 dose.  Dispense: 1 tablet; Refill: 0      She has been experiencing symptoms for about 12 days, including a cough, swollen and tender glands, and a sore throat that has since resolved. She has also had a fever, sinus tenderness, and nasal drainage. There is no shortness of breath or wheezing, and the mucus is clear. A Medrol Dosepak and Zithromax (Z-Oliver) have been prescribed for sinus infection. Diflucan will be provided to prevent a yeast infection.        Patient or patient representative verbalized consent for the use of Ambient Listening during the visit with  JANES Aguilar for chart documentation. 11/18/2024  18:45 EST    The use of a video visit has been reviewed with the patient and verbal informed consent has been obtained. Myself and Dacia Napier  participated in this visit. The patient is located in  [place], [place] . I am located in [place], [place]. [employer] and [employer] Video Client were utilized. I spent 10 minutes in the patient's chart for this visit.

## 2024-11-18 NOTE — E-VISIT ESCALATED
Status: Referred Out  Date: 2024 17:06:49  Acuity Level: Not applicable  Referral message:  Your health is our priority. Unfortunately, you are currently located in a state where we are not able to treat patients using virtual care. Please contact a local healthcare provider for assistance. If this is an emergency, please   call 911.  You won't be charged for this visit. Thank you for trusting us with your care!   Patient: Dacia Napier  Patient : 1945  Patient Address: [address] [place] [id number]  Patient Phone: [phone]  Clinician Response: Unavailable  Diagnosis: Unavailable  Diagnosis ICD: Unavailable     Patient Interview Questions and Responses: None available

## 2024-11-18 NOTE — TELEPHONE ENCOUNTER
Patient is ill out of town and needs some medication to help her get her through the holiday as she will not be back.    Please call and advise patient as her phone was cutting in and out and she was not clear what is going on.

## 2024-12-10 DIAGNOSIS — E78.00 HIGH CHOLESTEROL: ICD-10-CM

## 2024-12-10 RX ORDER — ESTRADIOL 0.1 MG/G
CREAM VAGINAL
Qty: 42.5 G | Refills: 3 | OUTPATIENT
Start: 2024-12-10

## 2024-12-10 NOTE — TELEPHONE ENCOUNTER
Rx Refill Note  Requested Prescriptions     Pending Prescriptions Disp Refills    rosuvastatin (CRESTOR) 10 MG tablet [Pharmacy Med Name: Rosuvastatin Calcium 10 MG Oral Tablet] 90 tablet 3     Sig: TAKE 1 TABLET BY MOUTH AT NIGHT    estradiol (ESTRACE) 0.1 MG/GM vaginal cream [Pharmacy Med Name: Estradiol 0.1 MG/GM Vaginal Cream] 42.5 g 3     Sig: INSERT 2 GRAMS VAGINALLY TWICE  WEEKLY      Last office visit with prescribing clinician: 7/29/2024   Last telemedicine visit with prescribing clinician: Visit date not found   Next office visit with prescribing clinician: 1/10/2025  Lipid Panel (10/17/2024 09:42)  Comprehensive Metabolic Panel (10/17/2024 09:42)     Estradiol tablet ordered by Dr Mary Saucedo gyn, 12/09/24.  Denied and said to send to her office.     Taylor Adams LPN  12/10/24, 11:48 EST

## 2024-12-11 RX ORDER — ROSUVASTATIN CALCIUM 10 MG/1
10 TABLET, COATED ORAL
Qty: 90 TABLET | Refills: 3 | Status: SHIPPED | OUTPATIENT
Start: 2024-12-11

## 2024-12-20 NOTE — TELEPHONE ENCOUNTER
From: Dacia Napier  To: JANES Betancourt  Sent: 3/11/2022 4:12 PM EST  Subject: Jardiance    The jardiance is $419. Could I come  some samples on Monday?   36.6

## 2025-01-02 DIAGNOSIS — E11.9 TYPE 2 DIABETES MELLITUS WITHOUT COMPLICATION, WITHOUT LONG-TERM CURRENT USE OF INSULIN: ICD-10-CM

## 2025-01-02 RX ORDER — EZETIMIBE 10 MG/1
10 TABLET ORAL DAILY
Qty: 90 TABLET | Refills: 3 | Status: SHIPPED | OUTPATIENT
Start: 2025-01-02

## 2025-01-21 DIAGNOSIS — E11.22 CONTROLLED TYPE 2 DIABETES MELLITUS WITH STAGE 3 CHRONIC KIDNEY DISEASE, WITHOUT LONG-TERM CURRENT USE OF INSULIN: ICD-10-CM

## 2025-01-21 DIAGNOSIS — N18.30 CONTROLLED TYPE 2 DIABETES MELLITUS WITH STAGE 3 CHRONIC KIDNEY DISEASE, WITHOUT LONG-TERM CURRENT USE OF INSULIN: ICD-10-CM

## 2025-01-21 RX ORDER — PANTOPRAZOLE SODIUM 40 MG/1
40 TABLET, DELAYED RELEASE ORAL DAILY
COMMUNITY
End: 2025-01-21 | Stop reason: SDUPTHER

## 2025-01-21 RX ORDER — TIRZEPATIDE 2.5 MG/.5ML
INJECTION, SOLUTION SUBCUTANEOUS
Qty: 6 ML | Refills: 1 | Status: SHIPPED | OUTPATIENT
Start: 2025-01-21

## 2025-01-21 RX ORDER — PANTOPRAZOLE SODIUM 40 MG/1
40 TABLET, DELAYED RELEASE ORAL DAILY
Qty: 30 TABLET | Refills: 0 | Status: SHIPPED | OUTPATIENT
Start: 2025-01-21

## 2025-01-24 ENCOUNTER — OFFICE VISIT (OUTPATIENT)
Dept: FAMILY MEDICINE CLINIC | Age: 80
End: 2025-01-24
Payer: MEDICARE

## 2025-01-24 VITALS
TEMPERATURE: 98.1 F | WEIGHT: 115.6 LBS | SYSTOLIC BLOOD PRESSURE: 102 MMHG | OXYGEN SATURATION: 96 % | HEART RATE: 76 BPM | HEIGHT: 62 IN | BODY MASS INDEX: 21.27 KG/M2 | DIASTOLIC BLOOD PRESSURE: 70 MMHG

## 2025-01-24 DIAGNOSIS — R26.89 POOR BALANCE: ICD-10-CM

## 2025-01-24 DIAGNOSIS — M81.0 POSTMENOPAUSAL OSTEOPOROSIS: ICD-10-CM

## 2025-01-24 DIAGNOSIS — K44.9 HIATAL HERNIA WITH GERD WITHOUT ESOPHAGITIS: ICD-10-CM

## 2025-01-24 DIAGNOSIS — E11.9 TYPE 2 DIABETES MELLITUS TREATED WITHOUT INSULIN: ICD-10-CM

## 2025-01-24 DIAGNOSIS — I10 ESSENTIAL HYPERTENSION: ICD-10-CM

## 2025-01-24 DIAGNOSIS — E78.00 HIGH CHOLESTEROL: ICD-10-CM

## 2025-01-24 DIAGNOSIS — E61.1 LOW SERUM IRON: ICD-10-CM

## 2025-01-24 DIAGNOSIS — Z00.00 MEDICARE ANNUAL WELLNESS VISIT, SUBSEQUENT: Primary | ICD-10-CM

## 2025-01-24 DIAGNOSIS — K21.9 HIATAL HERNIA WITH GERD WITHOUT ESOPHAGITIS: ICD-10-CM

## 2025-01-24 PROBLEM — I25.10 CORONARY ARTERY DISEASE INVOLVING NATIVE CORONARY ARTERY OF NATIVE HEART WITHOUT ANGINA PECTORIS: Status: RESOLVED | Noted: 2024-08-26 | Resolved: 2025-01-24

## 2025-01-24 PROBLEM — R13.19 ESOPHAGEAL DYSPHAGIA: Status: RESOLVED | Noted: 2024-05-15 | Resolved: 2025-01-24

## 2025-01-24 PROBLEM — R00.2 PALPITATIONS: Status: RESOLVED | Noted: 2022-04-13 | Resolved: 2025-01-24

## 2025-01-24 PROCEDURE — 99214 OFFICE O/P EST MOD 30 MIN: CPT | Performed by: FAMILY MEDICINE

## 2025-01-24 PROCEDURE — 1159F MED LIST DOCD IN RCRD: CPT | Performed by: FAMILY MEDICINE

## 2025-01-24 PROCEDURE — 1160F RVW MEDS BY RX/DR IN RCRD: CPT | Performed by: FAMILY MEDICINE

## 2025-01-24 PROCEDURE — 3074F SYST BP LT 130 MM HG: CPT | Performed by: FAMILY MEDICINE

## 2025-01-24 PROCEDURE — G2211 COMPLEX E/M VISIT ADD ON: HCPCS | Performed by: FAMILY MEDICINE

## 2025-01-24 PROCEDURE — G0439 PPPS, SUBSEQ VISIT: HCPCS | Performed by: FAMILY MEDICINE

## 2025-01-24 PROCEDURE — 1170F FXNL STATUS ASSESSED: CPT | Performed by: FAMILY MEDICINE

## 2025-01-24 PROCEDURE — 1126F AMNT PAIN NOTED NONE PRSNT: CPT | Performed by: FAMILY MEDICINE

## 2025-01-24 PROCEDURE — 3078F DIAST BP <80 MM HG: CPT | Performed by: FAMILY MEDICINE

## 2025-01-24 NOTE — ASSESSMENT & PLAN NOTE
Diabetes is stable.   Continue current treatment regimen.  Diabetes will be reassessed in 6 months    Orders:    Comprehensive Metabolic Panel; Future    Hemoglobin A1c; Future

## 2025-01-24 NOTE — ASSESSMENT & PLAN NOTE
Hypertension is stable and controlled  Continue current treatment regimen.  Blood pressure will be reassessed in 6 months.    Orders:    TSH Rfx On Abnormal To Free T4; Future

## 2025-01-24 NOTE — PROGRESS NOTES
Subjective   The ABCs of the Annual Wellness Visit  Medicare Wellness Visit      Dacia Napier is a 79 y.o. patient who presents for a Medicare Wellness Visit.    The following portions of the patient's history were reviewed and   updated as appropriate: allergies, current medications, past family history, past medical history, past social history, past surgical history, and problem list.    Compared to one year ago, the patient's physical   health is the same.  Compared to one year ago, the patient's mental   health is the same.    Recent Hospitalizations:  She was not admitted to the hospital during the last year.     Current Medical Providers:  Patient Care Team:  Mata Love MD as PCP - General (Family Medicine)  Rory Paredes MD as Consulting Physician (Urology)  Annabelle Buckley APRN as Nurse Practitioner (Endocrinology)  Nohemi Brunson RegSched Rep as Medical Assistant  Efrem Ventura MD as Consulting Physician (Cardiology)  Tessa Rg MD as Consulting Physician (Nephrology)  Chrissie Katz MD as Consulting Physician (Urology)  Johan De Jesus MD as Consulting Physician (Pulmonary Disease)  Rod Olvera MD as Consulting Physician (Gastroenterology)    Outpatient Medications Prior to Visit   Medication Sig Dispense Refill    Accu-Chek Softclix Lancets lancets USE 1 LANCET TWICE DAILY 100 each 3    aspirin 81 MG EC tablet Take 1 tablet by mouth Daily. LAST DOSE 08/18/23 PT STOPPED ON OWN      Calcium Carbonate 1500 (600 Ca) MG tablet 2 (Two) Times a Day With Meals.      cetirizine (zyrTEC) 10 MG tablet Take 1 tablet by mouth Daily As Needed for Allergies. 90 tablet 3    Cholecalciferol (Vitamin D3) 50 MCG (2000 UT) tablet Take 1 tablet by mouth Daily.      Collagen-Boron-Hyaluronic Acid (MOVE FREE ULTRA JOINT HEALTH PO) Take  by mouth.      ezetimibe (ZETIA) 10 MG tablet TAKE 1 TABLET BY MOUTH DAILY 90 tablet 3    glucose blood (Accu-Chek Guide) test strip TEST  TWICE DAILY 200 each 3    Omega-3 1000 MG capsule Take 1 capsule by mouth Daily.      pantoprazole (PROTONIX) 40 MG EC tablet Take 1 tablet by mouth Daily. Please notify patient to get from primary care next time, patient has no follow-up here 30 tablet 0    rosuvastatin (CRESTOR) 10 MG tablet TAKE 1 TABLET BY MOUTH AT NIGHT 90 tablet 3    Tirzepatide (Mounjaro) 2.5 MG/0.5ML solution auto-injector INJECT THE CONTENTS OF ONE PEN  SUBCUTANEOUSLY WEEKLY AS  DIRECTED INTO THE APPROPRIATE  AREA 6 mL 1    azithromycin (Zithromax Z-Oliver) 250 MG tablet Take 2 tablets by mouth on day 1, then 1 tablet daily on days 2-5 6 tablet 0    methylPREDNISolone (MEDROL) 4 MG dose pack Take as directed on package instructions. 1 each 0     No facility-administered medications prior to visit.     No opioid medication identified on active medication list. I have reviewed chart for other potential  high risk medication/s and harmful drug interactions in the elderly.      Aspirin is on active medication list. Aspirin use is indicated based on review of current medical condition/s. Pros and cons of this therapy have been discussed today. Benefits of this medication outweigh potential harm.  Patient has been encouraged to continue taking this medication.  .      Patient Active Problem List   Diagnosis    Postmenopausal atrophic vaginitis    Agenesis of right kidney    Type 2 diabetes mellitus treated without insulin    Hiatal hernia with GERD without esophagitis    High cholesterol    Postmenopausal osteoporosis    Recurrent urinary tract infection    Other allergic rhinitis    Heart murmur (neg ECHO in 2024)    Stage 3a chronic kidney disease    Medicare annual wellness visit, subsequent    Urgency incontinence    Recurrent cold sores    Low serum iron    Intrinsic sphincter deficiency    History of colon polyps (last scope was in 2024)    Chronic idiopathic constipation    Essential hypertension    Paroxysmal SVT (supraventricular  "tachycardia)     Advance Care Planning Advance Directive is not on file.  ACP discussion was held with the patient during this visit. Patient has an advance directive (not in EMR), copy requested.            Objective   Vitals:    25 1355   BP: 102/70   BP Location: Left arm   Patient Position: Sitting   Pulse: 76   Temp: 98.1 °F (36.7 °C)   TempSrc: Oral   SpO2: 96%  Comment: on room air   Weight: 52.4 kg (115 lb 9.6 oz)   Height: 157.5 cm (62\")   PainSc: 0-No pain       Estimated body mass index is 21.14 kg/m² as calculated from the following:    Height as of this encounter: 157.5 cm (62\").    Weight as of this encounter: 52.4 kg (115 lb 9.6 oz).    BMI is within normal parameters. No other follow-up for BMI required.           Does the patient have evidence of cognitive impairment? No                                                                                                Health  Risk Assessment    Smoking Status:  Social History     Tobacco Use   Smoking Status Never    Passive exposure: Past   Smokeless Tobacco Never     Alcohol Consumption:  Social History     Substance and Sexual Activity   Alcohol Use Not Currently    Comment: Hardly ever       Fall Risk Screen  STEADI Fall Risk Assessment was completed, and patient is at LOW risk for falls.Assessment completed on:2025    Depression Screening   Little interest or pleasure in doing things? Not at all   Feeling down, depressed, or hopeless? Not at all   PHQ-2 Total Score 0      Health Habits and Functional and Cognitive Screenin/24/2025     1:59 PM   Functional & Cognitive Status   Do you have difficulty preparing food and eating? No   Do you have difficulty bathing yourself, getting dressed or grooming yourself? No   Do you have difficulty using the toilet? No   Do you have difficulty moving around from place to place? No   Do you have trouble with steps or getting out of a bed or a chair? No   Current Diet Well Balanced Diet "   Dental Exam Up to date   Eye Exam Up to date   Exercise (times per week) 3 times per week   Current Exercises Include Walking   Do you need help using the phone?  No   Are you deaf or do you have serious difficulty hearing?  No   Do you need help to go to places out of walking distance? No   Do you need help shopping? No   Do you need help preparing meals?  No   Do you need help with housework?  No   Do you need help with laundry? No   Do you need help taking your medications? No   Do you need help managing money? No   Do you ever drive or ride in a car without wearing a seat belt? No   Have you felt unusual stress, anger or loneliness in the last month? No   Who do you live with? Alone   If you need help, do you have trouble finding someone available to you? No   Have you been bothered in the last four weeks by sexual problems? No   Do you have difficulty concentrating, remembering or making decisions? No           Age-appropriate Screening Schedule:  Refer to the list below for future screening recommendations based on patient's age, sex and/or medical conditions. Orders for these recommended tests are listed in the plan section. The patient has been provided with a written plan.    Health Maintenance List  Health Maintenance   Topic Date Due    DIABETIC FOOT EXAM  Never done    TDAP/TD VACCINES (2 - Td or Tdap) 10/01/2022    COVID-19 Vaccine (2 - 2024-25 season) 09/01/2024    DIABETIC EYE EXAM  11/17/2024    HEMOGLOBIN A1C  04/17/2025    RSV Vaccine - Adults (1 - 1-dose 75+ series) 07/29/2025 (Originally 10/24/2020)    URINE MICROALBUMIN  06/25/2025    DXA SCAN  07/17/2025    LIPID PANEL  10/17/2025    ANNUAL WELLNESS VISIT  01/24/2026    COLORECTAL CANCER SCREENING  09/19/2029    HEPATITIS C SCREENING  Completed    INFLUENZA VACCINE  Completed    Pneumococcal Vaccine 65+  Completed    ZOSTER VACCINE  Completed                                                                                                        "                                         CMS Preventative Services Quick Reference  Risk Factors Identified During Encounter  None Identified    The above risks/problems have been discussed with the patient.  Pertinent information has been shared with the patient in the After Visit Summary.  An After Visit Summary and PPPS were made available to the patient.    Follow Up:   Next Medicare Wellness visit to be scheduled in 1 year.         Additional E&M Note during same encounter follows:  Patient has additional, significant, and separately identifiable condition(s)/problem(s) that require work above and beyond the Medicare Wellness Visit     Chief Complaint  Medicare Wellness-subsequent    Subjective   --TOLERATING BLOOD PRESSURE MEDICATION WITHOUT APPARENT SIDE EFFECTS  --LAST LIPIDS WEE OK, NO ISSUES WITH MEDS  --GERD IS WELL CONTROLLED WITH THE PPI  --LAST HGA1C WAS > 7 % WITH CURRENT MEDS  --POOR BALANCE LATELY, WOULD LIKE TO GO BACK TO P.T FOR THIS  --DUE FOR SOME ROUTINE LABS AND A F/U DEXA          Review of Systems   Constitutional:  Negative for activity change, appetite change, chills, fatigue and fever.   HENT:  Negative for congestion, ear pain, hearing loss, rhinorrhea and sore throat.    Eyes:  Negative for discharge and visual disturbance.   Respiratory:  Negative for cough and shortness of breath.    Cardiovascular:  Negative for chest pain, palpitations and leg swelling.   Gastrointestinal:  Negative for abdominal pain, diarrhea, nausea and vomiting.   Genitourinary:  Negative for dysuria and hematuria.   Musculoskeletal:  Positive for arthralgias. Negative for myalgias.   Psychiatric/Behavioral:  Negative for dysphoric mood.               Objective   Vital Signs:  /70 (BP Location: Left arm, Patient Position: Sitting)   Pulse 76   Temp 98.1 °F (36.7 °C) (Oral)   Ht 157.5 cm (62\")   Wt 52.4 kg (115 lb 9.6 oz)   SpO2 96% Comment: on room air  BMI 21.14 kg/m²   Physical Exam  Vitals and " nursing note reviewed.   Constitutional:       General: She is not in acute distress.     Appearance: Normal appearance.   HENT:      Right Ear: Tympanic membrane normal.      Left Ear: Tympanic membrane normal.      Mouth/Throat:      Pharynx: Oropharynx is clear.   Eyes:      Conjunctiva/sclera: Conjunctivae normal.   Cardiovascular:      Rate and Rhythm: Normal rate and regular rhythm.      Heart sounds: Normal heart sounds. No murmur heard.  Pulmonary:      Effort: Pulmonary effort is normal.      Breath sounds: Normal breath sounds.   Abdominal:      General: Bowel sounds are normal.      Palpations: Abdomen is soft.      Tenderness: There is no abdominal tenderness.   Musculoskeletal:      Cervical back: Neck supple.      Right lower leg: No edema.      Left lower leg: No edema.   Lymphadenopathy:      Cervical: No cervical adenopathy.   Neurological:      General: No focal deficit present.      Mental Status: She is alert.      Cranial Nerves: No cranial nerve deficit.      Coordination: Coordination normal.      Gait: Gait normal.   Psychiatric:         Mood and Affect: Mood normal.         Behavior: Behavior normal.                       Assessment and Plan            Essential hypertension  Hypertension is stable and controlled  Continue current treatment regimen.  Blood pressure will be reassessed in 6 months.    Orders:    TSH Rfx On Abnormal To Free T4; Future    Hiatal hernia with GERD without esophagitis  IMPROVED WITH CURRENT TREATMENT, WILL REEVALUATE AT NEXT VISIT     Orders:    CBC (No Diff); Future    High cholesterol   Lipid abnormalities are stable    Plan:  Continue same medication/s without change.      Discussed medication dosage, use, side effects, and goals of treatment in detail.    Counseled patient on lifestyle modifications to help control hyperlipidemia.     Patient Treatment Goals:   LDL goal is less than 70    Followup in 6 months.    Orders:    Lipid Panel; Future    Low serum  iron    Orders:    Iron Profile; Future    Postmenopausal osteoporosis    Orders:    DEXA Bone Density Axial; Future    Type 2 diabetes mellitus treated without insulin  Diabetes is stable.   Continue current treatment regimen.  Diabetes will be reassessed in 6 months    Orders:    Comprehensive Metabolic Panel; Future    Hemoglobin A1c; Future    Medicare annual wellness visit, subsequent         Poor balance    Orders:    Ambulatory Referral to Physical Therapy for Evaluation & Treatment            Follow Up   Return in about 6 months (around 7/24/2025).  Patient was given instructions and counseling regarding her condition or for health maintenance advice. Please see specific information pulled into the AVS if appropriate.

## 2025-01-24 NOTE — ASSESSMENT & PLAN NOTE
ADVICE GIVEN RE:  SEATBELT USE, ALCOHOL USE, HEALTHY DIET, ROUTINE EYE AND DENTAL EXAM, ROUTINE VACCINATIONS.    SHE WILL CONSIDER AN RSV VACCINE

## 2025-01-24 NOTE — ASSESSMENT & PLAN NOTE
Lipid abnormalities are stable    Plan:  Continue same medication/s without change.      Discussed medication dosage, use, side effects, and goals of treatment in detail.    Counseled patient on lifestyle modifications to help control hyperlipidemia.     Patient Treatment Goals:   LDL goal is less than 70    Followup in 6 months.    Orders:    Lipid Panel; Future

## 2025-01-24 NOTE — ASSESSMENT & PLAN NOTE
IMPROVED WITH CURRENT TREATMENT, WILL REEVALUATE AT NEXT VISIT     Orders:    CBC (No Diff); Future

## 2025-01-27 DIAGNOSIS — K44.9 HIATAL HERNIA WITH GERD WITHOUT ESOPHAGITIS: Primary | ICD-10-CM

## 2025-01-27 DIAGNOSIS — K21.9 HIATAL HERNIA WITH GERD WITHOUT ESOPHAGITIS: Primary | ICD-10-CM

## 2025-01-27 NOTE — TELEPHONE ENCOUNTER
Rx Refill Note  Requested Prescriptions     Pending Prescriptions Disp Refills    pantoprazole (PROTONIX) 40 MG EC tablet 30 tablet 0     Sig: Take 1 tablet by mouth Daily. Please notify patient to get from primary care next time, patient has no follow-up here      Last office visit with prescribing clinician: 1/24/2025   Last telemedicine visit with prescribing clinician: Visit date not found   Next office visit with prescribing clinician: 7/25/2025    Dacia MEJIA [facility] (supporting Mata Love MD)11 hours ago (9:08 PM)       Please fill the prescription Pantoprazole. The internist asked that you prescribe. Please send to mail order for 3 months prescription.       Taylor Adams LPN  01/27/25, 09:07 EST

## 2025-01-30 DIAGNOSIS — E11.9 TYPE 2 DIABETES MELLITUS WITHOUT COMPLICATION, WITHOUT LONG-TERM CURRENT USE OF INSULIN: ICD-10-CM

## 2025-01-30 RX ORDER — LANCETS
EACH MISCELLANEOUS
Qty: 200 EACH | Refills: 3 | Status: SHIPPED | OUTPATIENT
Start: 2025-01-30

## 2025-01-30 RX ORDER — PANTOPRAZOLE SODIUM 40 MG/1
40 TABLET, DELAYED RELEASE ORAL DAILY
Qty: 90 TABLET | Refills: 1 | Status: SHIPPED | OUTPATIENT
Start: 2025-01-30

## 2025-01-30 NOTE — TELEPHONE ENCOUNTER
Pt requested refill on Lancets and test strips- test strips were refill on 1-21-25. Lancets being refilled to pt preferred pharmacy

## 2025-01-31 ENCOUNTER — LAB (OUTPATIENT)
Dept: LAB | Facility: HOSPITAL | Age: 80
End: 2025-01-31
Payer: MEDICARE

## 2025-01-31 ENCOUNTER — TRANSCRIBE ORDERS (OUTPATIENT)
Dept: ADMINISTRATIVE | Facility: HOSPITAL | Age: 80
End: 2025-01-31
Payer: MEDICARE

## 2025-01-31 DIAGNOSIS — I10 HYPERTENSION, ESSENTIAL: ICD-10-CM

## 2025-01-31 DIAGNOSIS — N18.30 STAGE 3 CHRONIC KIDNEY DISEASE, UNSPECIFIED WHETHER STAGE 3A OR 3B CKD: Primary | ICD-10-CM

## 2025-01-31 DIAGNOSIS — K21.9 HIATAL HERNIA WITH GERD WITHOUT ESOPHAGITIS: ICD-10-CM

## 2025-01-31 DIAGNOSIS — N18.30 STAGE 3 CHRONIC KIDNEY DISEASE, UNSPECIFIED WHETHER STAGE 3A OR 3B CKD: ICD-10-CM

## 2025-01-31 DIAGNOSIS — I10 ESSENTIAL HYPERTENSION: ICD-10-CM

## 2025-01-31 DIAGNOSIS — E78.00 HIGH CHOLESTEROL: ICD-10-CM

## 2025-01-31 DIAGNOSIS — E11.9 TYPE 2 DIABETES MELLITUS TREATED WITHOUT INSULIN: ICD-10-CM

## 2025-01-31 DIAGNOSIS — E61.1 LOW SERUM IRON: ICD-10-CM

## 2025-01-31 DIAGNOSIS — K44.9 HIATAL HERNIA WITH GERD WITHOUT ESOPHAGITIS: ICD-10-CM

## 2025-01-31 LAB
ALBUMIN SERPL-MCNC: 4.1 G/DL (ref 3.5–5.2)
ALBUMIN UR-MCNC: 1.7 MG/DL
ALBUMIN/GLOB SERPL: 1.6 G/DL
ALP SERPL-CCNC: 91 U/L (ref 39–117)
ALT SERPL W P-5'-P-CCNC: 18 U/L (ref 1–33)
ANION GAP SERPL CALCULATED.3IONS-SCNC: 8 MMOL/L (ref 5–15)
AST SERPL-CCNC: 25 U/L (ref 1–32)
BACTERIA UR QL AUTO: ABNORMAL /HPF
BASOPHILS # BLD AUTO: 0.03 10*3/MM3 (ref 0–0.2)
BASOPHILS NFR BLD AUTO: 0.5 % (ref 0–1.5)
BILIRUB SERPL-MCNC: 0.7 MG/DL (ref 0–1.2)
BILIRUB UR QL STRIP: NEGATIVE
BUN SERPL-MCNC: 14 MG/DL (ref 8–23)
BUN/CREAT SERPL: 12.5 (ref 7–25)
CALCIUM SPEC-SCNC: 9.3 MG/DL (ref 8.6–10.5)
CHLORIDE SERPL-SCNC: 103 MMOL/L (ref 98–107)
CHOLEST SERPL-MCNC: 124 MG/DL (ref 0–200)
CLARITY UR: CLEAR
CO2 SERPL-SCNC: 27 MMOL/L (ref 22–29)
COLOR UR: YELLOW
CREAT SERPL-MCNC: 1.12 MG/DL (ref 0.57–1)
CREAT UR-MCNC: 204.4 MG/DL
CREAT UR-MCNC: 213.9 MG/DL
DEPRECATED RDW RBC AUTO: 44.7 FL (ref 37–54)
EGFRCR SERPLBLD CKD-EPI 2021: 50.1 ML/MIN/1.73
EOSINOPHIL # BLD AUTO: 0.15 10*3/MM3 (ref 0–0.4)
EOSINOPHIL NFR BLD AUTO: 2.7 % (ref 0.3–6.2)
ERYTHROCYTE [DISTWIDTH] IN BLOOD BY AUTOMATED COUNT: 13.1 % (ref 12.3–15.4)
GLOBULIN UR ELPH-MCNC: 2.5 GM/DL
GLUCOSE SERPL-MCNC: 90 MG/DL (ref 65–99)
GLUCOSE UR STRIP-MCNC: NEGATIVE MG/DL
HBA1C MFR BLD: 5.8 % (ref 4.8–5.6)
HCT VFR BLD AUTO: 42.5 % (ref 34–46.6)
HDLC SERPL-MCNC: 51 MG/DL (ref 40–60)
HGB BLD-MCNC: 13.4 G/DL (ref 12–15.9)
HGB UR QL STRIP.AUTO: ABNORMAL
IMM GRANULOCYTES # BLD AUTO: 0.01 10*3/MM3 (ref 0–0.05)
IMM GRANULOCYTES NFR BLD AUTO: 0.2 % (ref 0–0.5)
IRON 24H UR-MRATE: 82 MCG/DL (ref 37–145)
IRON SATN MFR SERPL: 21 % (ref 20–50)
KETONES UR QL STRIP: NEGATIVE
LDLC SERPL CALC-MCNC: 55 MG/DL (ref 0–100)
LDLC/HDLC SERPL: 1.07 {RATIO}
LEUKOCYTE ESTERASE UR QL STRIP.AUTO: NEGATIVE
LYMPHOCYTES # BLD AUTO: 1.35 10*3/MM3 (ref 0.7–3.1)
LYMPHOCYTES NFR BLD AUTO: 24 % (ref 19.6–45.3)
MCH RBC QN AUTO: 29 PG (ref 26.6–33)
MCHC RBC AUTO-ENTMCNC: 31.5 G/DL (ref 31.5–35.7)
MCV RBC AUTO: 92 FL (ref 79–97)
MICROALBUMIN/CREAT UR: 8.3 MG/G (ref 0–29)
MONOCYTES # BLD AUTO: 0.52 10*3/MM3 (ref 0.1–0.9)
MONOCYTES NFR BLD AUTO: 9.3 % (ref 5–12)
MUCOUS THREADS URNS QL MICRO: ABNORMAL /HPF
NEUTROPHILS NFR BLD AUTO: 3.56 10*3/MM3 (ref 1.7–7)
NEUTROPHILS NFR BLD AUTO: 63.3 % (ref 42.7–76)
NITRITE UR QL STRIP: NEGATIVE
PH UR STRIP.AUTO: 6 [PH] (ref 5–8)
PLATELET # BLD AUTO: 254 10*3/MM3 (ref 140–450)
PMV BLD AUTO: 10 FL (ref 6–12)
POTASSIUM SERPL-SCNC: 4.3 MMOL/L (ref 3.5–5.2)
PROT ?TM UR-MCNC: 15.3 MG/DL
PROT SERPL-MCNC: 6.6 G/DL (ref 6–8.5)
PROT UR QL STRIP: NEGATIVE
PROT/CREAT UR: 0.07 MG/G{CREAT}
RBC # BLD AUTO: 4.62 10*6/MM3 (ref 3.77–5.28)
RBC # UR STRIP: ABNORMAL /HPF
REF LAB TEST METHOD: ABNORMAL
SODIUM SERPL-SCNC: 138 MMOL/L (ref 136–145)
SP GR UR STRIP: 1.02 (ref 1–1.03)
SQUAMOUS #/AREA URNS HPF: ABNORMAL /HPF
T4 FREE SERPL-MCNC: 1.15 NG/DL (ref 0.92–1.68)
TIBC SERPL-MCNC: 395 MCG/DL (ref 298–536)
TRANSFERRIN SERPL-MCNC: 265 MG/DL (ref 200–360)
TRIGL SERPL-MCNC: 93 MG/DL (ref 0–150)
TSH SERPL DL<=0.05 MIU/L-ACNC: 4.27 UIU/ML (ref 0.27–4.2)
UROBILINOGEN UR QL STRIP: ABNORMAL
VLDLC SERPL-MCNC: 18 MG/DL (ref 5–40)
WBC # UR STRIP: ABNORMAL /HPF
WBC NRBC COR # BLD AUTO: 5.62 10*3/MM3 (ref 3.4–10.8)

## 2025-01-31 PROCEDURE — 80061 LIPID PANEL: CPT

## 2025-01-31 PROCEDURE — 83036 HEMOGLOBIN GLYCOSYLATED A1C: CPT

## 2025-01-31 PROCEDURE — 84156 ASSAY OF PROTEIN URINE: CPT

## 2025-01-31 PROCEDURE — 83540 ASSAY OF IRON: CPT

## 2025-01-31 PROCEDURE — 84439 ASSAY OF FREE THYROXINE: CPT

## 2025-01-31 PROCEDURE — 80053 COMPREHEN METABOLIC PANEL: CPT

## 2025-01-31 PROCEDURE — 85025 COMPLETE CBC W/AUTO DIFF WBC: CPT

## 2025-01-31 PROCEDURE — 36415 COLL VENOUS BLD VENIPUNCTURE: CPT

## 2025-01-31 PROCEDURE — 82570 ASSAY OF URINE CREATININE: CPT

## 2025-01-31 PROCEDURE — 81001 URINALYSIS AUTO W/SCOPE: CPT

## 2025-01-31 PROCEDURE — 84466 ASSAY OF TRANSFERRIN: CPT

## 2025-01-31 PROCEDURE — 84443 ASSAY THYROID STIM HORMONE: CPT

## 2025-01-31 PROCEDURE — 82043 UR ALBUMIN QUANTITATIVE: CPT

## 2025-02-04 ENCOUNTER — OFFICE VISIT (OUTPATIENT)
Dept: CARDIOLOGY | Facility: CLINIC | Age: 80
End: 2025-02-04
Payer: MEDICARE

## 2025-02-04 VITALS
DIASTOLIC BLOOD PRESSURE: 72 MMHG | SYSTOLIC BLOOD PRESSURE: 107 MMHG | WEIGHT: 119.8 LBS | HEART RATE: 87 BPM | HEIGHT: 62 IN | BODY MASS INDEX: 22.05 KG/M2

## 2025-02-04 DIAGNOSIS — E78.2 MIXED DYSLIPIDEMIA: ICD-10-CM

## 2025-02-04 DIAGNOSIS — I25.10 CORONARY ARTERY DISEASE INVOLVING NATIVE CORONARY ARTERY OF NATIVE HEART WITHOUT ANGINA PECTORIS: Primary | ICD-10-CM

## 2025-02-04 DIAGNOSIS — I47.10 PAROXYSMAL SVT (SUPRAVENTRICULAR TACHYCARDIA): ICD-10-CM

## 2025-02-04 DIAGNOSIS — I38 MILD VALVULAR HEART DISEASE: ICD-10-CM

## 2025-02-04 DIAGNOSIS — I10 ESSENTIAL HYPERTENSION: ICD-10-CM

## 2025-02-04 NOTE — PROGRESS NOTES
Chief Complaint  Coronary Artery Disease    Subjective            Dacia Napier presents to [facility]  History of Present Illness    Dacia is here for follow-up evaluation management of coronary artery disease evidenced on CT imaging, paroxysmal SVT, palpitations, mixed hyperlipidemia.  Since her last visit she is overall doing well from a cardiac perspective.  She denies chest pain or excessive shortness of breath.  Palpitations are controlled.  She is inquiring about updating echocardiogram.  Echo 1 year ago showed normal LV systolic function, mild mitral valve regurgitation and mild aortic valve regurgitation.    PMH  Past Medical History:   Diagnosis Date    Anemia 06/2021    Iron prescription    Arrhythmia     Bronchitis     Cataract 2019    Surgery    CHF (congestive heart failure)     Chronic kidney disease     Colon polyp     Coronary artery disease involving native coronary artery of native heart without angina pectoris 08/26/2024    Diabetes mellitus     Diverticulitis of colon     Diverticulosis 2012    Esophageal varices     Essential hypertension 08/26/2024    GERD (gastroesophageal reflux disease)     Hiatal hernia     Hyperlipidemia     Lung nodule 7/2022    On my ct scan at [facility]    Osteopenia 2019    Taking prolia/stopped in 2023    Other allergic rhinitis 01/06/2022    Rapid or irregular heartbeat     Renal insufficiency     Have only one working kidney. The other one is very small an    Seasonal allergies     Type 2 diabetes mellitus     PRE DM    Urinary tract infection 5/12/23    Reoccurring         SURGICALHX  Past Surgical History:   Procedure Laterality Date    CARDIAC CATHETERIZATION      CATARACT EXTRACTION, BILATERAL      COLONOSCOPY      COLONOSCOPY N/A 06/25/2021    POLYP    COLONOSCOPY N/A 09/19/2024    POLYPS    CYSTOSCOPY W/ BULKING AGENT INJECTION N/A 08/25/2023    Procedure: CYSTOSCOPY WITH BULKING AGENT INJECTION;  Surgeon: Chrissie Katz,  MD;  Location: [facility] MAIN OR;  Service: Urology;  Laterality: N/A;    ENDOSCOPY N/A 2021    Procedure: ESOPHAGOGASTRODUODENOSCOPY;  Surgeon: Ty Partida MD;  Location: [facility] ENDOSCOPY;  Service: General;  Laterality: N/A;  Hiatal Hernia    ENDOSCOPY N/A 2024    Procedure: ESOPHAGOGASTRODUODENOSCOPY WITH BIOPSIES AND BALLON DILATATION 18-20(DILATED TO 20);  Surgeon: Rod Olvera MD;  Location: [facility] ENDOSCOPY;  Service: Gastroenterology;  Laterality: N/A;  HIATAL HERNIA, SCHATSKIS RING    EYE CAPSULOTOMY WITH LASER      [facility]   [facility]    HERNIA REPAIR      HYSTERECTOMY      LASIK      SUBTOTAL HYSTERECTOMY      Did not remove uterus    TONSILLECTOMY          SOC  Social History     Socioeconomic History    Marital status:    Tobacco Use    Smoking status: Never     Passive exposure: Past    Smokeless tobacco: Never   Vaping Use    Vaping status: Never Used   Substance and Sexual Activity    Alcohol use: Not Currently     Comment: Hardly ever    Drug use: Never    Sexual activity: Not Currently     Partners: Male     Birth control/protection: Condom, Diaphragm, Spermicide, Pill         FAMHX  Family History   Problem Relation Age of Onset    Heart disease Mother          of massive heart attach at age 66    Heart failure Mother         Congestive heart failure    Miscarriages / Stillbirths Mother         2 miscarriages    Heart disease Father         Had 3 heart attacks and  on  at age 79    Heart failure Father         3 heart attacks in 6 years    Heart disease Sister         Sister with heart problems    Cancer Sister         Breast cancer    Cancer Sister         Melanoma skin cancer    Heart disease Sister         Pace maker    Heart failure Sister         5 by pass    Heart disease Sister     Cancer Sister     Heart disease Brother         Stents  of heart attack    Cancer Brother         Tongue  "cancer    Heart failure Brother         Pace maker defibrillator and stents at 40    Heart failure Brother         By pass    Heart disease Brother         Heart problems    Cancer Brother     Heart failure Brother     Miscarriages / Stillbirths Sister     Alice Hyperthermia Neg Hx     Colon cancer Neg Hx           ALLERGY  No Known Allergies     MEDSCURRENT    Current Outpatient Medications:     Accu-Chek Softclix Lancets lancets, USE 1 LANCET TWICE DAILY, Disp: 200 each, Rfl: 3    aspirin 81 MG EC tablet, Take 1 tablet by mouth Daily. LAST DOSE 08/18/23 PT STOPPED ON OWN, Disp: , Rfl:     Calcium Carbonate 1500 (600 Ca) MG tablet, 2 (Two) Times a Day With Meals., Disp: , Rfl:     cetirizine (zyrTEC) 10 MG tablet, Take 1 tablet by mouth Daily As Needed for Allergies., Disp: 90 tablet, Rfl: 3    Cholecalciferol (Vitamin D3) 50 MCG (2000 UT) tablet, Take 1 tablet by mouth Daily., Disp: , Rfl:     Collagen-Boron-Hyaluronic Acid ([employer] HEALTH PO), Take  by mouth., Disp: , Rfl:     ezetimibe (ZETIA) 10 MG tablet, TAKE 1 TABLET BY MOUTH DAILY, Disp: 90 tablet, Rfl: 3    glucose blood (Accu-Chek Guide) test strip, TEST TWICE DAILY, Disp: 200 each, Rfl: 3    Omega-3 1000 MG capsule, Take 1 capsule by mouth Daily., Disp: , Rfl:     pantoprazole (PROTONIX) 40 MG EC tablet, Take 1 tablet by mouth Daily. Please notify patient to get from primary care next time, patient has no follow-up here, Disp: 90 tablet, Rfl: 1    rosuvastatin (CRESTOR) 10 MG tablet, TAKE 1 TABLET BY MOUTH AT NIGHT, Disp: 90 tablet, Rfl: 3    Tirzepatide (Mounjaro) 2.5 MG/0.5ML solution auto-injector, INJECT THE CONTENTS OF ONE PEN  SUBCUTANEOUSLY WEEKLY AS  DIRECTED INTO THE APPROPRIATE  AREA, Disp: 6 mL, Rfl: 1      Review of Systems   Cardiovascular:  Negative for chest pain, dyspnea on exertion and palpitations.        Objective     /72   Pulse 87   Ht 157.5 cm (62\")   Wt 54.3 kg (119 lb 12.8 oz)   BMI 21.91 kg/m²   "     General Appearance:   well developed  well nourished  HENT:   oropharynx moist  lips not cyanotic  Neck:  thyroid not enlarged  supple  Respiratory:  no respiratory distress  normal breath sounds  no rales  Cardiovascular:  no jugular venous distention  regular rhythm, occasional ectopy  apical impulse normal  S1 normal, S2 normal  no S3, no S4   Soft systolic murmur  no rub, no thrill  carotid pulses normal; no bruit  pedal pulses normal  lower extremity edema: none    Musculoskeletal:  no clubbing of fingers.   normocephalic, head atraumatic  Skin:   warm, dry  Psychiatric:  judgement and insight appropriate  normal mood and affect      Result Review :     The following data was reviewed by: JANES Gonzalez on 02/04/2025:    CMP          6/25/2024    10:58 10/17/2024    09:42 1/31/2025    09:40   CMP   Glucose 100  107  90    BUN 14  14  14    Creatinine 1.01  0.96  1.12    EGFR 57.1  60.7  50.1    Sodium 139  142  138    Potassium 4.5  4.6  4.3    Chloride 103  106  103    Calcium 9.4  9.3  9.3    Total Protein 6.6  6.6  6.6    Albumin 4.2  3.9  4.1    Globulin 2.4  2.7  2.5    Total Bilirubin 0.6  0.7  0.7    Alkaline Phosphatase 118  115  91    AST (SGOT) 25  19  25    ALT (SGPT) 15  10  18    Albumin/Globulin Ratio 1.8  1.4  1.6    BUN/Creatinine Ratio 13.9  14.6  12.5    Anion Gap 9.7  8.2  8.0      CBC          6/25/2024    10:58 1/31/2025    09:40   CBC   WBC 6.35  5.62    RBC 4.52  4.62    Hemoglobin 13.1  13.4    Hematocrit 42.2  42.5    MCV 93.4  92.0    MCH 29.0  29.0    MCHC 31.0  31.5    RDW 12.7  13.1    Platelets 216  254      Lipid Panel          6/25/2024    10:58 10/17/2024    09:42 1/31/2025    09:40   Lipid Panel   Total Cholesterol 145  174  124    Triglycerides 109  140  93    HDL Cholesterol 50  48  51    VLDL Cholesterol 20  25  18    LDL Cholesterol  75  101  55    LDL/HDL Ratio 1.46  2.04  1.07      TSH          10/17/2024    09:42 1/31/2025    09:40   TSH   TSH 3.600   4.270             Procedures      Dacia Napier  reports that she has never smoked. She has been exposed to tobacco smoke. She has never used smokeless tobacco. I have educated her on the risk of diseases from using tobacco products such as cancer, COPD, and heart disease.              Assessment and Plan        ASSESSMENT:  Encounter Diagnoses   Name Primary?    Coronary artery disease involving native coronary artery of native heart without angina pectoris Yes    Essential hypertension     Mixed dyslipidemia     Paroxysmal SVT (supraventricular tachycardia)     Mild valvular heart disease          PLAN:    1.  Coronary artery disease evidenced on CT imaging, clinically stable without angina.  Continue aspirin and statin therapy.  2.  Mixed hyperlipidemia stable on statin therapy, continue.  3.  Essential hypertension controlled, continue current medical therapy.  4.  Paroxysmal SVT, palpitations are stable.  5.  Mild valvular heart disease, mild mitral and aortic regurgitation.  Will update echocardiogram.    Routine follow-up in 6 months.      Patient was given instructions and counseling regarding her condition or for health maintenance advice. Please see specific information pulled into the AVS if appropriate.           Layne Wilkinson, APRN   2/4/2025  11:47 EST

## 2025-02-12 DIAGNOSIS — K44.9 HIATAL HERNIA WITH GERD WITHOUT ESOPHAGITIS: ICD-10-CM

## 2025-02-12 DIAGNOSIS — K21.9 HIATAL HERNIA WITH GERD WITHOUT ESOPHAGITIS: ICD-10-CM

## 2025-02-12 RX ORDER — OMEPRAZOLE 40 MG/1
40 CAPSULE, DELAYED RELEASE ORAL DAILY
Qty: 90 CAPSULE | Refills: 1 | Status: SHIPPED | OUTPATIENT
Start: 2025-02-12

## 2025-02-12 RX ORDER — PANTOPRAZOLE SODIUM 40 MG/1
40 TABLET, DELAYED RELEASE ORAL DAILY
Qty: 30 TABLET | Refills: 11 | OUTPATIENT
Start: 2025-02-12

## 2025-02-12 NOTE — TELEPHONE ENCOUNTER
S/w pt as she doesn't need a refill of the protonix, her PCP recently filled. She is wondering why her PPI was changed from Omeprazole to Protonix. She did not have any heartburn while taking the Omeprazole, but now has breakthrough heartburn since taking the protonix.

## 2025-02-13 NOTE — TELEPHONE ENCOUNTER
Called and notified pt, she picked up the omeprazole yesterday and doesn't feel like she needs the F/U appt made yesterday, this has been Cx'd, pt will contact us if she continues to have breakthrough heartburn.

## 2025-02-14 ENCOUNTER — HOSPITAL ENCOUNTER (OUTPATIENT)
Facility: HOSPITAL | Age: 80
Discharge: HOME | End: 2025-02-14
Payer: MEDICARE

## 2025-02-14 DIAGNOSIS — I38 MILD VALVULAR HEART DISEASE: ICD-10-CM

## 2025-02-14 PROCEDURE — 93306 TTE W/DOPPLER COMPLETE: CPT

## 2025-02-16 LAB
ASCENDING AORTA: 3 CM
AV MEAN PRESS GRAD SYS DOP V1V2: 2.44 MMHG
AV VMAX SYS DOP: 107 CM/SEC
BH CV ECHO MEAS - AI P1/2T: 612.4 MSEC
BH CV ECHO MEAS - AO MAX PG: 4.6 MMHG
BH CV ECHO MEAS - AO ROOT DIAM: 2.8 CM
BH CV ECHO MEAS - AO V2 VTI: 20.1 CM
BH CV ECHO MEAS - AVA(I,D): 2.09 CM2
BH CV ECHO MEAS - EDV(CUBED): 90.8 ML
BH CV ECHO MEAS - EDV(MOD-SP2): 40.9 ML
BH CV ECHO MEAS - EDV(MOD-SP4): 46.1 ML
BH CV ECHO MEAS - EF(MOD-SP2): 64.3 %
BH CV ECHO MEAS - EF(MOD-SP4): 59.4 %
BH CV ECHO MEAS - ESV(CUBED): 16.9 ML
BH CV ECHO MEAS - ESV(MOD-SP2): 14.6 ML
BH CV ECHO MEAS - ESV(MOD-SP4): 18.7 ML
BH CV ECHO MEAS - FS: 42.9 %
BH CV ECHO MEAS - IVS/LVPW: 1.07 CM
BH CV ECHO MEAS - IVSD: 0.93 CM
BH CV ECHO MEAS - LA DIMENSION: 2.9 CM
BH CV ECHO MEAS - LAT PEAK E' VEL: 9 CM/SEC
BH CV ECHO MEAS - LV DIASTOLIC VOL/BSA (35-75): 30.1 CM2
BH CV ECHO MEAS - LV MASS(C)D: 132.9 GRAMS
BH CV ECHO MEAS - LV MAX PG: 2.6 MMHG
BH CV ECHO MEAS - LV MEAN PG: 1.36 MMHG
BH CV ECHO MEAS - LV SYSTOLIC VOL/BSA (12-30): 12.2 CM2
BH CV ECHO MEAS - LV V1 MAX: 80.8 CM/SEC
BH CV ECHO MEAS - LV V1 VTI: 14.2 CM
BH CV ECHO MEAS - LVIDD: 4.5 CM
BH CV ECHO MEAS - LVIDS: 2.6 CM
BH CV ECHO MEAS - LVOT AREA: 3 CM2
BH CV ECHO MEAS - LVOT DIAM: 1.94 CM
BH CV ECHO MEAS - LVPWD: 0.87 CM
BH CV ECHO MEAS - MED PEAK E' VEL: 5.8 CM/SEC
BH CV ECHO MEAS - MV A MAX VEL: 80.6 CM/SEC
BH CV ECHO MEAS - MV DEC SLOPE: 232.8 CM/SEC2
BH CV ECHO MEAS - MV DEC TIME: 0.26 SEC
BH CV ECHO MEAS - MV E MAX VEL: 47.6 CM/SEC
BH CV ECHO MEAS - MV E/A: 0.59
BH CV ECHO MEAS - MV MAX PG: 2.9 MMHG
BH CV ECHO MEAS - MV MEAN PG: 0.93 MMHG
BH CV ECHO MEAS - MV P1/2T: 70.7 MSEC
BH CV ECHO MEAS - MV V2 VTI: 17.8 CM
BH CV ECHO MEAS - MVA(P1/2T): 3.1 CM2
BH CV ECHO MEAS - MVA(VTI): 2.35 CM2
BH CV ECHO MEAS - RAP SYSTOLE: 3 MMHG
BH CV ECHO MEAS - RVDD: 2.7 CM
BH CV ECHO MEAS - RVSP: 16 MMHG
BH CV ECHO MEAS - SV(LVOT): 42 ML
BH CV ECHO MEAS - SV(MOD-SP2): 26.3 ML
BH CV ECHO MEAS - SV(MOD-SP4): 27.4 ML
BH CV ECHO MEAS - SVI(LVOT): 27.4 ML/M2
BH CV ECHO MEAS - SVI(MOD-SP2): 17.2 ML/M2
BH CV ECHO MEAS - SVI(MOD-SP4): 17.9 ML/M2
BH CV ECHO MEAS - TR MAX PG: 12.8 MMHG
BH CV ECHO MEAS - TR MAX VEL: 179.2 CM/SEC
BH CV ECHO MEASUREMENTS AVERAGE E/E' RATIO: 6.43
LEFT ATRIUM VOLUME INDEX: 14.4 ML/M2
LV EF BIPLANE MOD: 61.7 %

## 2025-03-24 ENCOUNTER — TELEPHONE (OUTPATIENT)
Dept: FAMILY MEDICINE CLINIC | Age: 80
End: 2025-03-24
Payer: MEDICARE

## 2025-03-24 RX ORDER — AZELASTINE HYDROCHLORIDE 137 UG/1
2 SPRAY, METERED NASAL DAILY
Qty: 30 ML | Refills: 1 | Status: SHIPPED | OUTPATIENT
Start: 2025-03-24

## 2025-03-24 NOTE — TELEPHONE ENCOUNTER
Could I have a prescription for   Azelastine Hydrochloride Nasal spray?  I have had a lot of allergies this season. If you could do 3 months to the mail order?  They are cheaper    This medicine is not on patient med list. Please advise?     Sending to on call Dr Xiong. Dr Love is out of the office

## 2025-03-26 NOTE — TELEPHONE ENCOUNTER
[facility] mail order pharmacy ask how often azelastine is to be used.  Directions say daily.   Faxed back.

## 2025-04-09 NOTE — TELEPHONE ENCOUNTER
Rx Refill Note  Requested Prescriptions     Pending Prescriptions Disp Refills    Azelastine HCl 137 MCG/SPRAY solution 30 mL 1     Sig: Administer 2 sprays into the nostril(s) as directed by provider Daily.      Last office visit with prescribing clinician: 1/24/2025   Last telemedicine visit with prescribing clinician: Visit date not found   Next office visit with prescribing clinician: 7/25/2025     Requesting a 90 days supply    Taylor Adams LPN  04/09/25, 16:26 EDT

## 2025-04-10 RX ORDER — AZELASTINE HYDROCHLORIDE 137 UG/1
2 SPRAY, METERED NASAL DAILY
Qty: 27 ML | Refills: 1 | Status: SHIPPED | OUTPATIENT
Start: 2025-04-10

## 2025-04-11 RX ORDER — CETIRIZINE HYDROCHLORIDE 10 MG/1
10 TABLET ORAL DAILY PRN
Qty: 90 TABLET | Refills: 3 | Status: SHIPPED | OUTPATIENT
Start: 2025-04-11

## 2025-04-11 NOTE — TELEPHONE ENCOUNTER
Rx Refill Note  Requested Prescriptions     Pending Prescriptions Disp Refills    cetirizine (zyrTEC) 10 MG tablet 90 tablet 3     Sig: Take 1 tablet by mouth Daily As Needed for Allergies.      Last office visit with prescribing clinician: 1/24/2025   Last telemedicine visit with prescribing clinician: Visit date not found   Next office visit with prescribing clinician: 7/25/2025  Last refill sent: 06/12/24, #90, 3 refills    Taylor Adams LPN  04/11/25, 13:32 EDT

## 2025-04-28 RX ORDER — CETIRIZINE HYDROCHLORIDE 10 MG/1
10 TABLET ORAL DAILY PRN
Qty: 90 TABLET | Refills: 3 | Status: SHIPPED | OUTPATIENT
Start: 2025-04-28

## 2025-05-14 RX ORDER — OMEPRAZOLE 40 MG/1
40 CAPSULE, DELAYED RELEASE ORAL DAILY
Qty: 90 CAPSULE | Refills: 3 | OUTPATIENT
Start: 2025-05-14

## 2025-05-15 RX ORDER — AZELASTINE HYDROCHLORIDE 137 UG/1
2 SPRAY, METERED NASAL DAILY
Qty: 27 ML | Refills: 1 | Status: SHIPPED | OUTPATIENT
Start: 2025-05-15

## 2025-05-15 NOTE — TELEPHONE ENCOUNTER
Rx Refill Note  Requested Prescriptions     Pending Prescriptions Disp Refills    Azelastine HCl 137 MCG/SPRAY solution 27 mL 1     Sig: Administer 2 sprays into the nostril(s) as directed by provider Daily.      Last office visit with prescribing clinician: 1/24/2025   Last telemedicine visit with prescribing clinician: Visit date not found   Next office visit with prescribing clinician: 7/25/2025      Taylor Adams LPN  05/15/25, 08:48 EDT

## 2025-05-24 DIAGNOSIS — N18.30 CONTROLLED TYPE 2 DIABETES MELLITUS WITH STAGE 3 CHRONIC KIDNEY DISEASE, WITHOUT LONG-TERM CURRENT USE OF INSULIN: ICD-10-CM

## 2025-05-24 DIAGNOSIS — E11.22 CONTROLLED TYPE 2 DIABETES MELLITUS WITH STAGE 3 CHRONIC KIDNEY DISEASE, WITHOUT LONG-TERM CURRENT USE OF INSULIN: ICD-10-CM

## 2025-05-27 RX ORDER — TIRZEPATIDE 2.5 MG/.5ML
INJECTION, SOLUTION SUBCUTANEOUS
Qty: 6 ML | Refills: 1 | Status: SHIPPED | OUTPATIENT
Start: 2025-05-27

## 2025-05-28 RX ORDER — OMEPRAZOLE 40 MG/1
40 CAPSULE, DELAYED RELEASE ORAL DAILY
Qty: 90 CAPSULE | Refills: 3 | OUTPATIENT
Start: 2025-05-28

## 2025-06-02 RX ORDER — OMEPRAZOLE 40 MG/1
40 CAPSULE, DELAYED RELEASE ORAL DAILY
Qty: 90 CAPSULE | Refills: 3 | OUTPATIENT
Start: 2025-06-02

## 2025-06-16 RX ORDER — OMEPRAZOLE 40 MG/1
40 CAPSULE, DELAYED RELEASE ORAL DAILY
Qty: 90 CAPSULE | Refills: 3 | OUTPATIENT
Start: 2025-06-16

## 2025-06-16 NOTE — TELEPHONE ENCOUNTER
Rx Refill Note  Requested Prescriptions     Pending Prescriptions Disp Refills    Azelastine HCl 137 MCG/SPRAY solution [Pharmacy Med Name: AZELASTINE  0.1% 137MCG SOLUTION  NS] 60 mL      Sig: USE 2 SPRAYS IN BOTH NOSTRILS AS DIRECTED BY PROVIDER DAILY      Last office visit with prescribing clinician: 1/24/2025   Last telemedicine visit with prescribing clinician: Visit date not found   Next office visit with prescribing clinician: 7/25/2025  Last refill sent: 05/15/25, 27 ml, 1 refill  Mail order pharmacy requesting 60 ml    Taylor Adams LPN  06/16/25, 09:31 EDT

## 2025-06-17 RX ORDER — AZELASTINE HYDROCHLORIDE 137 UG/1
SPRAY, METERED NASAL
Qty: 60 ML | Refills: 3 | Status: SHIPPED | OUTPATIENT
Start: 2025-06-17

## 2025-06-30 NOTE — PROGRESS NOTES
Primary Care Provider  Mata Love MD   Referring Provider  No ref. provider found    Patient Complaint  Follow-up (6 month), Lung Nodule, and Cough    Patient or patient representative verbalized consent for the use of Ambient Listening during the visit with  JANES Tucker for chart documentation. 7/1/2025  16:04 EDT      Subjective       History of Presenting Illness  Dacia Napier is a pleasant 79 y.o. female   who presents to [facility] with history of lung nodules here for follow-up appointment.  Patient was last seen 8/26/2024.    History of Present Illness  The patient is a 79-year-old female who presents for a 6-month follow-up appointment.    She has been experiencing a persistent cough and recurrent upper respiratory infections since her COVID-19 infection in 2021. She is uncertain if these symptoms are due to allergies or the recurrent upper respiratory infections. She reports no fevers or chest pain. She has been producing a significant amount of mucus, which was initially creamy but has since cleared up with the use of steroids and antibiotics. However, she continues to produce mucus in the mornings.     She has undergone chest x-rays during her upper respiratory infections, but not during her most recent episode. She is not currently on any lung medications and does not believe she needs a rescue inhaler or albuterol at this time. She has been taking Zyrtec every morning since spring, which is a new addition to her regimen. She contracted COVID-19 in 2021 and again in 2023, and suspects another infection in 04/2025 as she had similar symptoms, although she tested negative. She has never been hospitalized for COVID-19.    She mentions that her blood pressure is slightly elevated today, which she attributes to rushing to get to the appointment. Typically, her blood pressure tends to be on the lower side.    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 Patient denies fevers, chills and night sweats. Dacia Napier is able to perform ADLs.      I have personally reviewed the review of systems, past family, social, medical and surgical histories; and agree with their findings.      Review of Systems   Constitutional: Negative.    HENT: Negative.     Respiratory:  Positive for cough.    Cardiovascular: Negative.    Musculoskeletal: Negative.    Neurological: Negative.    Psychiatric/Behavioral: Negative.           Family History   Problem Relation Age of Onset    Heart disease Mother          of massive heart attach at age 66    Heart failure Mother         Congestive heart failure    Miscarriages / Stillbirths Mother         2 miscarriages    Heart attack Mother         Conjested heart failure  on 1st    Heart disease Father         Had 3 heart attacks and  on 3rd at age 79    Heart failure Father         3 heart attacks in 6 years    Hypertension Father     Heart attack Father         3 heart attacks  on 3rd at age 79    Heart disease Sister         Sister with heart problems    Cancer Sister         Breast cancer    Heart failure Sister     Cancer Sister         Melanoma skin cancer    Heart disease Sister         Pace maker    Heart failure Sister         5 by pass    Heart disease Sister     Cancer Sister     Heart attack Sister     Heart disease Brother         Stents  of heart attack    Cancer Brother         Tongue cancer    Heart failure Brother         Pace maker defibrillator and stents at 40    Thyroid disease Brother     Heart attack Brother     Heart failure Brother         By pass    Heart attack Brother     Heart disease Brother         Heart problems    Cancer Brother     Heart failure Brother     Heart attack Brother     Miscarriages / Stillbirths Sister      Cancer Sister         Skin cancer    Cancer Brother         Throat    Heart disease Brother         Had stents put in age 44    Thyroid disease Brother     Cancer Sister         Breast cancer    Heart disease Sister         Pass maker    Heart attack Sister     Heart disease Sister         No heart but high cholesterol    Cancer Sister         Breast cancer    Malig Hyperthermia Neg Hx     Colon cancer Neg Hx         Social History     Socioeconomic History    Marital status:    Tobacco Use    Smoking status: Never     Passive exposure: Past    Smokeless tobacco: Never   Vaping Use    Vaping status: Never Used   Substance and Sexual Activity    Alcohol use: Not Currently     Comment: Hardly ever    Drug use: Never    Sexual activity: Not Currently     Partners: Male     Birth control/protection: Condom, Diaphragm, Spermicide, Pill        Past Medical History:   Diagnosis Date    Anemia 06/2021    Iron prescription    Arrhythmia     Bronchitis     Cataract 2019    Surgery    CHF (congestive heart failure)     Chronic kidney disease     Colon polyp     Coronary artery disease involving native coronary artery of native heart without angina pectoris 08/26/2024    Diabetes mellitus     Diverticulitis of colon     Diverticulosis 2012    Esophageal varices     Essential hypertension 08/26/2024    GERD (gastroesophageal reflux disease)     Hiatal hernia     Hyperlipidemia     Lung nodule 7/2022    On my ct scan at [facility]    Osteopenia 2019    Taking prolia/stopped in 2023    Other allergic rhinitis 01/06/2022    Rapid or irregular heartbeat     Renal insufficiency     Have only one working kidney. The other one is very small an    Seasonal allergies     Type 2 diabetes mellitus     PRE DM    Urinary tract infection 5/12/23    Reoccurring        Immunization History   Administered Date(s) Administered    COVID-19 ([employer]) 03/24/2021    Fluzone  >6mos 10/26/2018    Fluzone (or Fluarix & Flulaval for VFC)  >6mos 10/30/2008, 09/04/2009    Fluzone High-Dose 65+YRS 09/26/2013, 11/10/2015, 10/03/2016, 10/24/2017, 10/16/2018, 10/02/2019, 10/16/2019, 10/04/2020, 11/28/2023, 10/01/2024    Fluzone High-Dose 65+yrs 10/24/2017, 10/16/2019, 10/04/2020, 10/22/2021, 10/04/2022, 11/28/2023    Hep B, Adolescent or Pediatric 05/01/2019, 02/01/2020    Hepatitis A 07/18/2019    Hepatitis B 05/01/2019, 02/01/2020    Hepatitis B Adult/Adolescent IM 05/01/2019, 02/01/2020    Pneumococcal Conjugate 13-Valent (PCV13) 08/20/2019    Pneumococcal Conjugate 20-Valent (PCV20) 08/24/2022    Pneumococcal Conjugate Unspecified 07/18/2019    Pneumococcal Polysaccharide (PPSV23) 01/07/2013, 02/13/2016, 11/04/2017    Shingrix 08/15/2019, 09/14/2019, 02/12/2020, 02/15/2020    TD Preservative Free (Tenivac) 07/18/2019    Tdap 01/21/2010, 10/01/2012    Zostavax 01/15/2009, 02/12/2020       No Known Allergies       Current Outpatient Medications:     Accu-Chek Softclix Lancets lancets, USE 1 LANCET TWICE DAILY, Disp: 200 each, Rfl: 3    aspirin 81 MG EC tablet, Take 1 tablet by mouth Daily. LAST DOSE 08/18/23 PT STOPPED ON OWN, Disp: , Rfl:     Azelastine HCl 137 MCG/SPRAY solution, USE 2 SPRAYS IN BOTH NOSTRILS AS DIRECTED BY PROVIDER DAILY, Disp: 60 mL, Rfl: 3    Calcium Carbonate 1500 (600 Ca) MG tablet, 2 (Two) Times a Day With Meals., Disp: , Rfl:     cetirizine (zyrTEC) 10 MG tablet, Take 1 tablet by mouth Daily As Needed for Allergies., Disp: 90 tablet, Rfl: 3    Cholecalciferol 10 MCG (400 UNIT) capsule, Take 400 Units by mouth Daily. (Patient taking differently: Take 400 Units by mouth Daily. 500 units 2 times a day), Disp: , Rfl:     Collagen-Boron-Hyaluronic Acid (MOVE FREE Resverlogix PO), Take  by mouth., Disp: , Rfl:     estradiol (VAGIFEM) 10 MCG tablet vaginal tablet, Insert 1 tablet into the vagina 2 (Two) Times a Week., Disp: , Rfl:     ezetimibe (ZETIA) 10 MG tablet, TAKE 1 TABLET BY MOUTH DAILY, Disp: 90 tablet, Rfl: 3     "glucose blood (Accu-Chek Guide) test strip, TEST TWICE DAILY, Disp: 200 each, Rfl: 3    guaiFENesin (MUCINEX) 600 MG 12 hr tablet, Take 1 tablet by mouth 2 (Two) Times a Day., Disp: , Rfl:     Omega-3 1000 MG capsule, Take 1 capsule by mouth Daily., Disp: , Rfl:     omeprazole (priLOSEC) 40 MG capsule, Take 1 capsule by mouth Daily., Disp: 90 capsule, Rfl: 3    rosuvastatin (CRESTOR) 10 MG tablet, TAKE 1 TABLET BY MOUTH AT NIGHT, Disp: 90 tablet, Rfl: 3    Tirzepatide (Mounjaro) 2.5 MG/0.5ML solution auto-injector, INJECT THE CONTENTS OF ONE PEN  SUBCUTANEOUSLY WEEKLY AS  DIRECTED INTO THE APPROPRIATE  AREA, Disp: 6 mL, Rfl: 1    Cholecalciferol (Vitamin D3) 50 MCG (2000 UT) tablet, Take 1 tablet by mouth Daily. (Patient not taking: Reported on 7/1/2025), Disp: , Rfl:          Vital Signs   /98 (BP Location: Left arm, Patient Position: Sitting, Cuff Size: Adult)   Pulse 69   Temp 98.2 °F (36.8 °C) (Tympanic)   Resp 16   Ht 157.5 cm (62\")   Wt 54.3 kg (119 lb 9.6 oz)   SpO2 98% Comment: room air  BMI 21.88 kg/m²       Objective     Physical Exam  Vitals reviewed.   Constitutional:       General: She is not in acute distress.     Appearance: Normal appearance. She is not ill-appearing.   HENT:      Head: Normocephalic and atraumatic.      Nose: Nose normal.      Mouth/Throat:      Mouth: Mucous membranes are moist.      Pharynx: Oropharynx is clear.   Eyes:      Extraocular Movements: Extraocular movements intact.      Conjunctiva/sclera: Conjunctivae normal.      Pupils: Pupils are equal, round, and reactive to light.   Cardiovascular:      Rate and Rhythm: Normal rate and regular rhythm.      Pulses: Normal pulses.      Heart sounds: Normal heart sounds.   Pulmonary:      Effort: Pulmonary effort is normal. No respiratory distress.      Breath sounds: Normal breath sounds. No stridor. No wheezing, rhonchi or rales.   Abdominal:      General: Bowel sounds are normal.   Musculoskeletal:         General: " Normal range of motion.      Cervical back: Normal range of motion and neck supple.   Skin:     General: Skin is warm and dry.   Neurological:      Mental Status: She is alert and oriented to person, place, and time.   Psychiatric:         Behavior: Behavior normal.         Physical Exam  Heart: Regular rhythm, no murmurs or abnormal sounds.  Lungs: Clear bilaterally, no adventitious sounds, wheezing, or rhonchi.         Results Review  I have personally reviewed the prior office notes, hospital records, labs, and diagnostics.  CT Chest Low Dose Cancer Screening WO [[id number]] (Order [id number])  Order  Status: Final result     Appointment Information    Display Notes    LVM TO CT - ST 07.10.24                  PACS Images     Radiology Images      Study Result    Narrative & Impression   CT CHEST LOW DOSE CANCER SCREENING WO     Date of Exam: 7/12/2024 9:53 AM EDT     Indication: Lung nodule, 6-8mm.     Comparison: 1/29/2024     Technique: Low dose CT imaging of the chest was performed without intravenous contrast enhancement.  Automated exposure control and iterative reconstruction methods were used.        Findings:  MEDIASTINUM: Small sliding hiatal hernia. Aortic and heart size are normal. No mass nor pericardial effusion.  CORONARY ARTERIES: There is calcified atherosclerotic disease.  LUNGS: Stable pulmonary nodules including:  *6 mm groundglass anterior right lower lobe nodule (image 78, series 204)  *6 mm solid right lower lobe nodule (image 96, series 204)  No new nodules are identified.  PLEURAL SPACE: No effusion, mass, nor pneumothorax.  LYMPH NODES: There are no pathologically enlarged lymph nodes.     UPPER ABDOMEN: Unremarkable     OSSEOUS STRUCTURES: Appropriate for age with no acute process identified.           IMPRESSION:  Impression:  Stable pulmonary nodules.     Recommendation:  Continue annual screening with LDCT     Lung Rads Assessment:  Lung-RADS L2 - Benign appearance or <1% chance of  malignancy.            Electronically Signed: Jason Dunn MD    7/15/2024 8:35 AM EDT    Workstation ID: [id number]       Results  Imaging   - CT scan of the right lower lobe: Nodules present, one measuring 6 mm          Assessment         Patient Active Problem List   Diagnosis    Postmenopausal atrophic vaginitis    Agenesis of right kidney    Type 2 diabetes mellitus treated without insulin    Hiatal hernia with GERD without esophagitis    High cholesterol    Postmenopausal osteoporosis    Recurrent urinary tract infection    Other allergic rhinitis    Heart murmur (neg ECHO in 2024)    Stage 3a chronic kidney disease    Medicare annual wellness visit, subsequent    Urgency incontinence    Recurrent cold sores    Low serum iron    Intrinsic sphincter deficiency    History of colon polyps (last scope was in 2024)    Chronic idiopathic constipation    Essential hypertension    Paroxysmal SVT (supraventricular tachycardia)        Plan     Diagnoses and all orders for this visit:    1. Lung nodules (Primary)  -     CT Chest Without Contrast; Future  -     AFB Culture - , Cough; Future  -     Respiratory Culture - Sputum, Cough; Future  -     Complete PFT - Pre & Post Bronchodilator; Future    2. Productive cough  -     AFB Culture - , Cough; Future  -     Respiratory Culture - Sputum, Cough; Future  -     Complete PFT - Pre & Post Bronchodilator; Future    3. Post covid-19 condition, unspecified         Assessment & Plan  1. Pulmonary nodules.  A CT scan from last year revealed a few small nodules, including a 6 mm nodule in the right lower lobe. A follow-up CT scan will be ordered to monitor these nodules.    2. Recurrent upper respiratory infections.  Recurrent upper respiratory infections have been reported since having COVID-19. Symptoms include coughing and mucus production, particularly in the mornings. A sputum culture will be ordered to identify any bacterial or fungal pathogens. The patient is advised to  collect the sputum sample and drop it off at the lab within 2 hours of collection without refrigeration. A lung function test will also be ordered to assess any changes since the last test in 2022. If the sputum culture grows any pathogens, the appropriate treatment will be determined based on the results.    3. Elevated blood pressure.  Blood pressure was noted to be elevated during the visit, which the patient attributes to rushing to the appointment. Typically, her blood pressure runs low. No further action is required at this time.      Smoking status:  reports that she has never smoked. She has been exposed to tobacco smoke. She has never used smokeless tobacco.    Vaccination status: Reviewed  Immunization History   Administered Date(s) Administered    COVID-19 ([employer]) 03/24/2021    Fluzone  >6mos 10/26/2018    Fluzone (or Fluarix & Flulaval for VFC) >6mos 10/30/2008, 09/04/2009    Fluzone High-Dose 65+YRS 09/26/2013, 11/10/2015, 10/03/2016, 10/24/2017, 10/16/2018, 10/02/2019, 10/16/2019, 10/04/2020, 11/28/2023, 10/01/2024    Fluzone High-Dose 65+yrs 10/24/2017, 10/16/2019, 10/04/2020, 10/22/2021, 10/04/2022, 11/28/2023    Hep B, Adolescent or Pediatric 05/01/2019, 02/01/2020    Hepatitis A 07/18/2019    Hepatitis B 05/01/2019, 02/01/2020    Hepatitis B Adult/Adolescent IM 05/01/2019, 02/01/2020    Pneumococcal Conjugate 13-Valent (PCV13) 08/20/2019    Pneumococcal Conjugate 20-Valent (PCV20) 08/24/2022    Pneumococcal Conjugate Unspecified 07/18/2019    Pneumococcal Polysaccharide (PPSV23) 01/07/2013, 02/13/2016, 11/04/2017    Shingrix 08/15/2019, 09/14/2019, 02/12/2020, 02/15/2020    TD Preservative Free (Tenivac) 07/18/2019    Tdap 01/21/2010, 10/01/2012    Zostavax 01/15/2009, 02/12/2020        Medications personally reviewed    Follow Up  Return in about 6 months (around 1/1/2026) for with Dr. Cai.    Patient was given instructions and counseling regarding her condition or for health maintenance  advice. Please see specific information pulled into the AVS if appropriate.     I spent 15 minutes caring for Dacia Napier on this date of service. This time includes time spent by me in the following activities:preparing for the visit, reviewing tests, obtaining and/or reviewing a separately obtained history, performing a medically appropriate examination and/or evaluation, counseling and educating the patient/family/caregiver, ordering medications, tests, or procedures, documenting information in the medical record, independently interpreting results and communicating that information with the patient/family/caregiver and answered questions family members, discuss medications.

## 2025-07-01 ENCOUNTER — OFFICE VISIT (OUTPATIENT)
Dept: PULMONOLOGY | Facility: CLINIC | Age: 80
End: 2025-07-01
Payer: MEDICARE

## 2025-07-01 VITALS
RESPIRATION RATE: 16 BRPM | HEIGHT: 62 IN | OXYGEN SATURATION: 98 % | TEMPERATURE: 98.2 F | DIASTOLIC BLOOD PRESSURE: 98 MMHG | BODY MASS INDEX: 22.01 KG/M2 | HEART RATE: 69 BPM | SYSTOLIC BLOOD PRESSURE: 142 MMHG | WEIGHT: 119.6 LBS

## 2025-07-01 DIAGNOSIS — R05.8 PRODUCTIVE COUGH: ICD-10-CM

## 2025-07-01 DIAGNOSIS — U09.9 POST COVID-19 CONDITION, UNSPECIFIED: ICD-10-CM

## 2025-07-01 DIAGNOSIS — R91.8 LUNG NODULES: Primary | ICD-10-CM

## 2025-07-01 PROCEDURE — 3077F SYST BP >= 140 MM HG: CPT | Performed by: NURSE PRACTITIONER

## 2025-07-01 PROCEDURE — 99214 OFFICE O/P EST MOD 30 MIN: CPT | Performed by: NURSE PRACTITIONER

## 2025-07-01 PROCEDURE — 1160F RVW MEDS BY RX/DR IN RCRD: CPT | Performed by: NURSE PRACTITIONER

## 2025-07-01 PROCEDURE — 1159F MED LIST DOCD IN RCRD: CPT | Performed by: NURSE PRACTITIONER

## 2025-07-01 PROCEDURE — 3080F DIAST BP >= 90 MM HG: CPT | Performed by: NURSE PRACTITIONER

## 2025-07-01 RX ORDER — GUAIFENESIN 600 MG/1
600 TABLET, EXTENDED RELEASE ORAL 2 TIMES DAILY
COMMUNITY
Start: 2025-04-30

## 2025-07-01 RX ORDER — FLUTICASONE PROPIONATE 50 MCG
1 SPRAY, SUSPENSION (ML) NASAL DAILY
COMMUNITY
Start: 2025-03-20 | End: 2025-07-01

## 2025-07-01 RX ORDER — ESTRADIOL 10 UG/1
1 TABLET, FILM COATED VAGINAL 2 TIMES WEEKLY
COMMUNITY
Start: 2025-03-04

## 2025-07-03 ENCOUNTER — TELEPHONE (OUTPATIENT)
Dept: PULMONOLOGY | Facility: CLINIC | Age: 80
End: 2025-07-03

## 2025-07-03 NOTE — TELEPHONE ENCOUNTER
Caller: Dacia Napier    Relationship: Self    Best call back number: [phone]     What orders are you requesting (i.e. lab or imaging): LOW DOSE SCREENING CT SCAN     Where will you receive your lab/imaging services: [facility] [place]     Additional notes: PATIENT RECEIVED A PHONE CALL THAT SHE WAS OVERDUE FOR LOW DOSE CANCER SCREENING CT SCAN. PLEASE CALL TO ADVISE ONCE ORDER IS IN

## 2025-07-15 ENCOUNTER — TRANSCRIBE ORDERS (OUTPATIENT)
Dept: FAMILY MEDICINE CLINIC | Age: 80
End: 2025-07-15
Payer: MEDICARE

## 2025-07-15 ENCOUNTER — LAB (OUTPATIENT)
Dept: LAB | Facility: HOSPITAL | Age: 80
End: 2025-07-15
Payer: MEDICARE

## 2025-07-15 ENCOUNTER — HOSPITAL ENCOUNTER (OUTPATIENT)
Dept: CT IMAGING | Facility: HOSPITAL | Age: 80
Discharge: HOME | End: 2025-07-15
Payer: MEDICARE

## 2025-07-15 DIAGNOSIS — R91.8 LUNG NODULES: ICD-10-CM

## 2025-07-15 DIAGNOSIS — Z12.31 BREAST CANCER SCREENING BY MAMMOGRAM: Primary | ICD-10-CM

## 2025-07-15 DIAGNOSIS — R05.8 PRODUCTIVE COUGH: ICD-10-CM

## 2025-07-15 DIAGNOSIS — U09.9 POST COVID-19 CONDITION, UNSPECIFIED: ICD-10-CM

## 2025-07-15 PROCEDURE — 71250 CT THORAX DX C-: CPT

## 2025-07-24 ENCOUNTER — TRANSCRIBE ORDERS (OUTPATIENT)
Dept: ADMINISTRATIVE | Facility: HOSPITAL | Age: 80
End: 2025-07-24
Payer: MEDICARE

## 2025-07-24 DIAGNOSIS — N18.30 STAGE 3 CHRONIC KIDNEY DISEASE, UNSPECIFIED WHETHER STAGE 3A OR 3B CKD: Primary | ICD-10-CM

## 2025-07-24 DIAGNOSIS — I10 BENIGN HYPERTENSION: ICD-10-CM

## 2025-07-25 ENCOUNTER — LAB (OUTPATIENT)
Dept: LAB | Facility: HOSPITAL | Age: 80
End: 2025-07-25
Payer: MEDICARE

## 2025-07-25 ENCOUNTER — OFFICE VISIT (OUTPATIENT)
Dept: DIABETES SERVICES | Facility: CLINIC | Age: 80
End: 2025-07-25
Payer: MEDICARE

## 2025-07-25 VITALS
OXYGEN SATURATION: 97 % | HEIGHT: 62 IN | BODY MASS INDEX: 21.35 KG/M2 | SYSTOLIC BLOOD PRESSURE: 109 MMHG | WEIGHT: 116 LBS | DIASTOLIC BLOOD PRESSURE: 74 MMHG | HEART RATE: 60 BPM

## 2025-07-25 DIAGNOSIS — N18.30 CONTROLLED TYPE 2 DIABETES MELLITUS WITH STAGE 3 CHRONIC KIDNEY DISEASE, WITHOUT LONG-TERM CURRENT USE OF INSULIN: Primary | ICD-10-CM

## 2025-07-25 DIAGNOSIS — N18.30 STAGE 3 CHRONIC KIDNEY DISEASE, UNSPECIFIED WHETHER STAGE 3A OR 3B CKD: ICD-10-CM

## 2025-07-25 DIAGNOSIS — I10 BENIGN HYPERTENSION: ICD-10-CM

## 2025-07-25 DIAGNOSIS — E11.22 CONTROLLED TYPE 2 DIABETES MELLITUS WITH STAGE 3 CHRONIC KIDNEY DISEASE, WITHOUT LONG-TERM CURRENT USE OF INSULIN: Primary | ICD-10-CM

## 2025-07-25 LAB
ALBUMIN SERPL-MCNC: 4.3 G/DL (ref 3.5–5.2)
ALBUMIN UR-MCNC: <1.2 MG/DL
ALBUMIN/GLOB SERPL: 2 G/DL
ALP SERPL-CCNC: 100 U/L (ref 39–117)
ALT SERPL W P-5'-P-CCNC: 20 U/L (ref 1–33)
ANION GAP SERPL CALCULATED.3IONS-SCNC: 9.6 MMOL/L (ref 5–15)
AST SERPL-CCNC: 24 U/L (ref 1–32)
BACTERIA UR QL AUTO: ABNORMAL /HPF
BASOPHILS # BLD AUTO: 0.03 10*3/MM3 (ref 0–0.2)
BASOPHILS NFR BLD AUTO: 0.4 % (ref 0–1.5)
BILIRUB SERPL-MCNC: 0.5 MG/DL (ref 0–1.2)
BILIRUB UR QL STRIP: NEGATIVE
BUN SERPL-MCNC: 22 MG/DL (ref 8–23)
BUN/CREAT SERPL: 19.3 (ref 7–25)
CALCIUM SPEC-SCNC: 9.5 MG/DL (ref 8.6–10.5)
CHLORIDE SERPL-SCNC: 101 MMOL/L (ref 98–107)
CLARITY UR: ABNORMAL
CO2 SERPL-SCNC: 25.4 MMOL/L (ref 22–29)
COLOR UR: ABNORMAL
CREAT SERPL-MCNC: 1.14 MG/DL (ref 0.57–1)
CREAT UR-MCNC: 34.6 MG/DL
CREAT UR-MCNC: 41.9 MG/DL
DEPRECATED RDW RBC AUTO: 44 FL (ref 37–54)
EGFRCR SERPLBLD CKD-EPI 2021: 49.1 ML/MIN/1.73
EOSINOPHIL # BLD AUTO: 0.18 10*3/MM3 (ref 0–0.4)
EOSINOPHIL NFR BLD AUTO: 2.6 % (ref 0.3–6.2)
ERYTHROCYTE [DISTWIDTH] IN BLOOD BY AUTOMATED COUNT: 12.8 % (ref 12.3–15.4)
EXPIRATION DATE: ABNORMAL
GLOBULIN UR ELPH-MCNC: 2.2 GM/DL
GLUCOSE SERPL-MCNC: 125 MG/DL (ref 65–99)
GLUCOSE UR STRIP-MCNC: NEGATIVE MG/DL
HBA1C MFR BLD: 5.8 % (ref 4.5–5.7)
HCT VFR BLD AUTO: 41.1 % (ref 34–46.6)
HGB BLD-MCNC: 13 G/DL (ref 12–15.9)
HGB UR QL STRIP.AUTO: ABNORMAL
IMM GRANULOCYTES # BLD AUTO: 0.02 10*3/MM3 (ref 0–0.05)
IMM GRANULOCYTES NFR BLD AUTO: 0.3 % (ref 0–0.5)
KETONES UR QL STRIP: NEGATIVE
LEUKOCYTE ESTERASE UR QL STRIP.AUTO: ABNORMAL
LYMPHOCYTES # BLD AUTO: 1.67 10*3/MM3 (ref 0.7–3.1)
LYMPHOCYTES NFR BLD AUTO: 23.7 % (ref 19.6–45.3)
Lab: ABNORMAL
MCH RBC QN AUTO: 29.4 PG (ref 26.6–33)
MCHC RBC AUTO-ENTMCNC: 31.6 G/DL (ref 31.5–35.7)
MCV RBC AUTO: 93 FL (ref 79–97)
MICROALBUMIN/CREAT UR: NORMAL MG/G{CREAT}
MONOCYTES # BLD AUTO: 0.59 10*3/MM3 (ref 0.1–0.9)
MONOCYTES NFR BLD AUTO: 8.4 % (ref 5–12)
NEUTROPHILS NFR BLD AUTO: 4.56 10*3/MM3 (ref 1.7–7)
NEUTROPHILS NFR BLD AUTO: 64.6 % (ref 42.7–76)
NITRITE UR QL STRIP: NEGATIVE
PH UR STRIP.AUTO: 6 [PH] (ref 5–8)
PLATELET # BLD AUTO: 263 10*3/MM3 (ref 140–450)
PMV BLD AUTO: 10.3 FL (ref 6–12)
POTASSIUM SERPL-SCNC: 4.4 MMOL/L (ref 3.5–5.2)
PROT ?TM UR-MCNC: 5.6 MG/DL
PROT SERPL-MCNC: 6.5 G/DL (ref 6–8.5)
PROT UR QL STRIP: NEGATIVE
PROT/CREAT UR: 0.13 MG/G{CREAT}
RBC # BLD AUTO: 4.42 10*6/MM3 (ref 3.77–5.28)
RBC # UR STRIP: ABNORMAL /HPF
REF LAB TEST METHOD: ABNORMAL
SODIUM SERPL-SCNC: 136 MMOL/L (ref 136–145)
SP GR UR STRIP: 1.01 (ref 1–1.03)
SQUAMOUS #/AREA URNS HPF: ABNORMAL /HPF
UROBILINOGEN UR QL STRIP: ABNORMAL
WBC # UR STRIP: ABNORMAL /HPF
WBC NRBC COR # BLD AUTO: 7.05 10*3/MM3 (ref 3.4–10.8)

## 2025-07-25 PROCEDURE — 36415 COLL VENOUS BLD VENIPUNCTURE: CPT

## 2025-07-25 PROCEDURE — 80053 COMPREHEN METABOLIC PANEL: CPT

## 2025-07-25 PROCEDURE — 81001 URINALYSIS AUTO W/SCOPE: CPT

## 2025-07-25 PROCEDURE — 82570 ASSAY OF URINE CREATININE: CPT

## 2025-07-25 PROCEDURE — 85025 COMPLETE CBC W/AUTO DIFF WBC: CPT

## 2025-07-25 PROCEDURE — 82043 UR ALBUMIN QUANTITATIVE: CPT

## 2025-07-25 PROCEDURE — 84156 ASSAY OF PROTEIN URINE: CPT

## 2025-07-25 NOTE — PROGRESS NOTES
Chief Complaint  Diabetes (Med management, A1C Eval, feels that blood sugar has been higher slightly- has not changed diet that would cause this)    Referred By: No ref. provider found    Subjective          Patient or patient representative verbalized consent for the use of Ambient Listening during the visit with  JANES Betancourt for chart documentation. 7/25/2025  13:51 EDT    Dacia Napier presents to [facility] for diabetes medication management    History of Present Illness    Visit type:  follow-up  Diabetes type:  Type 2  Current diabetes status/concerns/issues:     History of Present Illness  The patient presents for evaluation of diabetes.    She is currently on a regimen of Mounjaro 2.5 mg once weekly for diabetes management. Recently, blood sugar levels have been slightly elevated, ranging from the 120s to 140s. She monitors blood sugar daily upon waking up and associates a buzzing sensation in her big toe with high blood sugar levels. No side effects from the medication have been reported. An upper respiratory infection occurred in 05/2025, but no other new health issues are noted.      Current Diabetes symptoms:    Polyuria: No   Polydipsia: No   Polyphagia: No   Blurred vision: No   Excessive fatigue: No  Known Diabetes complications:  Neuropathy: None; Location: N/A  Renal: Stage IIIa moderate (GFR = 45-59 mL/min) and Microalbuminuria - NEGATIVE  Eyes: No Retinopathy reported on current eye exam; Location: N/A; Date of Last Exam: 1/28/25  Amputation/Wounds: None  GI: None  Cardiovascular: Hypercholesterolemia  ED: N/A  Other: None  Hypoglycemia:  None reported at this time  Hypoglycemia Symptoms:  No hypoglycemia at this time  Current diabetes treatment:  Mounjaro 2.5 mg once weekly    Blood glucose device:  Meter  Blood glucose monitoring frequency:  1  Blood glucose range/average:  120-140  Glucose Source: Patient Reported  Diet:  Limits high carb/sweet  foods, Avoids sugary drinks  Activity/Exercise:  None    Past Medical History:   Diagnosis Date    Anemia 06/2021    Iron prescription    Arrhythmia     Bronchitis     Cataract 2019    Surgery    CHF (congestive heart failure)     Chronic kidney disease     Colon polyp     Coronary artery disease involving native coronary artery of native heart without angina pectoris 08/26/2024    Diabetes mellitus     Diverticulitis of colon     Diverticulosis 2012    Esophageal varices     Essential hypertension 08/26/2024    GERD (gastroesophageal reflux disease)     Hiatal hernia     Hyperlipidemia     Lung nodule 7/2022    On my ct scan at [facility]    Osteopenia 2019    Taking prolia/stopped in 2023    Other allergic rhinitis 01/06/2022    Rapid or irregular heartbeat     Renal insufficiency     Have only one working kidney. The other one is very small an    Seasonal allergies     Type 2 diabetes mellitus     PRE DM    Urinary tract infection 5/12/23    Reoccurring     Past Surgical History:   Procedure Laterality Date    CARDIAC CATHETERIZATION      CATARACT EXTRACTION, BILATERAL      COLONOSCOPY      COLONOSCOPY N/A 06/25/2021    POLYP    COLONOSCOPY N/A 09/19/2024    POLYPS    CYSTOSCOPY W/ BULKING AGENT INJECTION N/A 08/25/2023    Procedure: CYSTOSCOPY WITH BULKING AGENT INJECTION;  Surgeon: Chrissie Katz MD;  Location: [facility] MAIN OR;  Service: Urology;  Laterality: N/A;    ENDOSCOPY N/A 06/25/2021    Procedure: ESOPHAGOGASTRODUODENOSCOPY;  Surgeon: Ty Partida MD;  Location: [facility] ENDOSCOPY;  Service: General;  Laterality: N/A;  Hiatal Hernia    ENDOSCOPY N/A 09/19/2024    Procedure: ESOPHAGOGASTRODUODENOSCOPY WITH BIOPSIES AND BALLON DILATATION 18-20(DILATED TO 20);  Surgeon: Rod Olvera MD;  Location: [facility] ENDOSCOPY;  Service: Gastroenterology;  Laterality: N/A;  HIATAL HERNIA, SCHATSKIS RING    EYE CAPSULOTOMY WITH LASER      [facility] was to bk to  remove foggy eyes    HERNIA REPAIR      HYSTERECTOMY      LASIK      SUBTOTAL HYSTERECTOMY      Did not remove uterus    TONSILLECTOMY       Family History   Problem Relation Age of Onset    Heart disease Mother          of massive heart attach at age 66    Heart failure Mother         Congestive heart failure    Miscarriages / Stillbirths Mother         2 miscarriages    Heart attack Mother         Conjested heart failure  on 1st    Heart disease Father         Had 3 heart attacks and  on 3rd at age 79    Heart failure Father         3 heart attacks in 6 years    Hypertension Father     Heart attack Father         3 heart attacks  on 3rd at age 79    Heart disease Sister         Sister with heart problems    Cancer Sister         Breast cancer    Heart failure Sister     Cancer Sister         Melanoma skin cancer    Heart disease Sister         Pace maker    Heart failure Sister         5 by pass    Heart disease Sister     Cancer Sister     Heart attack Sister     Heart disease Brother         Stents  of heart attack    Cancer Brother         Tongue cancer    Heart failure Brother         Pace maker defibrillator and stents at 40    Thyroid disease Brother     Heart attack Brother     Heart failure Brother         By pass    Heart attack Brother     Heart disease Brother         Heart problems    Cancer Brother     Heart failure Brother     Heart attack Brother     Miscarriages / Stillbirths Sister     Cancer Sister         Skin cancer    Cancer Brother         Throat    Heart disease Brother         Had stents put in age 44    Thyroid disease Brother     Cancer Sister         Breast cancer    Heart disease Sister         Pass maker    Heart attack Sister     Heart disease Sister         No heart but high cholesterol    Cancer Sister         Breast cancer    Malig Hyperthermia Neg Hx     Colon cancer Neg Hx      Social History     Socioeconomic History    Marital status:    Tobacco  Use    Smoking status: Never     Passive exposure: Past    Smokeless tobacco: Never   Vaping Use    Vaping status: Never Used   Substance and Sexual Activity    Alcohol use: Not Currently     Comment: Hardly ever    Drug use: Never    Sexual activity: Not Currently     Partners: Male     Birth control/protection: Condom, Diaphragm, Spermicide, Pill     No Known Allergies    Current Outpatient Medications:     Accu-Chek Softclix Lancets lancets, USE 1 LANCET TWICE DAILY, Disp: 200 each, Rfl: 3    aspirin 81 MG EC tablet, Take 1 tablet by mouth Daily. LAST DOSE 08/18/23 PT STOPPED ON OWN, Disp: , Rfl:     Azelastine HCl 137 MCG/SPRAY solution, USE 2 SPRAYS IN BOTH NOSTRILS AS DIRECTED BY PROVIDER DAILY, Disp: 60 mL, Rfl: 3    Calcium Carbonate 1500 (600 Ca) MG tablet, 2 (Two) Times a Day With Meals., Disp: , Rfl:     cetirizine (zyrTEC) 10 MG tablet, Take 1 tablet by mouth Daily As Needed for Allergies., Disp: 90 tablet, Rfl: 3    Cholecalciferol (Vitamin D3) 50 MCG (2000 UT) tablet, Take 1 tablet by mouth Daily., Disp: , Rfl:     Collagen-Boron-Hyaluronic Acid (MOVE FREE [facility] PO), Take  by mouth., Disp: , Rfl:     estradiol (VAGIFEM) 10 MCG tablet vaginal tablet, Insert 1 tablet into the vagina 2 (Two) Times a Week., Disp: , Rfl:     ezetimibe (ZETIA) 10 MG tablet, TAKE 1 TABLET BY MOUTH DAILY, Disp: 90 tablet, Rfl: 3    glucose blood (Accu-Chek Guide) test strip, TEST TWICE DAILY, Disp: 200 each, Rfl: 3    guaiFENesin (MUCINEX) 600 MG 12 hr tablet, Take 1 tablet by mouth 2 (Two) Times a Day., Disp: , Rfl:     Omega-3 1000 MG capsule, Take 1 capsule by mouth Daily., Disp: , Rfl:     omeprazole (priLOSEC) 40 MG capsule, Take 1 capsule by mouth Daily., Disp: 90 capsule, Rfl: 3    rosuvastatin (CRESTOR) 10 MG tablet, TAKE 1 TABLET BY MOUTH AT NIGHT, Disp: 90 tablet, Rfl: 3    Tirzepatide (Mounjaro) 2.5 MG/0.5ML solution auto-injector, INJECT THE CONTENTS OF ONE PEN  SUBCUTANEOUSLY WEEKLY AS  DIRECTED INTO  "THE APPROPRIATE  AREA, Disp: 6 mL, Rfl: 1    Objective     Vitals:    07/25/25 1308   BP: 109/74   BP Location: Right arm   Patient Position: Sitting   Cuff Size: Adult   Pulse: 60   SpO2: 97%   Weight: 52.6 kg (116 lb)   Height: 157.5 cm (62\")     Body mass index is 21.22 kg/m².    Physical Exam  Constitutional:       Appearance: Normal appearance. She is normal weight.   HENT:      Head: Normocephalic and atraumatic.      Right Ear: External ear normal.      Left Ear: External ear normal.      Nose: Nose normal.   Eyes:      Extraocular Movements: Extraocular movements intact.      Conjunctiva/sclera: Conjunctivae normal.   Pulmonary:      Effort: Pulmonary effort is normal.   Musculoskeletal:         General: Normal range of motion.      Cervical back: Normal range of motion.   Skin:     General: Skin is warm and dry.   Neurological:      General: No focal deficit present.      Mental Status: She is alert and oriented to person, place, and time. Mental status is at baseline.   Psychiatric:         Mood and Affect: Mood normal.         Behavior: Behavior normal.         Thought Content: Thought content normal.         Judgment: Judgment normal.             Result Review :   The following data was reviewed by: JANES Betancourt on 07/25/2025:    Most Recent A1C          7/25/2025    13:17   HGBA1C Most Recent   Hemoglobin A1C 5.8        A1C Last 3 Results          10/17/2024    09:42 1/31/2025    09:40 7/25/2025    13:17   HGBA1C Last 3 Results   Hemoglobin A1C 6.20  5.80  5.8      A1c collected in the office today is 5.8%, indicating Controlled Type II diabetes.  This result is unchanged from the prior result of 5.8% collected on 1/31/25     Creatinine   Date Value Ref Range Status   01/31/2025 1.12 (H) 0.57 - 1.00 mg/dL Final   10/17/2024 0.96 0.57 - 1.00 mg/dL Final     eGFR   Date Value Ref Range Status   01/31/2025 50.1 (L) >60.0 mL/min/1.73 Final   10/17/2024 60.7 >60.0 mL/min/1.73 Final     Labs " collected on 1/31/25 show Stage IIIa moderate (GFR = 45-59 mL/min    Microalbumin, Urine   Date Value Ref Range Status   01/31/2025 1.7 mg/dL Final   06/25/2024 <1.2 mg/dL Final     Creatinine, Urine   Date Value Ref Range Status   01/31/2025 204.4 mg/dL Final   01/31/2025 213.9 mg/dL Final     Microalbumin/Creatinine Ratio   Date Value Ref Range Status   01/31/2025 8.3 0.0 - 29.0 mg/g Final   06/25/2024   Final     Comment:     Unable to calculate     Urine microalbuminuria collected on 1/31/25 is negative for microalbuminuria    Total Cholesterol   Date Value Ref Range Status   01/31/2025 124 0 - 200 mg/dL Final   10/17/2024 174 0 - 200 mg/dL Final     Triglycerides   Date Value Ref Range Status   01/31/2025 93 0 - 150 mg/dL Final   10/17/2024 140 0 - 150 mg/dL Final     HDL Cholesterol   Date Value Ref Range Status   01/31/2025 51 40 - 60 mg/dL Final   10/17/2024 48 40 - 60 mg/dL Final     LDL Cholesterol    Date Value Ref Range Status   01/31/2025 55 0 - 100 mg/dL Final   10/17/2024 101 (H) 0 - 100 mg/dL Final     Lipid panel collected on 1/31/25 shows normal lipid panel              Diagnoses and all orders for this visit:    1. Controlled type 2 diabetes mellitus with stage 3 chronic kidney disease, without long-term current use of insulin (Primary)  -     POC Glycosylated Hemoglobin (Hb A1C)        Assessment & Plan  1. Diabetes mellitus: Not at treatment goal.  - Blood glucose levels have been slightly elevated recently, with readings more in the 120s than the 110s.   - A1c remains stable at 5.8. All other lab results from 01/2025 are within normal limits.  - Discussion included monitoring blood glucose levels closely and reporting any significant increases.   - Currently on Mounjaro 2.5 mg once weekly. If blood glucose levels consistently rise to the 140s - 160s, the dosage of Mounjaro will be increased.        The patient will monitor her blood glucose levels 1 time daily.  If she develops problematic  hyperglycemia or hypoglycemia or adverse drug reactions, she will contact the office for further instructions.        Follow Up     Return in about 3 months (around 10/25/2025) for Medication Management.    Patient was given instructions and counseling regarding her condition or for health maintenance advice. Please see specific information pulled into the AVS if appropriate.     Annabelle Buckley, JANES  07/25/2025        Dictated Utilizing Dragon Dictation.  Please note that portions of this note were completed with a voice recognition program.  Part of this note may be an electronic transcription/translation of spoken language to printed text using the Dragon Dictation System.

## 2025-07-30 ENCOUNTER — LAB (OUTPATIENT)
Dept: LAB | Facility: HOSPITAL | Age: 80
End: 2025-07-30
Payer: MEDICARE

## 2025-07-30 ENCOUNTER — OFFICE VISIT (OUTPATIENT)
Dept: FAMILY MEDICINE CLINIC | Age: 80
End: 2025-07-30
Payer: MEDICARE

## 2025-07-30 VITALS
HEIGHT: 62 IN | DIASTOLIC BLOOD PRESSURE: 70 MMHG | OXYGEN SATURATION: 97 % | BODY MASS INDEX: 21.83 KG/M2 | SYSTOLIC BLOOD PRESSURE: 105 MMHG | HEART RATE: 70 BPM | WEIGHT: 118.6 LBS | TEMPERATURE: 98.1 F

## 2025-07-30 DIAGNOSIS — E11.9 TYPE 2 DIABETES MELLITUS TREATED WITHOUT INSULIN: ICD-10-CM

## 2025-07-30 DIAGNOSIS — R05.8 PRODUCTIVE COUGH: ICD-10-CM

## 2025-07-30 DIAGNOSIS — K21.9 HIATAL HERNIA WITH GERD WITHOUT ESOPHAGITIS: ICD-10-CM

## 2025-07-30 DIAGNOSIS — E78.00 HIGH CHOLESTEROL: ICD-10-CM

## 2025-07-30 DIAGNOSIS — I10 ESSENTIAL HYPERTENSION: Primary | ICD-10-CM

## 2025-07-30 DIAGNOSIS — R91.8 LUNG NODULES: ICD-10-CM

## 2025-07-30 DIAGNOSIS — K44.9 HIATAL HERNIA WITH GERD WITHOUT ESOPHAGITIS: ICD-10-CM

## 2025-07-30 PROBLEM — Z00.00 MEDICARE ANNUAL WELLNESS VISIT, SUBSEQUENT: Status: RESOLVED | Noted: 2022-07-14 | Resolved: 2025-07-30

## 2025-07-30 PROCEDURE — 87206 SMEAR FLUORESCENT/ACID STAI: CPT

## 2025-07-30 PROCEDURE — 87070 CULTURE OTHR SPECIMN AEROBIC: CPT

## 2025-07-30 PROCEDURE — 87205 SMEAR GRAM STAIN: CPT

## 2025-07-30 PROCEDURE — 87116 MYCOBACTERIA CULTURE: CPT

## 2025-07-30 NOTE — PROGRESS NOTES
Chief Complaint  Hyperlipidemia (6 months)    Subjective          Dacia Napier presents to [facility]  History of Present Illness  --TOLERATING BLOOD PRESSURE MEDICATION WITHOUT APPARENT SIDE EFFECTS  --LAST LIPIDS WERE OK, NO ISSUES WITH MEDS  --GERD IS WELL CONTROLLED WITH THE PPI  --LAST HGA1C WAS 5.8 %, FOLLOWED BY SPECIALTY APRN        No Known Allergies     Health Maintenance Due   Topic Date Due    DIABETIC FOOT EXAM  Never done    RSV Vaccine - Adults (1 - 1-dose 75+ series) Never done    COVID-19 Vaccine (2 - 2024-25 season) 09/01/2024    DXA SCAN  07/17/2025        Current Outpatient Medications on File Prior to Visit   Medication Sig    Accu-Chek Softclix Lancets lancets USE 1 LANCET TWICE DAILY    aspirin 81 MG EC tablet Take 1 tablet by mouth Daily. LAST DOSE 08/18/23 PT STOPPED ON OWN    Azelastine HCl 137 MCG/SPRAY solution USE 2 SPRAYS IN BOTH NOSTRILS AS DIRECTED BY PROVIDER DAILY    Calcium Carbonate 1500 (600 Ca) MG tablet 2 (Two) Times a Day With Meals.    cetirizine (zyrTEC) 10 MG tablet Take 1 tablet by mouth Daily As Needed for Allergies.    Cholecalciferol (Vitamin D3) 50 MCG (2000 UT) tablet Take 1 tablet by mouth Daily.    Collagen-Boron-Hyaluronic Acid (MOVE FREE [facility] PO) Take  by mouth.    estradiol (VAGIFEM) 10 MCG tablet vaginal tablet Insert 1 tablet into the vagina 2 (Two) Times a Week.    ezetimibe (ZETIA) 10 MG tablet TAKE 1 TABLET BY MOUTH DAILY    glucose blood (Accu-Chek Guide) test strip TEST TWICE DAILY    guaiFENesin (MUCINEX) 600 MG 12 hr tablet Take 1 tablet by mouth 2 (Two) Times a Day.    Omega-3 1000 MG capsule Take 1 capsule by mouth Daily.    omeprazole (priLOSEC) 40 MG capsule Take 1 capsule by mouth Daily.    rosuvastatin (CRESTOR) 10 MG tablet TAKE 1 TABLET BY MOUTH AT NIGHT    Tirzepatide (Mounjaro) 2.5 MG/0.5ML solution auto-injector INJECT THE CONTENTS OF ONE PEN  SUBCUTANEOUSLY WEEKLY AS  DIRECTED INTO THE  "APPROPRIATE  AREA     No current facility-administered medications on file prior to visit.       Immunization History   Administered Date(s) Administered    COVID-19 (CHARLINE) 03/24/2021    Fluzone  >6mos 10/26/2018    Fluzone (or Fluarix & Flulaval for VFC) >6mos 10/30/2008, 09/04/2009    Fluzone High-Dose 65+YRS 09/26/2013, 11/10/2015, 10/03/2016, 10/24/2017, 10/16/2018, 10/02/2019, 10/16/2019, 10/04/2020, 11/28/2023, 10/01/2024    Fluzone High-Dose 65+yrs 10/24/2017, 10/16/2019, 10/04/2020, 10/22/2021, 10/04/2022, 11/28/2023    Hep B, Adolescent or Pediatric 05/01/2019, 02/01/2020    Hepatitis A 07/18/2019    Hepatitis B 05/01/2019, 02/01/2020    Hepatitis B Adult/Adolescent IM 05/01/2019, 02/01/2020    Pneumococcal Conjugate 13-Valent (PCV13) 08/20/2019    Pneumococcal Conjugate 20-Valent (PCV20) 08/24/2022    Pneumococcal Conjugate Unspecified 07/18/2019    Pneumococcal Polysaccharide (PPSV23) 01/07/2013, 02/13/2016, 11/04/2017    Shingrix 08/15/2019, 09/14/2019, 02/12/2020, 02/15/2020    TD Preservative Free (Tenivac) 07/18/2019    Tdap 01/21/2010, 10/01/2012    Zostavax 01/15/2009, 02/12/2020       Review of Systems   Constitutional:  Negative for activity change, appetite change, chills, fatigue and fever.   HENT:  Negative for congestion, ear pain, rhinorrhea and sore throat.    Respiratory:  Negative for cough and shortness of breath.    Cardiovascular:  Negative for chest pain, palpitations and leg swelling.   Gastrointestinal:  Negative for abdominal pain, constipation, diarrhea, nausea and vomiting.   Musculoskeletal:  Negative for arthralgias and myalgias.   Neurological:  Negative for headache.        Objective     /70 (BP Location: Left arm, Patient Position: Sitting)   Pulse 70   Temp 98.1 °F (36.7 °C) (Oral)   Ht 157.5 cm (62\")   Wt 53.8 kg (118 lb 9.6 oz)   SpO2 97% Comment: on RA  BMI 21.69 kg/m²       Physical Exam  Vitals and nursing note reviewed.   Constitutional:       General: " She is not in acute distress.     Appearance: Normal appearance.   Cardiovascular:      Rate and Rhythm: Normal rate and regular rhythm.      Heart sounds: Normal heart sounds. No murmur heard.  Pulmonary:      Effort: Pulmonary effort is normal.      Breath sounds: Normal breath sounds.   Abdominal:      Palpations: Abdomen is soft.      Tenderness: There is no abdominal tenderness.   Musculoskeletal:      Cervical back: Neck supple.      Right lower leg: No edema.      Left lower leg: No edema.   Lymphadenopathy:      Cervical: No cervical adenopathy.   Neurological:      General: No focal deficit present.      Mental Status: She is alert.      Cranial Nerves: No cranial nerve deficit.      Coordination: Coordination normal.      Gait: Gait normal.   Psychiatric:         Mood and Affect: Mood normal.         Behavior: Behavior normal.         Result Review :                             Assessment and Plan      Diagnoses and all orders for this visit:    1. Essential hypertension (Primary)  Assessment & Plan:  Hypertension is stable and controlled  Continue current treatment regimen.  Blood pressure will be reassessed in 6 months.    Orders:  -     TSH Rfx On Abnormal To Free T4; Future    2. Hiatal hernia with GERD without esophagitis  Assessment & Plan:  IMPROVED WITH CURRENT TREATMENT, WILL REEVALUATE AT NEXT VISIT       3. High cholesterol  Assessment & Plan:   Lipid abnormalities are stable    Plan:  Continue same medication/s without change.      Discussed medication dosage, use, side effects, and goals of treatment in detail.    Counseled patient on lifestyle modifications to help control hyperlipidemia.     Patient Treatment Goals:   LDL goal is less than 70    Followup in 6 months.    Orders:  -     Lipid Panel; Future    4. Type 2 diabetes mellitus treated without insulin  Assessment & Plan:  Diabetes is stable.   Continue current treatment regimen.  Diabetes will be reassessed in 6 months          BMI is  within normal parameters. No other follow-up for BMI required.           Follow Up     Return in about 6 months (around 1/30/2026).    Patient was given instructions and counseling regarding her condition or for health maintenance advice. Please see specific information pulled into the AVS if appropriate.

## 2025-07-30 NOTE — ASSESSMENT & PLAN NOTE
IMPROVED WITH CURRENT TREATMENT, WILL REEVALUATE AT NEXT VISIT    Patients mother called to reschedule 8/26 appointment as she has to work.  Rescheduled to next available on 8/30.

## 2025-08-01 LAB
BACTERIA SPEC RESP CULT: NORMAL
GRAM STN SPEC: NORMAL

## 2025-08-04 ENCOUNTER — HOSPITAL ENCOUNTER (OUTPATIENT)
Dept: RESPIRATORY THERAPY | Facility: HOSPITAL | Age: 80
Discharge: HOME | End: 2025-08-04
Admitting: NURSE PRACTITIONER
Payer: MEDICARE

## 2025-08-04 DIAGNOSIS — R91.8 LUNG NODULES: ICD-10-CM

## 2025-08-04 DIAGNOSIS — R05.8 PRODUCTIVE COUGH: ICD-10-CM

## 2025-08-04 PROCEDURE — 94729 DIFFUSING CAPACITY: CPT

## 2025-08-04 PROCEDURE — 94060 EVALUATION OF WHEEZING: CPT

## 2025-08-04 PROCEDURE — 94726 PLETHYSMOGRAPHY LUNG VOLUMES: CPT

## 2025-08-04 RX ORDER — ALBUTEROL SULFATE 0.83 MG/ML
2.5 SOLUTION RESPIRATORY (INHALATION) ONCE
Status: COMPLETED | OUTPATIENT
Start: 2025-08-04 | End: 2025-08-04

## 2025-08-04 RX ADMIN — ALBUTEROL SULFATE 2.5 MG: 2.5 SOLUTION RESPIRATORY (INHALATION) at 12:12

## 2025-08-05 PROCEDURE — 94726 PLETHYSMOGRAPHY LUNG VOLUMES: CPT | Performed by: STUDENT IN AN ORGANIZED HEALTH CARE EDUCATION/TRAINING PROGRAM

## 2025-08-05 PROCEDURE — 94060 EVALUATION OF WHEEZING: CPT | Performed by: STUDENT IN AN ORGANIZED HEALTH CARE EDUCATION/TRAINING PROGRAM

## 2025-08-05 PROCEDURE — 94729 DIFFUSING CAPACITY: CPT | Performed by: STUDENT IN AN ORGANIZED HEALTH CARE EDUCATION/TRAINING PROGRAM

## 2025-08-06 LAB
MYCOBACTERIUM SPEC CULT: NORMAL
NIGHT BLUE STAIN TISS: NORMAL
NIGHT BLUE STAIN TISS: NORMAL

## 2025-08-13 ENCOUNTER — LAB (OUTPATIENT)
Dept: LAB | Facility: HOSPITAL | Age: 80
End: 2025-08-13
Payer: MEDICARE

## 2025-08-13 ENCOUNTER — HOSPITAL ENCOUNTER (OUTPATIENT)
Dept: MAMMOGRAPHY | Facility: HOSPITAL | Age: 80
Discharge: HOME | End: 2025-08-13
Payer: MEDICARE

## 2025-08-13 ENCOUNTER — RESULTS FOLLOW-UP (OUTPATIENT)
Dept: FAMILY MEDICINE CLINIC | Age: 80
End: 2025-08-13
Payer: MEDICARE

## 2025-08-13 DIAGNOSIS — Z12.31 BREAST CANCER SCREENING BY MAMMOGRAM: ICD-10-CM

## 2025-08-13 DIAGNOSIS — E61.1 LOW SERUM IRON: ICD-10-CM

## 2025-08-13 DIAGNOSIS — I10 ESSENTIAL HYPERTENSION: ICD-10-CM

## 2025-08-13 DIAGNOSIS — E78.00 HIGH CHOLESTEROL: ICD-10-CM

## 2025-08-13 LAB
MYCOBACTERIUM SPEC CULT: NORMAL
NIGHT BLUE STAIN TISS: NORMAL
NIGHT BLUE STAIN TISS: NORMAL

## 2025-08-13 PROCEDURE — 80061 LIPID PANEL: CPT

## 2025-08-13 PROCEDURE — 77063 BREAST TOMOSYNTHESIS BI: CPT

## 2025-08-13 PROCEDURE — 84466 ASSAY OF TRANSFERRIN: CPT

## 2025-08-13 PROCEDURE — 84443 ASSAY THYROID STIM HORMONE: CPT

## 2025-08-13 PROCEDURE — 36415 COLL VENOUS BLD VENIPUNCTURE: CPT

## 2025-08-13 PROCEDURE — 82728 ASSAY OF FERRITIN: CPT

## 2025-08-13 PROCEDURE — 77067 SCR MAMMO BI INCL CAD: CPT

## 2025-08-13 PROCEDURE — 83540 ASSAY OF IRON: CPT

## 2025-08-14 ENCOUNTER — TELEPHONE (OUTPATIENT)
Dept: FAMILY MEDICINE CLINIC | Age: 80
End: 2025-08-14
Payer: MEDICARE

## 2025-08-14 ENCOUNTER — RESULTS FOLLOW-UP (OUTPATIENT)
Dept: FAMILY MEDICINE CLINIC | Age: 80
End: 2025-08-14
Payer: MEDICARE

## 2025-08-14 LAB
CHOLEST SERPL-MCNC: 131 MG/DL (ref 0–200)
FERRITIN SERPL-MCNC: 114 NG/ML (ref 13–150)
HDLC SERPL-MCNC: 50 MG/DL (ref 40–60)
IRON 24H UR-MRATE: 79 MCG/DL (ref 37–145)
IRON SATN MFR SERPL: 19 % (ref 20–50)
LDLC SERPL CALC-MCNC: 61 MG/DL (ref 0–100)
LDLC/HDLC SERPL: 1.17 {RATIO}
TIBC SERPL-MCNC: 410 MCG/DL (ref 298–536)
TRANSFERRIN SERPL-MCNC: 275 MG/DL (ref 200–360)
TRIGL SERPL-MCNC: 112 MG/DL (ref 0–150)
TSH SERPL DL<=0.05 MIU/L-ACNC: 2.17 UIU/ML (ref 0.27–4.2)
VLDLC SERPL-MCNC: 20 MG/DL (ref 5–40)

## 2025-08-20 LAB
MYCOBACTERIUM SPEC CULT: NORMAL
NIGHT BLUE STAIN TISS: NORMAL
NIGHT BLUE STAIN TISS: NORMAL

## 2025-08-26 ENCOUNTER — OFFICE VISIT (OUTPATIENT)
Dept: CARDIOLOGY | Facility: CLINIC | Age: 80
End: 2025-08-26
Payer: MEDICARE

## 2025-08-26 ENCOUNTER — DOCUMENTATION (OUTPATIENT)
Dept: FAMILY MEDICINE CLINIC | Age: 80
End: 2025-08-26
Payer: MEDICARE

## 2025-08-26 VITALS
SYSTOLIC BLOOD PRESSURE: 119 MMHG | DIASTOLIC BLOOD PRESSURE: 75 MMHG | WEIGHT: 118.8 LBS | BODY MASS INDEX: 21.86 KG/M2 | OXYGEN SATURATION: 96 % | HEIGHT: 62 IN | HEART RATE: 72 BPM

## 2025-08-26 DIAGNOSIS — I47.10 PAROXYSMAL SVT (SUPRAVENTRICULAR TACHYCARDIA): ICD-10-CM

## 2025-08-26 DIAGNOSIS — I25.10 CORONARY ARTERY DISEASE INVOLVING NATIVE CORONARY ARTERY OF NATIVE HEART WITHOUT ANGINA PECTORIS: ICD-10-CM

## 2025-08-26 DIAGNOSIS — I38 MILD VALVULAR HEART DISEASE: ICD-10-CM

## 2025-08-26 DIAGNOSIS — I34.0 MILD MITRAL REGURGITATION: ICD-10-CM

## 2025-08-26 DIAGNOSIS — I10 ESSENTIAL HYPERTENSION: Primary | ICD-10-CM

## 2025-08-26 DIAGNOSIS — I35.1 MILD AORTIC REGURGITATION: ICD-10-CM

## 2025-08-26 DIAGNOSIS — E78.5 HYPERLIPIDEMIA LDL GOAL <70: ICD-10-CM

## 2025-08-26 RX ORDER — FERROUS SULFATE 325(65) MG
325 TABLET ORAL
Qty: 90 TABLET | Refills: 3 | Status: SHIPPED | OUTPATIENT
Start: 2025-08-26

## 2025-08-28 LAB
MYCOBACTERIUM SPEC CULT: ABNORMAL
NIGHT BLUE STAIN TISS: ABNORMAL

## (undated) DEVICE — SNAR POLYP CAPTIFLEX XS/OVL 11X2.4MM 240CM 1P/U

## (undated) DEVICE — Device

## (undated) DEVICE — SNAR E/S POLYP SNAREMASTER OVL/10MM 2.8X2300MM YEL

## (undated) DEVICE — Device: Brand: DEFENDO AIR/WATER/SUCTION AND BIOPSY VALVE

## (undated) DEVICE — EGD OR ERCP KIT: Brand: MEDLINE INDUSTRIES, INC.

## (undated) DEVICE — SOL IRRG H2O PL/BG 1000ML STRL

## (undated) DEVICE — TOWEL,OR,DSP,ST,BLUE,STD,4/PK,20PK/CS: Brand: MEDLINE

## (undated) DEVICE — CYSTO/BLADDER IRRIGATION SET, REGULATING CLAMP

## (undated) DEVICE — CYSTO PACK: Brand: MEDLINE INDUSTRIES, INC.

## (undated) DEVICE — LINER SURG CANSTR SXN S/RIGD 1500CC

## (undated) DEVICE — SKIN PREP TRAY W/CHG: Brand: MEDLINE INDUSTRIES, INC.

## (undated) DEVICE — BLCK/BITE BLOX WO/DENTL/RIM W/STRAP 54F

## (undated) DEVICE — COLON KIT: Brand: MEDLINE INDUSTRIES, INC.

## (undated) DEVICE — ESOPHAGEAL BALLOON DILATATION CATHETER: Brand: CRE FIXED WIRE

## (undated) DEVICE — SOLIDIFIER LIQLOC PLS 1500CC BT

## (undated) DEVICE — THE SINGLE USE ETRAP – POLYP TRAP IS USED FOR SUCTION RETRIEVAL OF ENDOSCOPICALLY REMOVED POLYPS.: Brand: ETRAP

## (undated) DEVICE — SINGLE-USE BIOPSY FORCEPS: Brand: RADIAL JAW 4

## (undated) DEVICE — GLV SURG SENSICARE SLT PF LF 7 STRL

## (undated) DEVICE — CONN JET HYDRA H20 AUXILIARY DISP

## (undated) DEVICE — DEV INFL CRE STERIFLATE 60CC DISP